# Patient Record
Sex: MALE | Race: WHITE | NOT HISPANIC OR LATINO | URBAN - METROPOLITAN AREA
[De-identification: names, ages, dates, MRNs, and addresses within clinical notes are randomized per-mention and may not be internally consistent; named-entity substitution may affect disease eponyms.]

---

## 2018-10-19 ENCOUNTER — EMERGENCY (EMERGENCY)
Facility: HOSPITAL | Age: 73
LOS: 0 days | Discharge: HOME | End: 2018-10-19
Attending: EMERGENCY MEDICINE | Admitting: EMERGENCY MEDICINE

## 2018-10-19 VITALS
SYSTOLIC BLOOD PRESSURE: 145 MMHG | RESPIRATION RATE: 18 BRPM | OXYGEN SATURATION: 97 % | HEART RATE: 74 BPM | DIASTOLIC BLOOD PRESSURE: 77 MMHG | TEMPERATURE: 97 F

## 2018-10-19 DIAGNOSIS — R31.9 HEMATURIA, UNSPECIFIED: ICD-10-CM

## 2018-10-19 DIAGNOSIS — E11.9 TYPE 2 DIABETES MELLITUS WITHOUT COMPLICATIONS: ICD-10-CM

## 2018-10-19 DIAGNOSIS — I10 ESSENTIAL (PRIMARY) HYPERTENSION: ICD-10-CM

## 2018-10-19 DIAGNOSIS — R33.9 RETENTION OF URINE, UNSPECIFIED: ICD-10-CM

## 2018-10-19 LAB
APPEARANCE UR: ABNORMAL
BACTERIA # UR AUTO: ABNORMAL /HPF
BILIRUB UR-MCNC: NEGATIVE — SIGNIFICANT CHANGE UP
COLOR SPEC: SIGNIFICANT CHANGE UP
DIFF PNL FLD: ABNORMAL
GLUCOSE UR QL: NEGATIVE MG/DL — SIGNIFICANT CHANGE UP
KETONES UR-MCNC: NEGATIVE — SIGNIFICANT CHANGE UP
LEUKOCYTE ESTERASE UR-ACNC: ABNORMAL
NITRITE UR-MCNC: NEGATIVE — SIGNIFICANT CHANGE UP
PH UR: 6 — SIGNIFICANT CHANGE UP (ref 5–8)
PROT UR-MCNC: 100 MG/DL
RBC CASTS # UR COMP ASSIST: >50 /HPF
SP GR SPEC: 1.02 — SIGNIFICANT CHANGE UP (ref 1.01–1.03)
UROBILINOGEN FLD QL: 0.2 MG/DL — SIGNIFICANT CHANGE UP (ref 0.2–0.2)
WBC UR QL: ABNORMAL /HPF

## 2018-10-19 RX ORDER — CEPHALEXIN 500 MG
500 CAPSULE ORAL ONCE
Qty: 0 | Refills: 0 | Status: DISCONTINUED | OUTPATIENT
Start: 2018-10-19 | End: 2018-10-19

## 2018-10-19 RX ORDER — CEPHALEXIN 500 MG
1 CAPSULE ORAL
Qty: 14 | Refills: 0
Start: 2018-10-19 | End: 2018-10-25

## 2018-10-19 RX ORDER — CEPHALEXIN 500 MG
1 CAPSULE ORAL
Qty: 14 | Refills: 0 | OUTPATIENT
Start: 2018-10-19 | End: 2018-10-25

## 2018-10-19 NOTE — ED PROVIDER NOTE - CARE PLAN
Principal Discharge DX:	Acute urinary retention  Assessment and plan of treatment:	Follow up with your PMD within 48-72 hours.  Take Keflex 1 tab by mouth every 12 hours. Worsening pain, new fever, chills, nausea, vomiting return to ER

## 2018-10-19 NOTE — ED ADULT NURSE NOTE - OBJECTIVE STATEMENT
pt presents with history of prostate CA with radiation, recent hematuria. pt states he has been unable to urinate since last night. denies fever.

## 2018-10-19 NOTE — ED PROVIDER NOTE - PLAN OF CARE
Follow up with your PMD within 48-72 hours.  Take Keflex 1 tab by mouth every 12 hours. Worsening pain, new fever, chills, nausea, vomiting return to ER

## 2018-10-19 NOTE — ED ADULT TRIAGE NOTE - CHIEF COMPLAINT QUOTE
"I have been having bloody urine for many days, then last night I couldn't pee at all." (+) suprapubic pain

## 2018-10-19 NOTE — ED PROVIDER NOTE - NSFOLLOWUPINSTRUCTIONS_ED_ALL_ED_FT
Follow up with your Urologist within 48-72 hours to make a sooner appointment.  Take Keflex 1 tab by mouth every 12 hours. Worsening pain, new fever, chills, nausea, vomiting return to ER

## 2018-10-19 NOTE — ED PROVIDER NOTE - OBJECTIVE STATEMENT
74yo M hx of prostate ca sp surgery remote, sp ?stricture dilation 1 year ago presenting with intermittent hematuria with small clots c/o inability to pass urine since last night. Pt states happened last week and he dislodged the clot with fishing line. No fevers or no chills. has baseline incontinence. No diarrhea or constipation. on asa, plavix. no a/c

## 2018-10-19 NOTE — ED PROVIDER NOTE - MEDICAL DECISION MAKING DETAILS
72yo M with prior hx of prostate ca and dilation of urethral stricture? presenting with retention. Voided twice in ED. afebrile. No pain. UA demonstrates blood. Post void residual 150cc on bedside ultrasound. Discussed discharge with abx for ? uti (symtoms) vs conservative management. Will give abx. Pt has uro follow up early in Nov. instructed to contact urologist and expedite appointment. return precautions given for inability to void, pain, fever or any other concern. Feels and appears well. No complaints at present. Eager to leave. Stable for discharge. Patient was given strict return and follow up precautions. The patient has been informed of all concerning signs and symptoms to return to Emergency Department, the necessity to follow up with PMD/Clinic/follow up provided within 2-3 days was explained, and the patient reports understanding of above with capacity and insight.

## 2018-10-19 NOTE — ED PROVIDER NOTE - PHYSICAL EXAMINATION
VITAL SIGNS: noted  CONSTITUTIONAL: Well-developed; well-nourished; in no acute distress  HEAD: Normocephalic; atraumatic  EYES: PERRL, EOM intact; conjunctiva and sclera clear  ENT: No nasal discharge; airway clear. MMM  NECK: Supple; non tender. No anterior cervical lymphadenopathy noted  CARD: S1, S2 normal; no murmurs, gallops, or rubs. Regular rate and rhythm  RESP: CTAB/L, no wheezes, rales or rhonchi  ABD: Normal bowel sounds; soft; non-distended; non-tender; no hepatosplenomegaly. No CVA tenderness  EXT: Normal ROM. No calf tenderness or edema. Distal pulses intact  NEURO: Alert, oriented. Grossly unremarkable. No focal deficits  SKIN: Skin exam is warm and dry, no acute rash  MS: No midline spinal tenderness

## 2018-11-06 PROBLEM — C61 MALIGNANT NEOPLASM OF PROSTATE: Chronic | Status: ACTIVE | Noted: 2018-10-19

## 2018-11-06 PROBLEM — I10 ESSENTIAL (PRIMARY) HYPERTENSION: Chronic | Status: ACTIVE | Noted: 2018-10-19

## 2018-11-06 PROBLEM — E11.9 TYPE 2 DIABETES MELLITUS WITHOUT COMPLICATIONS: Chronic | Status: ACTIVE | Noted: 2018-10-19

## 2018-11-08 ENCOUNTER — OUTPATIENT (OUTPATIENT)
Dept: OUTPATIENT SERVICES | Facility: HOSPITAL | Age: 73
LOS: 1 days | Discharge: HOME | End: 2018-11-08

## 2018-11-08 DIAGNOSIS — R07.9 CHEST PAIN, UNSPECIFIED: ICD-10-CM

## 2018-11-21 ENCOUNTER — EMERGENCY (EMERGENCY)
Facility: HOSPITAL | Age: 73
LOS: 0 days | Discharge: HOME | End: 2018-11-21
Attending: EMERGENCY MEDICINE | Admitting: EMERGENCY MEDICINE

## 2018-11-21 VITALS
OXYGEN SATURATION: 93 % | DIASTOLIC BLOOD PRESSURE: 67 MMHG | TEMPERATURE: 96 F | SYSTOLIC BLOOD PRESSURE: 147 MMHG | RESPIRATION RATE: 20 BRPM | HEART RATE: 92 BPM

## 2018-11-21 DIAGNOSIS — Z79.2 LONG TERM (CURRENT) USE OF ANTIBIOTICS: ICD-10-CM

## 2018-11-21 DIAGNOSIS — Z79.899 OTHER LONG TERM (CURRENT) DRUG THERAPY: ICD-10-CM

## 2018-11-21 DIAGNOSIS — I10 ESSENTIAL (PRIMARY) HYPERTENSION: ICD-10-CM

## 2018-11-21 DIAGNOSIS — E11.9 TYPE 2 DIABETES MELLITUS WITHOUT COMPLICATIONS: ICD-10-CM

## 2018-11-21 DIAGNOSIS — N40.0 BENIGN PROSTATIC HYPERPLASIA WITHOUT LOWER URINARY TRACT SYMPTOMS: ICD-10-CM

## 2018-11-21 DIAGNOSIS — R33.9 RETENTION OF URINE, UNSPECIFIED: ICD-10-CM

## 2018-11-21 DIAGNOSIS — Z79.02 LONG TERM (CURRENT) USE OF ANTITHROMBOTICS/ANTIPLATELETS: ICD-10-CM

## 2018-11-21 DIAGNOSIS — Z79.82 LONG TERM (CURRENT) USE OF ASPIRIN: ICD-10-CM

## 2018-11-21 DIAGNOSIS — R30.0 DYSURIA: ICD-10-CM

## 2018-11-21 LAB
ALBUMIN SERPL ELPH-MCNC: 4.7 G/DL — SIGNIFICANT CHANGE UP (ref 3.5–5.2)
ALP SERPL-CCNC: 66 U/L — SIGNIFICANT CHANGE UP (ref 30–115)
ALT FLD-CCNC: 16 U/L — SIGNIFICANT CHANGE UP (ref 0–41)
ANION GAP SERPL CALC-SCNC: 19 MMOL/L — HIGH (ref 7–14)
APPEARANCE UR: ABNORMAL
APTT BLD: 26.5 SEC — LOW (ref 27–39.2)
AST SERPL-CCNC: 20 U/L — SIGNIFICANT CHANGE UP (ref 0–41)
BACTERIA # UR AUTO: ABNORMAL /HPF
BASOPHILS # BLD AUTO: 0.04 K/UL — SIGNIFICANT CHANGE UP (ref 0–0.2)
BASOPHILS NFR BLD AUTO: 0.5 % — SIGNIFICANT CHANGE UP (ref 0–1)
BILIRUB SERPL-MCNC: 0.5 MG/DL — SIGNIFICANT CHANGE UP (ref 0.2–1.2)
BILIRUB UR-MCNC: NEGATIVE — SIGNIFICANT CHANGE UP
BLD GP AB SCN SERPL QL: SIGNIFICANT CHANGE UP
BUN SERPL-MCNC: 15 MG/DL — SIGNIFICANT CHANGE UP (ref 10–20)
CALCIUM SERPL-MCNC: 9.8 MG/DL — SIGNIFICANT CHANGE UP (ref 8.5–10.1)
CHLORIDE SERPL-SCNC: 96 MMOL/L — LOW (ref 98–110)
CO2 SERPL-SCNC: 20 MMOL/L — SIGNIFICANT CHANGE UP (ref 17–32)
COLOR SPEC: YELLOW — SIGNIFICANT CHANGE UP
CREAT SERPL-MCNC: 1.3 MG/DL — SIGNIFICANT CHANGE UP (ref 0.7–1.5)
DIFF PNL FLD: ABNORMAL
EOSINOPHIL # BLD AUTO: 0.09 K/UL — SIGNIFICANT CHANGE UP (ref 0–0.7)
EOSINOPHIL NFR BLD AUTO: 1.1 % — SIGNIFICANT CHANGE UP (ref 0–8)
GLUCOSE SERPL-MCNC: 190 MG/DL — HIGH (ref 70–99)
GLUCOSE UR QL: NEGATIVE MG/DL — SIGNIFICANT CHANGE UP
HCT VFR BLD CALC: 31.5 % — LOW (ref 42–52)
HGB BLD-MCNC: 10.7 G/DL — LOW (ref 14–18)
IMM GRANULOCYTES NFR BLD AUTO: 0.5 % — HIGH (ref 0.1–0.3)
INR BLD: 0.92 RATIO — SIGNIFICANT CHANGE UP (ref 0.65–1.3)
KETONES UR-MCNC: NEGATIVE — SIGNIFICANT CHANGE UP
LEUKOCYTE ESTERASE UR-ACNC: ABNORMAL
LYMPHOCYTES # BLD AUTO: 1.52 K/UL — SIGNIFICANT CHANGE UP (ref 1.2–3.4)
LYMPHOCYTES # BLD AUTO: 17.8 % — LOW (ref 20.5–51.1)
MCHC RBC-ENTMCNC: 32.1 PG — HIGH (ref 27–31)
MCHC RBC-ENTMCNC: 34 G/DL — SIGNIFICANT CHANGE UP (ref 32–37)
MCV RBC AUTO: 94.6 FL — HIGH (ref 80–94)
MONOCYTES # BLD AUTO: 0.65 K/UL — HIGH (ref 0.1–0.6)
MONOCYTES NFR BLD AUTO: 7.6 % — SIGNIFICANT CHANGE UP (ref 1.7–9.3)
NEUTROPHILS # BLD AUTO: 6.18 K/UL — SIGNIFICANT CHANGE UP (ref 1.4–6.5)
NEUTROPHILS NFR BLD AUTO: 72.5 % — SIGNIFICANT CHANGE UP (ref 42.2–75.2)
NITRITE UR-MCNC: NEGATIVE — SIGNIFICANT CHANGE UP
NRBC # BLD: 0 /100 WBCS — SIGNIFICANT CHANGE UP (ref 0–0)
PH UR: 6 — SIGNIFICANT CHANGE UP (ref 5–8)
PLATELET # BLD AUTO: 202 K/UL — SIGNIFICANT CHANGE UP (ref 130–400)
POTASSIUM SERPL-MCNC: 4.1 MMOL/L — SIGNIFICANT CHANGE UP (ref 3.5–5)
POTASSIUM SERPL-SCNC: 4.1 MMOL/L — SIGNIFICANT CHANGE UP (ref 3.5–5)
PROT SERPL-MCNC: 7.8 G/DL — SIGNIFICANT CHANGE UP (ref 6–8)
PROT UR-MCNC: ABNORMAL MG/DL
PROTHROM AB SERPL-ACNC: 10.6 SEC — SIGNIFICANT CHANGE UP (ref 9.95–12.87)
RBC # BLD: 3.33 M/UL — LOW (ref 4.7–6.1)
RBC # FLD: 13.9 % — SIGNIFICANT CHANGE UP (ref 11.5–14.5)
RBC CASTS # UR COMP ASSIST: >50 /HPF
SODIUM SERPL-SCNC: 135 MMOL/L — SIGNIFICANT CHANGE UP (ref 135–146)
SP GR SPEC: 1.01 — SIGNIFICANT CHANGE UP (ref 1.01–1.03)
TYPE + AB SCN PNL BLD: SIGNIFICANT CHANGE UP
UROBILINOGEN FLD QL: 0.2 MG/DL — SIGNIFICANT CHANGE UP (ref 0.2–0.2)
WBC # BLD: 8.52 K/UL — SIGNIFICANT CHANGE UP (ref 4.8–10.8)
WBC # FLD AUTO: 8.52 K/UL — SIGNIFICANT CHANGE UP (ref 4.8–10.8)
WBC UR QL: ABNORMAL /HPF

## 2018-11-21 NOTE — ED PROVIDER NOTE - PHYSICAL EXAMINATION
Afebrile, hemodynamically stable, saturating well  NAD, well appearing  Head NCAT  EOMI grossly, anicteric  MM dry  RRR, nml S1/S2, no m/r/g  Lungs CTAB, no w/r/r  Abd soft, suprapubic TTP  No CVAT  AAO, CN's 3-12 grossly intact  LOPEZ spontaneously, no leg cyanosis or edema  Skin warm, dry, no rashes or hives

## 2018-11-21 NOTE — ED PROVIDER NOTE - OBJECTIVE STATEMENT
Declines , uses himself and daughter. 73yoM with h/o BPH, prostate ca s/p radiation, DM, HTN, anemia, s/p TURP, s/p cystoscopy yesterday with Dr. Angel, presents with dysuria, a few blood clots, and suprapubic fullness. Sent in by PMD for urology to place Talamantes. Pt denies fever, vomiting, other abdominal pain, or any other symptoms.

## 2018-11-21 NOTE — CONSULT NOTE ADULT - ASSESSMENT
74 yo male presents with inability to void x 1 day, Talamantes placed 1000 cc pvr    no uti or laurence noted    plan  -d/c home with Talamantes  -continue alfuzosin  -take only ONE anticholinergic, either Toviaz or Oxybutynin  -f/u with Dr. Angel next week for Talamantes removal    case d/w Dr. Soto and Stanley

## 2018-11-21 NOTE — ED ADULT NURSE NOTE - NSIMPLEMENTINTERV_GEN_ALL_ED
Implemented All Universal Safety Interventions:  [place] to call system. Call bell, personal items and telephone within reach. Instruct patient to call for assistance. Room bathroom lighting operational. Non-slip footwear when patient is off stretcher. Physically safe environment: no spills, clutter or unnecessary equipment. Stretcher in lowest position, wheels locked, appropriate side rails in place.

## 2018-11-21 NOTE — ED ADULT TRIAGE NOTE - CHIEF COMPLAINT QUOTE
Pt sent in for urinary retention Pt sent in for urinary retention, pt needs urology for catheter placement due to multiple prostate issues as per pt urologist

## 2018-11-21 NOTE — ED ADULT NURSE NOTE - OBJECTIVE STATEMENT
Pt is with a history of prostate ca is complaining of pain/hematuria on urination. Pt states he feels full with increased pressure starting today

## 2018-11-21 NOTE — CONSULT NOTE ADULT - SUBJECTIVE AND OBJECTIVE BOX
74 yo male with hx of caP s/p XRT 3 years ago, pt presents to ED unable to void since this morning. Pt has urologist in [place] and also saw Dr. Angel yesterday, he underwent a cystoscopy, pt was noted to have post radiation changes. Pt is currently taking Toviaz and oxybutynin for bladder spasms. Pt's daughter at bedside, pt denies n/v/f/c    PAST MEDICAL & SURGICAL HISTORY:  Anemia  PVD (peripheral vascular disease)  Diabetes mellitus: type 2  Hypertension  Prostate CA: with radiation    Home Medications:  alfuzosin 10 mg oral tablet, extended release: 1 tab(s) orally once a day (2018 19:36)  amLODIPine 10 mg oral tablet:  (2018 19:36)  Aspirin Low Dose 81 mg oral delayed release tablet: 1 tab(s) orally once a day (2018 19:36)  atorvastatin 10 mg oral tablet: 1 tab(s) orally once a day (2018 19:36)  glipiZIDE 5 mg oral tablet: 1 tab(s) orally once a day (2018 19:36)  isosorbide mononitrate 30 mg oral tablet, extended release: 1 tab(s) orally once a day (in the morning) (2018 19:36)  Janumet 50 mg-500 mg oral tablet:  (2018 19:36)  lisinopril-hydroCHLOROthiazide 20 mg-25 mg oral tablet: 1 tab(s) orally once a day (2018 19:36)  Plavix 75 mg oral tablet: 1 tab(s) orally once a day (2018 19:36)  Ranexa 500 mg oral tablet, extended release: 1 tab(s) orally 2 times a day (2018 19:36)  rOPINIRole 0.25 mg oral tablet:  (2018 19:36)  Toviaz:  (2018 19:36)    Allergies    No Known Allergies    Intolerances    SHx - NC    FHx - NC    Vital Signs Last 24 Hrs  T(C): 35.8 (2018 18:33), Max: 35.8 (2018 18:33)  T(F): 96.5 (2018 18:33), Max: 96.5 (2018 18:33)  HR: 92 (2018 18:33) (92 - 92)  BP: 147/67 (2018 18:33) (147/67 - 147/67  RR: 20 (2018 18:33) (20 - 20)  SpO2: 93% (2018 18:33) (93% - 93%)    pt seen and examined at bedside  a+ox3, discomfort, non toxic  abd - soft, nd, nttp, +palpable bladder  gu- genitalia wnl, under sterile technique a 14 fr coude catheter was placed w/o issue, 1000cc yellow urine evacuated, pt noted relief of pain                          10.7   8.52  )-----------( 202      ( 2018 20:00 )             31.5   11-21    135  |  96<L>  |  15  ----------------------------<  190<H>  4.1   |  20  |  1.3    Ca    9.8      2018 20:00    TPro  7.8  /  Alb  4.7  /  TBili  0.5  /  DBili  x   /  AST  20  /  ALT  16  /  AlkPhos  66  11-21    Urinalysis Basic - ( 2018 20:40 )    Color: Yellow / Appearance: Cloudy / S.015 / pH: x  Gluc: x / Ketone: Negative  / Bili: Negative / Urobili: 0.2 mg/dL   Blood: x / Protein: Trace mg/dL / Nitrite: Negative   Leuk Esterase: Small / RBC: x / WBC x   Sq Epi: x / Non Sq Epi: x / Bacteria: x

## 2018-11-21 NOTE — ED ADULT NURSE NOTE - CHIEF COMPLAINT QUOTE
Pt sent in for urinary retention, pt needs urology for catheter placement due to multiple prostate issues as per pt urologist

## 2018-11-21 NOTE — ED PROVIDER NOTE - MEDICAL DECISION MAKING DETAILS
Acute urinary retention. No signs of cord compression, pt with strong prostate hx and cystoscopy yesterday. Urology placed Talamantes without difficulty with 1L residual, with no NIDHI or UTI shown, state OK with discharge and outpt f/u, also to stop one of the anticholinergics he is taking. Following placement, pt now well appearing, hemodynamically stable. Discharged with need for uro f/u and to stop one of the anticholinergics and return precautions.

## 2018-11-21 NOTE — ED ADULT NURSE NOTE - PMH
Anemia    Diabetes mellitus  type 2  Hypertension    Prostate CA  with radiation  PVD (peripheral vascular disease)

## 2018-11-22 LAB
CULTURE RESULTS: NO GROWTH — SIGNIFICANT CHANGE UP
SPECIMEN SOURCE: SIGNIFICANT CHANGE UP

## 2018-11-24 ENCOUNTER — EMERGENCY (EMERGENCY)
Facility: HOSPITAL | Age: 73
LOS: 0 days | Discharge: HOME | End: 2018-11-24
Attending: EMERGENCY MEDICINE | Admitting: EMERGENCY MEDICINE

## 2018-11-24 VITALS
DIASTOLIC BLOOD PRESSURE: 69 MMHG | TEMPERATURE: 98 F | SYSTOLIC BLOOD PRESSURE: 149 MMHG | HEIGHT: 70 IN | WEIGHT: 205.03 LBS | HEART RATE: 90 BPM | RESPIRATION RATE: 18 BRPM | OXYGEN SATURATION: 95 %

## 2018-11-24 VITALS
SYSTOLIC BLOOD PRESSURE: 134 MMHG | HEART RATE: 78 BPM | DIASTOLIC BLOOD PRESSURE: 67 MMHG | OXYGEN SATURATION: 96 % | TEMPERATURE: 98 F | RESPIRATION RATE: 18 BRPM

## 2018-11-24 DIAGNOSIS — I10 ESSENTIAL (PRIMARY) HYPERTENSION: ICD-10-CM

## 2018-11-24 DIAGNOSIS — N40.0 BENIGN PROSTATIC HYPERPLASIA WITHOUT LOWER URINARY TRACT SYMPTOMS: ICD-10-CM

## 2018-11-24 DIAGNOSIS — Z79.899 OTHER LONG TERM (CURRENT) DRUG THERAPY: ICD-10-CM
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
## 2018-11-24 DIAGNOSIS — T83.031A LEAKAGE OF INDWELLING URETHRAL CATHETER, INITIAL ENCOUNTER: ICD-10-CM
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
## 2018-11-24 DIAGNOSIS — Z79.2 LONG TERM (CURRENT) USE OF ANTIBIOTICS: ICD-10-CM
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
## 2018-11-24 DIAGNOSIS — Z79.82 LONG TERM (CURRENT) USE OF ASPIRIN: ICD-10-CM

## 2018-11-24 DIAGNOSIS — E11.9 TYPE 2 DIABETES MELLITUS WITHOUT COMPLICATIONS: ICD-10-CM

## 2018-11-24 DIAGNOSIS — Z79.02 LONG TERM (CURRENT) USE OF ANTITHROMBOTICS/ANTIPLATELETS: ICD-10-CM

## 2018-11-24 PROBLEM — D64.9 ANEMIA, UNSPECIFIED: Chronic | Status: ACTIVE | Noted: 2018-11-21

## 2018-11-24 PROBLEM — I73.9 PERIPHERAL VASCULAR DISEASE, UNSPECIFIED: Chronic | Status: ACTIVE | Noted: 2018-11-21

## 2018-11-24 LAB
APPEARANCE UR: CLEAR — SIGNIFICANT CHANGE UP
BACTERIA # UR AUTO: ABNORMAL /HPF
BILIRUB UR-MCNC: NEGATIVE — SIGNIFICANT CHANGE UP
COLOR SPEC: YELLOW — SIGNIFICANT CHANGE UP
DIFF PNL FLD: ABNORMAL
EPI CELLS # UR: ABNORMAL /HPF
GLUCOSE UR QL: NEGATIVE MG/DL — SIGNIFICANT CHANGE UP
KETONES UR-MCNC: NEGATIVE — SIGNIFICANT CHANGE UP
LEUKOCYTE ESTERASE UR-ACNC: ABNORMAL
NITRITE UR-MCNC: NEGATIVE — SIGNIFICANT CHANGE UP
PH UR: 6 — SIGNIFICANT CHANGE UP (ref 5–8)
PROT UR-MCNC: NEGATIVE MG/DL — SIGNIFICANT CHANGE UP
RBC CASTS # UR COMP ASSIST: ABNORMAL /HPF
SP GR SPEC: <=1.005 — SIGNIFICANT CHANGE UP (ref 1.01–1.03)
UROBILINOGEN FLD QL: 0.2 MG/DL — SIGNIFICANT CHANGE UP (ref 0.2–0.2)
WBC UR QL: ABNORMAL /HPF

## 2018-11-24 RX ORDER — CIPROFLOXACIN LACTATE 400MG/40ML
1 VIAL (ML) INTRAVENOUS
Qty: 5 | Refills: 0
Start: 2018-11-24 | End: 2018-11-28

## 2018-11-24 NOTE — ED ADULT NURSE NOTE - NSIMPLEMENTINTERV_GEN_ALL_ED
Implemented All Universal Safety Interventions:  [place] to call system. Call bell, personal items and telephone within reach. Instruct patient to call for assistance. Room bathroom lighting operational. Non-slip footwear when patient is off stretcher. Physically safe environment: no spills, clutter or unnecessary equipment. Stretcher in lowest position, wheels locked, appropriate side rails in place.

## 2018-11-24 NOTE — ED PROVIDER NOTE - MEDICAL DECISION MAKING DETAILS
pt feeling better, palomo working properly, aware of proper palomo care, strict return precautions given, will follow up.

## 2018-11-24 NOTE — ED PROVIDER NOTE - OBJECTIVE STATEMENT
73 y.o M w/ PMHx prostate CA s/p TURP, DM, HTN p/w urinating from around his palomo. His palomo bag has not collected urine since last night. No abd pain, no distention, no fever, no issues with bowel, no blood in urine. Pt's urologist is Dr. Te Angel.

## 2018-11-24 NOTE — ED ADULT NURSE REASSESSMENT NOTE - NS ED NURSE REASSESS COMMENT FT1
pt presents with palomo catheter complaints. Pt reports he does not want to speak to this RN as he was already seen by a doctor and his problem was fixed, pt states "save your time". Pt A&Ox3, no distress noted, resting comfortably.

## 2018-11-24 NOTE — ED PROVIDER NOTE - CARE PROVIDER_API CALL
[place]), Urology  [facility] [place] [address]  Phone: [phone]  Fax: [phone]

## 2018-11-24 NOTE — ED PROVIDER NOTE - NS ED ROS FT
Constitutional:  No fever, chills, lethargy, or abnormal weight loss  Eyes:  No eye pain or visual changes  ENMT: No nasal discharge, no toothache, no sore throat. No neck pain or stiffness  Cardiac:  No chest pain or palpitations  Respiratory:  No cough or respiratory distress.   GI:  No nausea, vomiting, diarrhea or abdominal pain.  :  No dysuria, frequency or burning. Palomo not draining urine. Urine coming out around the palomo.  MS:  No back or joint pain.  Neuro:  No headache. No numbness, weakness, or tingling.   Skin:  No skin rash  Except as documented in the HPI,  all other systems are negative

## 2018-11-24 NOTE — ED PROVIDER NOTE - PROGRESS NOTE DETAILS
Attending Note: I personally evaluated the patient. I reviewed the Resident’s note (as assigned above), and agree with the findings and plan except as documented in my note.   72 y/o M PMHx BPH, prostate CA s/p radiation, HTN, diabetes, anemia, s/p TURP, s/p cystoscopy on 11/20/18 with Dr. Angel presents with leakage around Talamantes that started today. Pt reports he was seen here on 11/21/18 for urinary retention with hematuria. At that time pt was seen by urology, had Talamantes placed, and continued his medications of Alfuzosin.   No fever, chills, n/v, CP,  pleuritic CP, SOB, palpitations, diaphoresis, cough, ha/lh/dizziness, numbness/tingling, neck pain/ stiffness, abdominal pain, diarrhea, constipation, melena/BRBPR, trauma, weakness, edema, calf pain/swelling/erythema, sick contacts, recent travel or rash.PE: Vital Signs: I have reviewed the initial vital signs. Constitutional: WDWN in NAD. Sitting on stretcher in NAD. Integumentary: No rash. ENT: MMM NECK: Supple, non-tender, no meningeal signs. Cardiovascular: RRR, radial pulses 2/4 b/l. No JVD. Respiratory: BS present b/l, ctabl, no wheezing or crackles, good resp effort and excursion, good air exchange,  no accessory muscle use, no stridor. Gastrointestinal: BS present throughout all 4 quadrants, soft, ND, NT, no rebound tenderness or guarding, no CVAT. : leakage around Talamantes, no blood.  Musculoskeletal: FROM, no edema, no calf pain/swelling/erythema. Neurologic: AAOx3, motor 5/5 and sensation intact throughout upper and lower ext, CN II-XII intact, No facial droop or slurring of speech. No focal deficits. Plan: Will flush Talamantes, UA, and reassess. Pt recently had blood work done on 11/21/18 with normal BUN and creatinine. On 11/21/18 pt had labs including CBC, coags, CMP, and UA. BUN and creatinine on 11/21/18 was 15/1.3, urine culture showed no growth. Talamantes was flushed, urine bag now filling with urine with no leakage around Talamantes. Bladder scan prior to this showed 475 cc of urine. Will reassess. Attending Note: I personally evaluated the patient. I reviewed the Resident’s note (as assigned above), and agree with the findings and plan except as documented in my note.   72 y/o M PMHx BPH, prostate CA s/p radiation, HTN, diabetes, anemia, s/p TURP, s/p cystoscopy on 11/20/18 with Dr. Angel presents with leakage around Talamantes that started today. Pt reports he was seen here on 11/21/18 for urinary retention with hematuria. At that time pt was seen by urology, had Talamantes placed, and continued his medications of Alfuzosin.   No fever, chills, n/v, CP,  pleuritic CP, SOB, palpitations, diaphoresis, cough, ha/lh/dizziness, numbness/tingling, neck pain/ stiffness,  diarrhea, constipation, melena/BRBPR, trauma, weakness, edema, calf pain/swelling/erythema, sick contacts, recent travel or rash.PE: Vital Signs: I have reviewed the initial vital signs. Constitutional: WDWN male sitting on stretcher in NAD. Integumentary: No rash. ENT: MMM NECK: Supple, non-tender, no meningeal signs. Cardiovascular: RRR, radial pulses 2/4 b/l. No JVD. Respiratory: BS present b/l, ctabl, no wheezing or crackles, good resp effort and excursion, good air exchange,  no accessory muscle use, no stridor. Gastrointestinal: BS present throughout all 4 quadrants, soft, ND, NT, no rebound tenderness or guarding, no CVAT. : leakage around Talamantes, no blood.  Musculoskeletal: FROM, no edema, no calf pain/swelling/erythema. Neurologic: AAOx3, motor 5/5 and sensation intact throughout upper and lower ext, CN II-XII intact, No facial droop or slurring of speech. No focal deficits. Plan: Will flush Talamantes, UA, and reassess. Pt recently had blood work done on 11/21/18 with normal BUN and creatinine. u.a similar to u.a. on oct 19 which was poistive for Enterococcus faecilis, sensitive to Levaquin , pt aware of abx sent to pharmacy, ambualtory in ed, palomo working properly, would like to go home, eager, reports no symptoms. has appt with urology, will follow up, aware of signs and symptoms to return for.

## 2018-11-24 NOTE — ED PROVIDER NOTE - CONDUCTED A DETAILED DISCUSSION WITH PATIENT AND/OR GUARDIAN REGARDING, MDM
need for outpatient follow-up/lab results/return to ED if symptoms worsen, persist or questions arise return to ED if symptoms worsen, persist or questions arise/need for outpatient follow-up/tPa discussion/lab results

## 2018-11-24 NOTE — ED PROVIDER NOTE - PHYSICAL EXAMINATION
CONSTITUTIONAL: well-appearing, in NAD  SKIN: Warm dry, normal skin turgor  HEAD: NCAT  EYES: EOMI, PERRLA, no scleral icterus  ENT: no sinus tenderness to percussion, normal dental exam, normal pharynx with no erythema or exudates  NECK: Supple; non tender. Full ROM.   CARD: RRR, no murmurs.  RESP: clear to ausculation b/l.  No rales, rhonchi, or wheezing.  ABD: soft, + BS, non-tender, non-distended, no rebound or guarding. No CVA tenderness, Empty palomo bag, Urine leaking from around palomo insertion. Chaperoned by student Daniel.  EXT: Full ROM, no bony tenderness, no pedal edema, no calf tenderness  NEURO: normal motor. normal sensory. Normal gait.  PSYCH: Cooperative, appropriate.

## 2018-11-25 LAB
CULTURE RESULTS: NO GROWTH — SIGNIFICANT CHANGE UP
SPECIMEN SOURCE: SIGNIFICANT CHANGE UP

## 2019-04-24 PROBLEM — Z00.00 ENCOUNTER FOR PREVENTIVE HEALTH EXAMINATION: Status: ACTIVE | Noted: 2019-04-24

## 2019-05-24 ENCOUNTER — EMERGENCY (EMERGENCY)
Facility: HOSPITAL | Age: 74
LOS: 0 days | Discharge: HOME | End: 2019-05-24
Attending: EMERGENCY MEDICINE | Admitting: EMERGENCY MEDICINE
Payer: MEDICARE

## 2019-05-24 VITALS
SYSTOLIC BLOOD PRESSURE: 128 MMHG | OXYGEN SATURATION: 98 % | TEMPERATURE: 98 F | RESPIRATION RATE: 18 BRPM | DIASTOLIC BLOOD PRESSURE: 65 MMHG | HEART RATE: 93 BPM

## 2019-05-24 VITALS — WEIGHT: 201.94 LBS

## 2019-05-24 DIAGNOSIS — Z79.899 OTHER LONG TERM (CURRENT) DRUG THERAPY: ICD-10-CM

## 2019-05-24 DIAGNOSIS — N39.0 URINARY TRACT INFECTION, SITE NOT SPECIFIED: ICD-10-CM

## 2019-05-24 DIAGNOSIS — I10 ESSENTIAL (PRIMARY) HYPERTENSION: ICD-10-CM

## 2019-05-24 DIAGNOSIS — R33.9 RETENTION OF URINE, UNSPECIFIED: ICD-10-CM

## 2019-05-24 DIAGNOSIS — E11.9 TYPE 2 DIABETES MELLITUS WITHOUT COMPLICATIONS: ICD-10-CM

## 2019-05-24 DIAGNOSIS — Z79.84 LONG TERM (CURRENT) USE OF ORAL HYPOGLYCEMIC DRUGS: ICD-10-CM

## 2019-05-24 LAB
ALBUMIN SERPL ELPH-MCNC: 4.2 G/DL — SIGNIFICANT CHANGE UP (ref 3.5–5.2)
ALP SERPL-CCNC: 85 U/L — SIGNIFICANT CHANGE UP (ref 30–115)
ALT FLD-CCNC: 11 U/L — SIGNIFICANT CHANGE UP (ref 0–41)
ANION GAP SERPL CALC-SCNC: 14 MMOL/L — SIGNIFICANT CHANGE UP (ref 7–14)
APPEARANCE UR: ABNORMAL
AST SERPL-CCNC: 12 U/L — SIGNIFICANT CHANGE UP (ref 0–41)
BACTERIA # UR AUTO: ABNORMAL /HPF
BASOPHILS # BLD AUTO: 0.05 K/UL — SIGNIFICANT CHANGE UP (ref 0–0.2)
BASOPHILS NFR BLD AUTO: 0.7 % — SIGNIFICANT CHANGE UP (ref 0–1)
BILIRUB SERPL-MCNC: 0.6 MG/DL — SIGNIFICANT CHANGE UP (ref 0.2–1.2)
BILIRUB UR-MCNC: NEGATIVE — SIGNIFICANT CHANGE UP
BUN SERPL-MCNC: 23 MG/DL — HIGH (ref 10–20)
CALCIUM SERPL-MCNC: 10.2 MG/DL — HIGH (ref 8.5–10.1)
CHLORIDE SERPL-SCNC: 106 MMOL/L — SIGNIFICANT CHANGE UP (ref 98–110)
CO2 SERPL-SCNC: 23 MMOL/L — SIGNIFICANT CHANGE UP (ref 17–32)
COLOR SPEC: ABNORMAL
CREAT SERPL-MCNC: 1.4 MG/DL — SIGNIFICANT CHANGE UP (ref 0.7–1.5)
DIFF PNL FLD: ABNORMAL
EOSINOPHIL # BLD AUTO: 0.2 K/UL — SIGNIFICANT CHANGE UP (ref 0–0.7)
EOSINOPHIL NFR BLD AUTO: 2.7 % — SIGNIFICANT CHANGE UP (ref 0–8)
EPI CELLS # UR: ABNORMAL /HPF
GLUCOSE SERPL-MCNC: 83 MG/DL — SIGNIFICANT CHANGE UP (ref 70–99)
GLUCOSE UR QL: NEGATIVE MG/DL — SIGNIFICANT CHANGE UP
HCT VFR BLD CALC: 35 % — LOW (ref 42–52)
HGB BLD-MCNC: 11.9 G/DL — LOW (ref 14–18)
IMM GRANULOCYTES NFR BLD AUTO: 0.4 % — HIGH (ref 0.1–0.3)
KETONES UR-MCNC: NEGATIVE — SIGNIFICANT CHANGE UP
LEUKOCYTE ESTERASE UR-ACNC: ABNORMAL
LYMPHOCYTES # BLD AUTO: 3 K/UL — SIGNIFICANT CHANGE UP (ref 1.2–3.4)
LYMPHOCYTES # BLD AUTO: 40.7 % — SIGNIFICANT CHANGE UP (ref 20.5–51.1)
MCHC RBC-ENTMCNC: 32.3 PG — HIGH (ref 27–31)
MCHC RBC-ENTMCNC: 34 G/DL — SIGNIFICANT CHANGE UP (ref 32–37)
MCV RBC AUTO: 95.1 FL — HIGH (ref 80–94)
MONOCYTES # BLD AUTO: 0.72 K/UL — HIGH (ref 0.1–0.6)
MONOCYTES NFR BLD AUTO: 9.8 % — HIGH (ref 1.7–9.3)
NEUTROPHILS # BLD AUTO: 3.38 K/UL — SIGNIFICANT CHANGE UP (ref 1.4–6.5)
NEUTROPHILS NFR BLD AUTO: 45.7 % — SIGNIFICANT CHANGE UP (ref 42.2–75.2)
NITRITE UR-MCNC: POSITIVE
NRBC # BLD: 0 /100 WBCS — SIGNIFICANT CHANGE UP (ref 0–0)
PH UR: 6.5 — SIGNIFICANT CHANGE UP (ref 5–8)
PLATELET # BLD AUTO: 201 K/UL — SIGNIFICANT CHANGE UP (ref 130–400)
POTASSIUM SERPL-MCNC: 4.4 MMOL/L — SIGNIFICANT CHANGE UP (ref 3.5–5)
POTASSIUM SERPL-SCNC: 4.4 MMOL/L — SIGNIFICANT CHANGE UP (ref 3.5–5)
PROT SERPL-MCNC: 7.4 G/DL — SIGNIFICANT CHANGE UP (ref 6–8)
PROT UR-MCNC: 100 MG/DL
RBC # BLD: 3.68 M/UL — LOW (ref 4.7–6.1)
RBC # FLD: 13.2 % — SIGNIFICANT CHANGE UP (ref 11.5–14.5)
RBC CASTS # UR COMP ASSIST: ABNORMAL /HPF
SODIUM SERPL-SCNC: 143 MMOL/L — SIGNIFICANT CHANGE UP (ref 135–146)
SP GR SPEC: 1.01 — SIGNIFICANT CHANGE UP (ref 1.01–1.03)
UROBILINOGEN FLD QL: 0.2 MG/DL — SIGNIFICANT CHANGE UP (ref 0.2–0.2)
WBC # BLD: 7.38 K/UL — SIGNIFICANT CHANGE UP (ref 4.8–10.8)
WBC # FLD AUTO: 7.38 K/UL — SIGNIFICANT CHANGE UP (ref 4.8–10.8)
WBC UR QL: ABNORMAL /HPF

## 2019-05-24 PROCEDURE — 99284 EMERGENCY DEPT VISIT MOD MDM: CPT

## 2019-05-24 RX ORDER — CEFDINIR 250 MG/5ML
1 POWDER, FOR SUSPENSION ORAL
Qty: 20 | Refills: 0
Start: 2019-05-24 | End: 2019-06-02

## 2019-05-24 NOTE — ED PROVIDER NOTE - CLINICAL SUMMARY MEDICAL DECISION MAKING FREE TEXT BOX
Patient presented with urinary retention, bladder found to be distended on exam, but patient otherwise afebrile, HD stable, no systemic symptoms. Placed palomo in ED with >1L urinary output with relief of symptoms. Checked labs which showed normal WBC count, normal creatinine. UA showed (+) UTI. Ucx sent. Will leave palomo in place and have patient follow up with urology. Given [facility] urology at patient request in case he cannot get appointment with his [place] urologist. Will prescribe abx in the mean time. Patient agrees with plan. HD stable, ambulatory, tolerates PO at time of discharge.

## 2019-05-24 NOTE — ED PROVIDER NOTE - PROGRESS NOTE DETAILS
Patient seen and evaluated by me. Labs, UA, ucx ordered. Talamantes to be inserted since patient with evidence of urinary retention in ED. Will re-eval pending work up. Patient feels better after palomo placed, urine output >1L. UA shows UTI. Ucx sent. Will discharge home with urology follow up. Patient agrees with plan.

## 2019-05-24 NOTE — ED ADULT NURSE NOTE - OBJECTIVE STATEMENT
Pt c/o difficulty urinating, pt endorses having urinary retention and urinary frequency worsening over the past four days. Pt has pmh of prostate CA now in remission. Denies fevers, chills, burning sensation while urinating, chest pain, shortness of breath, back/flank pain, abd pain, nausea, vomiting, diarrhea.

## 2019-05-24 NOTE — ED PROVIDER NOTE - NSFOLLOWUPCLINICS_GEN_ALL_ED_FT
[facility]  Urology  .  [place]   Phone: [phone]  Fax:   Follow Up Time:

## 2019-05-24 NOTE — ED PROVIDER NOTE - PHYSICAL EXAMINATION
Vital Signs: I have reviewed the initial vital signs.  Constitutional: NAD, well-nourished, appears stated age, no acute distress.  HEENT: Airway patent, moist MM, no erythema/swelling/deformity of oral structures. EOMI, PERRLA.  CV: regular rate, regular rhythm, well-perfused extremities, 2+ b/l DP and radial pulses equal.  Lungs: BCTA, no increased WOB.  ABD: (+) Suprapubic fullness with mild tenderness, but no tenderness elsewhere, no guarding or rebound, no pulsatile mass, no hernias.   MSK: Neck supple, nontender, nl ROM, no stepoff. Chest nontender. Back nontender in TLS spine or to b/l bony structures or flanks. Ext nontender, nl rom, no deformity.   INTEG: Skin warm, dry, no rash.  NEURO: A&Ox3, normal strength, nl sensation throughout, normal speech.   PSYCH: Calm, cooperative, normal affect and interaction. Vital Signs: I have reviewed the initial vital signs.  Constitutional: NAD, well-nourished, appears stated age, no acute distress.  HEENT: Airway patent, moist MM, no erythema/swelling/deformity of oral structures. EOMI, PERRLA.  CV: regular rate, regular rhythm, well-perfused extremities, 2+ b/l DP and radial pulses equal.  Lungs: BCTA, no increased WOB.  ABD: (+) Suprapubic fullness with mild tenderness, but no tenderness elsewhere, no guarding or rebound, no pulsatile mass, no hernias. No CVA tenderness  MSK: Neck supple, nontender, nl ROM, no stepoff. Chest nontender. Back nontender in TLS spine or to b/l bony structures or flanks. Ext nontender, nl rom, no deformity.   INTEG: Skin warm, dry, no rash.  NEURO: A&Ox3, normal strength, nl sensation throughout, normal speech.   PSYCH: Calm, cooperative, normal affect and interaction.

## 2019-05-24 NOTE — ED PROVIDER NOTE - NSFOLLOWUPINSTRUCTIONS_ED_ALL_ED_FT
Talamantes Catheter Placement and Care    WHAT YOU NEED TO KNOW:    What is a Talamantes catheter? A Talamantes catheter is a sterile tube that is inserted into your bladder to drain urine. It is also called an indwelling urinary catheter. The tip of the catheter has a small balloon filled with solution that holds the catheter in your bladder.               How is a Talamantes catheter placed?     Your healthcare provider will clean your genital area with a sterile solution. He or she will put lubricant jelly on the catheter to help it go in smoothly. The end of the catheter with the deflated balloon will be inserted into your urethra. The catheter will be moved slowly and gently into your bladder. You may be asked to take slow, deep breaths or to push as if you were trying to urinate as the catheter is inserted.      When your healthcare provider sees urine flowing from the catheter, he or she will fill the balloon at the end of the catheter. The balloon holds the catheter in place so it does not come out. Your healthcare provider will attach the open end of the catheter to a sterile drainage bag.    How do I care for my Talamantes catheter?     Clean your genital area 2 times every day. Clean your catheter and the area around where it was inserted. Use soap and water. Clean your anal opening and catheter area after every bowel movement.       Secure the catheter tube so you do not pull or move the catheter. This helps prevent pain and bladder spasms. Healthcare providers will show you how to use medical tape or a strap to secure the catheter tube to your body.       Keep a closed drainage system. Your Talamantes catheter should always be attached to the drainage bag to form a closed system. Do not disconnect any part of the closed system unless you need to change the bag.    How do I care for my drainage bag?     Ask if a leg bag is right for you. A leg bag can be worn under your clothes. Ask your healthcare provider for more information about a leg bag.       Keep the drainage bag below the level of your waist. This helps stop urine from moving back up the tubing and into your bladder. Do not loop or kink the tubing. This can cause urine to back up and collect in your bladder. Do not let the drainage bag touch or lie on the floor.      Empty the drainage bag when needed. The weight of a full drainage bag can be painful. Empty the drainage bag every 3 to 6 hours or when it is ? full.       Clean and change the drainage bag as directed. Ask your healthcare provider how often you should change the drainage bag and what cleaning solution to use. Wear disposable gloves when you change the bag. Do not allow the end of the catheter or tubing to touch anything. Clean the ends with an alcohol pad before you reconnect them.    What can I do if problems develop?    No urine is draining into the bag:   Check for kinks in the tubing and straighten them out.       Check the tape or strap used to secure the catheter tube to your skin. Make sure it is not blocking the tube.       Make sure you are not sitting or lying on the tubing.      Make sure the urine bag is hanging below the level of your waist.      Urine leaks from or around the catheter, tubing, or drainage bag: Check if the closed drainage system has accidently come open or apart. Clean the catheter and tubing ends with a new alcohol pad and reconnect them.     What are the risks of a Talamantes catheter? You may develop an infection caused by bacteria. This can happen when the drainage system is opened and bacteria get inside the tubing. You can also get an infection if the catheter equipment is not cleaned well or you do not wash your hands. The infection can spread to your bladder or other organs, and may become severe.    How can I help prevent an infection?     Wash your hands often. Wash before and after you touch your catheter, tubing, or drainage bag. Use soap and water. Wear clean disposable gloves when you care for your catheter or disconnect the drainage bag. Wash your hands before you prepare or eat food. Handwashing           Drink liquids as directed. Ask your healthcare provider how much liquid to drink each day and which liquids are best for you. Liquids will help flush your kidneys and bladder to help prevent infection.    When should I seek immediate care?     Your catheter comes out.       You suddenly have material that looks like sand in the tubing or drainage bag.      No urine is draining into the bag and you have checked the system.      You have pain in your hip, back, pelvis, or lower abdomen.      You are confused or cannot think clearly.    When should I contact my healthcare provider?     You have a fever.      You have bladder spasms for more than 1 day after the catheter is placed.      You see blood in the tubing or drainage bag.      You have a rash or itching where the catheter tube is secured to your skin.      Urine leaks from or around the catheter, tubing, or drainage bag.      The closed drainage system has accidently come open or apart.       You see a layer of crystals inside the tubing.      You have questions or concerns about your condition or care.    CARE AGREEMENT:    You have the right to help plan your care. Learn about your health condition and how it may be treated. Discuss treatment options with your healthcare providers to decide what care you want to receive. You always have the right to refuse treatment.        © Copyright [employer] 2019 All illustrations and images included in CareNotes are the copyrighted property of [employer] or [employer]

## 2019-05-24 NOTE — ED ADULT TRIAGE NOTE - CHIEF COMPLAINT QUOTE
urinary frequency states he goes to urinate every 10 minutes and has over active bladder, hx of prostate CA, sent by his doctor for further evaluation

## 2019-05-24 NOTE — ED PROVIDER NOTE - OBJECTIVE STATEMENT
74 year old male, pmhx HTN, DM, Prostate CA in remission with residual intermittent urinary retention, anemia, PVD, presenting with a 3-4 day history of worsening urinary frequency with retention. States he has been having the urge to urinate frequently but when he does, only a little bit comes out. States he has had this chronically since his prostate CA but states it is worse over the past few days. Sees a urologist in [place] for this but has not yet been able to follow up. States he saw his PMD today who sent him to the ED for evaluation. Denies other symptoms or complaints including fevers, headache, vision changes, weakness/numbness, confusion, URI symptoms, neck pain, chest pain, back pain, dyspnea, cough, palpitations, nausea, vomiting, abdominal pain, diarrhea, constipation, blood in stool/dark stools, penile discharge/testicular pain, leg swelling, rash, recent travel or sick contacts.

## 2019-05-29 ENCOUNTER — FORM ENCOUNTER (OUTPATIENT)
Age: 74
End: 2019-05-29

## 2019-05-30 ENCOUNTER — OUTPATIENT (OUTPATIENT)
Dept: OUTPATIENT SERVICES | Facility: HOSPITAL | Age: 74
LOS: 1 days | Discharge: HOME | End: 2019-05-30
Payer: MEDICARE

## 2019-05-30 ENCOUNTER — APPOINTMENT (OUTPATIENT)
Dept: UROLOGY | Facility: CLINIC | Age: 74
End: 2019-05-30
Payer: MEDICARE

## 2019-05-30 ENCOUNTER — OUTPATIENT (OUTPATIENT)
Dept: OUTPATIENT SERVICES | Facility: HOSPITAL | Age: 74
LOS: 1 days | Discharge: HOME | End: 2019-05-30

## 2019-05-30 VITALS — WEIGHT: 203 LBS | SYSTOLIC BLOOD PRESSURE: 146 MMHG | HEART RATE: 73 BPM | DIASTOLIC BLOOD PRESSURE: 80 MMHG

## 2019-05-30 DIAGNOSIS — R33.8 OTHER RETENTION OF URINE: ICD-10-CM

## 2019-05-30 DIAGNOSIS — Z85.46 PERSONAL HISTORY OF MALIGNANT NEOPLASM OF PROSTATE: ICD-10-CM

## 2019-05-30 DIAGNOSIS — Z78.9 OTHER SPECIFIED HEALTH STATUS: ICD-10-CM

## 2019-05-30 PROCEDURE — 76856 US EXAM PELVIC COMPLETE: CPT | Mod: 26

## 2019-05-30 PROCEDURE — 99205 OFFICE O/P NEW HI 60 MIN: CPT

## 2019-05-30 RX ORDER — LISINOPRIL 30 MG/1
TABLET ORAL
Refills: 0 | Status: ACTIVE | COMMUNITY

## 2019-05-30 RX ORDER — MIRABEGRON 50 MG/1
50 TABLET, FILM COATED, EXTENDED RELEASE ORAL
Refills: 0 | Status: ACTIVE | COMMUNITY

## 2019-05-30 RX ORDER — SITAGLIPTIN AND METFORMIN HYDROCHLORIDE 50; 1000 MG/1; MG/1
TABLET, FILM COATED ORAL
Refills: 0 | Status: ACTIVE | COMMUNITY

## 2019-05-30 RX ORDER — CILOSTAZOL 100 MG/1
TABLET ORAL
Refills: 0 | Status: ACTIVE | COMMUNITY

## 2019-05-30 RX ORDER — TAMSULOSIN HYDROCHLORIDE 0.4 MG/1
CAPSULE ORAL
Refills: 0 | Status: ACTIVE | COMMUNITY

## 2019-05-30 RX ORDER — FINASTERIDE 1 MG/1
TABLET ORAL
Refills: 0 | Status: ACTIVE | COMMUNITY

## 2019-05-30 RX ORDER — GLIPIZIDE 2.5 MG/1
TABLET ORAL
Refills: 0 | Status: ACTIVE | COMMUNITY

## 2019-05-30 NOTE — ASSESSMENT
[FreeTextEntry1] : This is a 74 year male who presents for evaluation.  Was in the ER and had palomo placed for urinary retention. Was placed last firday. was told that he had bacteria in the bladder -- was given cefdinir.  has been told that he has a large prostate one year ago -- urologist in [place] ([place]) treated with radiation.  Was was high PSA.\par Currently on both finasteride, flomax, myrbetriq and oxybutyinin was recently completed. \par had a catheter placed in the past a few months ago as well. 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

## 2019-05-30 NOTE — HISTORY OF PRESENT ILLNESS
[FreeTextEntry1] : This is a 74 year male who presents for evaluation.  Was in the ER and had palomo placed for urinary retention. Was placed last firday. was told that he had bacteria in the bladder -- was given cefdinir.  has been told that he has a large prostate one year ago -- urologist in [place] ([place]) treated with radiation.  Was was high PSA.\par Currently on both finasteride, flomax, myrbetriq and oxybutyinin was recently completed. \par had a catheter placed in the past a few months ago as well. was told that he has urethral stricture at the prostate due to radation -- needed to have incision of bladder neck in the past last year.\par

## 2019-05-30 NOTE — PHYSICAL EXAM
[General Appearance - Well Developed] : well developed [General Appearance - Well Nourished] : well nourished [Normal Appearance] : normal appearance [Well Groomed] : well groomed [General Appearance - In No Acute Distress] : no acute distress [Abdomen Soft] : soft [Abdomen Tenderness] : non-tender [Costovertebral Angle Tenderness] : no ~M costovertebral angle tenderness [Skin Color & Pigmentation] : normal skin color and pigmentation [Edema] : no peripheral edema [] : no respiratory distress [Respiration, Rhythm And Depth] : normal respiratory rhythm and effort [Exaggerated Use Of Accessory Muscles For Inspiration] : no accessory muscle use [Oriented To Time, Place, And Person] : oriented to person, place, and time [Affect] : the affect was normal [Mood] : the mood was normal [Not Anxious] : not anxious [Normal Station and Gait] : the gait and station were normal for the patient's age [No Focal Deficits] : no focal deficits [No Palpable Adenopathy] : no palpable adenopathy

## 2019-06-03 DIAGNOSIS — Z02.9 ENCOUNTER FOR ADMINISTRATIVE EXAMINATIONS, UNSPECIFIED: ICD-10-CM

## 2019-06-05 ENCOUNTER — APPOINTMENT (OUTPATIENT)
Dept: UROLOGY | Facility: CLINIC | Age: 74
End: 2019-06-05
Payer: MEDICARE

## 2019-06-05 VITALS — BODY MASS INDEX: 28.42 KG/M2 | HEIGHT: 71 IN | WEIGHT: 203 LBS

## 2019-06-05 DIAGNOSIS — Z87.448 PERSONAL HISTORY OF OTHER DISEASES OF URINARY SYSTEM: ICD-10-CM

## 2019-06-05 PROCEDURE — 99214 OFFICE O/P EST MOD 30 MIN: CPT

## 2019-06-10 ENCOUNTER — APPOINTMENT (OUTPATIENT)
Dept: CARDIOLOGY | Facility: CLINIC | Age: 74
End: 2019-06-10
Payer: MEDICARE

## 2019-06-10 PROCEDURE — 99214 OFFICE O/P EST MOD 30 MIN: CPT

## 2019-06-10 PROCEDURE — 93000 ELECTROCARDIOGRAM COMPLETE: CPT

## 2019-06-17 NOTE — HISTORY OF PRESENT ILLNESS
[FreeTextEntry1] : This is a 74 year male who presents for evaluation. Was in the ER and had palomo placed for urinary retention. was told that he had bacteria in the bladder -- was given cefdinir. has been told that he has a large prostate one year ago -- urologist in [place] ([place]) treated with radiation. \par Currently on both finasteride, flomax, myrbetriq and oxybutyinin was recently completed. \par had a catheter placed in the past a few months ago as well. 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 I asked him to also stop myrbetriq last vist\par  \par US Prostate, 2.8, x4, x4cm.  about 25g\par \par cysto today showed bladder stone\par patient needs to get prostate biopsy results from past urologist\par

## 2019-06-17 NOTE — PHYSICAL EXAM
[General Appearance - Well Developed] : well developed [General Appearance - Well Nourished] : well nourished [Normal Appearance] : normal appearance [Well Groomed] : well groomed [Abdomen Tenderness] : non-tender [Abdomen Soft] : soft [General Appearance - In No Acute Distress] : no acute distress [Skin Color & Pigmentation] : normal skin color and pigmentation [Costovertebral Angle Tenderness] : no ~M costovertebral angle tenderness [Edema] : no peripheral edema [Respiration, Rhythm And Depth] : normal respiratory rhythm and effort [] : no respiratory distress [Exaggerated Use Of Accessory Muscles For Inspiration] : no accessory muscle use [Mood] : the mood was normal [Affect] : the affect was normal [Oriented To Time, Place, And Person] : oriented to person, place, and time [Not Anxious] : not anxious [Normal Station and Gait] : the gait and station were normal for the patient's age [No Palpable Adenopathy] : no palpable adenopathy [No Focal Deficits] : no focal deficits

## 2019-06-18 ENCOUNTER — OUTPATIENT (OUTPATIENT)
Dept: OUTPATIENT SERVICES | Facility: HOSPITAL | Age: 74
LOS: 1 days | Discharge: HOME | End: 2019-06-18
Payer: MEDICARE

## 2019-06-18 ENCOUNTER — APPOINTMENT (OUTPATIENT)
Dept: UROLOGY | Facility: HOSPITAL | Age: 74
End: 2019-06-18
Payer: MEDICARE

## 2019-06-18 ENCOUNTER — RESULT REVIEW (OUTPATIENT)
Age: 74
End: 2019-06-18

## 2019-06-18 VITALS
TEMPERATURE: 96 F | RESPIRATION RATE: 16 BRPM | SYSTOLIC BLOOD PRESSURE: 163 MMHG | HEART RATE: 75 BPM | WEIGHT: 199.96 LBS | OXYGEN SATURATION: 94 % | HEIGHT: 70 IN | DIASTOLIC BLOOD PRESSURE: 77 MMHG

## 2019-06-18 VITALS — SYSTOLIC BLOOD PRESSURE: 179 MMHG | DIASTOLIC BLOOD PRESSURE: 79 MMHG

## 2019-06-18 DIAGNOSIS — Z98.890 OTHER SPECIFIED POSTPROCEDURAL STATES: Chronic | ICD-10-CM

## 2019-06-18 DIAGNOSIS — Z95.5 PRESENCE OF CORONARY ANGIOPLASTY IMPLANT AND GRAFT: Chronic | ICD-10-CM

## 2019-06-18 LAB — GLUCOSE BLDC GLUCOMTR-MCNC: 123 MG/DL — HIGH (ref 70–99)

## 2019-06-18 PROCEDURE — 88305 TISSUE EXAM BY PATHOLOGIST: CPT | Mod: 26

## 2019-06-18 PROCEDURE — 52318 REMOVE BLADDER STONE: CPT

## 2019-06-18 RX ORDER — DOCUSATE SODIUM 100 MG
1 CAPSULE ORAL
Qty: 90 | Refills: 0
Start: 2019-06-18 | End: 2019-07-17

## 2019-06-18 RX ORDER — AMLODIPINE BESYLATE 2.5 MG/1
0 TABLET ORAL
Qty: 0 | Refills: 0 | DISCHARGE

## 2019-06-18 RX ORDER — AZTREONAM 2 G
1 VIAL (EA) INJECTION
Qty: 10 | Refills: 0
Start: 2019-06-18 | End: 2019-06-22

## 2019-06-18 RX ORDER — HYDROMORPHONE HYDROCHLORIDE 2 MG/ML
0.5 INJECTION INTRAMUSCULAR; INTRAVENOUS; SUBCUTANEOUS
Refills: 0 | Status: DISCONTINUED | OUTPATIENT
Start: 2019-06-18 | End: 2019-06-18

## 2019-06-18 RX ORDER — FESOTERODINE FUMARATE 8 MG/1
0 TABLET, FILM COATED, EXTENDED RELEASE ORAL
Qty: 0 | Refills: 0 | DISCHARGE

## 2019-06-18 RX ORDER — OXYCODONE AND ACETAMINOPHEN 5; 325 MG/1; MG/1
1 TABLET ORAL ONCE
Refills: 0 | Status: DISCONTINUED | OUTPATIENT
Start: 2019-06-18 | End: 2019-06-18

## 2019-06-18 RX ORDER — ROPINIROLE 8 MG/1
0 TABLET, FILM COATED, EXTENDED RELEASE ORAL
Qty: 0 | Refills: 0 | DISCHARGE

## 2019-06-18 RX ORDER — MORPHINE SULFATE 50 MG/1
2 CAPSULE, EXTENDED RELEASE ORAL
Refills: 0 | Status: DISCONTINUED | OUTPATIENT
Start: 2019-06-18 | End: 2019-06-18

## 2019-06-18 RX ORDER — ASPIRIN/CALCIUM CARB/MAGNESIUM 324 MG
1 TABLET ORAL
Qty: 0 | Refills: 0 | DISCHARGE

## 2019-06-18 RX ORDER — ONDANSETRON 8 MG/1
4 TABLET, FILM COATED ORAL ONCE
Refills: 0 | Status: DISCONTINUED | OUTPATIENT
Start: 2019-06-18 | End: 2019-07-03

## 2019-06-18 RX ORDER — PHENAZOPYRIDINE HCL 100 MG
1 TABLET ORAL
Qty: 4 | Refills: 0
Start: 2019-06-18 | End: 2019-06-19

## 2019-06-18 RX ORDER — ISOSORBIDE MONONITRATE 60 MG/1
1 TABLET, EXTENDED RELEASE ORAL
Qty: 0 | Refills: 0 | DISCHARGE

## 2019-06-18 RX ORDER — ALFUZOSIN HYDROCHLORIDE 10 MG/1
1 TABLET, EXTENDED RELEASE ORAL
Qty: 0 | Refills: 0 | DISCHARGE

## 2019-06-18 RX ORDER — SODIUM CHLORIDE 9 MG/ML
1000 INJECTION, SOLUTION INTRAVENOUS
Refills: 0 | Status: DISCONTINUED | OUTPATIENT
Start: 2019-06-18 | End: 2019-07-03

## 2019-06-18 RX ADMIN — SODIUM CHLORIDE 100 MILLILITER(S): 9 INJECTION, SOLUTION INTRAVENOUS at 10:40

## 2019-06-18 NOTE — PRE-ANESTHESIA EVALUATION ADULT - NSANTHOSAYNRD_GEN_A_CORE
No. GALINA screening performed.  STOP BANG Legend: 0-2 = LOW Risk; 3-4 = INTERMEDIATE Risk; 5-8 = HIGH Risk

## 2019-06-18 NOTE — BRIEF OPERATIVE NOTE - NSICDXBRIEFPREOP_GEN_ALL_CORE_FT
PRE-OP DIAGNOSIS:  Stone, bladder 18-Jun-2019 10:59:10  Scar Butterfield A
PRE-OP DIAGNOSIS:  Bladder stone 18-Jun-2019 11:06:03  Scar Butterfield  Stone, bladder 18-Jun-2019 10:59:10  Scar Butterfield

## 2019-06-18 NOTE — BRIEF OPERATIVE NOTE - NSICDXBRIEFPROCEDURE_GEN_ALL_CORE_FT
PROCEDURES:  Cystoscopic removal of bladder stone 18-Jun-2019 10:59:01  Scar Butterfield
PROCEDURES:  Cystoscopic removal of bladder stone 18-Jun-2019 11:05:51  Scar Butterfield  Cystoscopic removal of bladder stone 18-Jun-2019 10:59:01  Scar Butterfield

## 2019-06-18 NOTE — H&P PST ADULT - HISTORY OF PRESENT ILLNESS
HPI: pt is a 73yo M w/ PMH DM, HTN, CAD being admitted for cystolithopaxy for tx of bladder stones. Pt reports urinary catheter x1wk, with concentrated urine in bag. Pt denies abdominal pain, pelvic pain, difficulty urinating, fever, chills, pain w/ urination, SOB, or chest pain.

## 2019-06-18 NOTE — H&P PST ADULT - NSICDXPASTMEDICALHX_GEN_ALL_CORE_FT
PAST MEDICAL HISTORY:  Anemia     CAD, multiple vessel     Diabetes mellitus type 2    Hypertension     Prostate CA with radiation    PVD (peripheral vascular disease)

## 2019-06-18 NOTE — ASU DISCHARGE PLAN (ADULT/PEDIATRIC) - CARE PROVIDER_API CALL
Scar Butterfield)  [facility]  [address] [place] [address], [address]  [place] [address]  Phone: [phone]  Fax: [phone]  Follow Up Time:

## 2019-06-18 NOTE — BRIEF OPERATIVE NOTE - OPERATION/FINDINGS
bladder stone in bladder with some epithelialization --- radiation cystitis through out bladder and radiation changes to prostate

## 2019-06-21 ENCOUNTER — APPOINTMENT (OUTPATIENT)
Dept: UROLOGY | Facility: CLINIC | Age: 74
End: 2019-06-21
Payer: MEDICARE

## 2019-06-21 VITALS
HEIGHT: 71 IN | BODY MASS INDEX: 28.42 KG/M2 | HEART RATE: 82 BPM | DIASTOLIC BLOOD PRESSURE: 89 MMHG | WEIGHT: 203 LBS | SYSTOLIC BLOOD PRESSURE: 152 MMHG

## 2019-06-21 PROBLEM — I25.10 ATHEROSCLEROTIC HEART DISEASE OF NATIVE CORONARY ARTERY WITHOUT ANGINA PECTORIS: Chronic | Status: ACTIVE | Noted: 2019-06-18

## 2019-06-21 LAB — SURGICAL PATHOLOGY STUDY: SIGNIFICANT CHANGE UP

## 2019-06-21 PROCEDURE — 99213 OFFICE O/P EST LOW 20 MIN: CPT | Mod: 24

## 2019-06-22 DIAGNOSIS — C67.5 MALIGNANT NEOPLASM OF BLADDER NECK: ICD-10-CM

## 2019-06-22 DIAGNOSIS — N21.0 CALCULUS IN BLADDER: ICD-10-CM

## 2019-06-22 LAB — COMPN STONE: SIGNIFICANT CHANGE UP

## 2019-06-23 NOTE — ASSESSMENT
[FreeTextEntry1] : This is a 74 year male who presents for evaluation. Was in the ER and had palomo placed for urinary retention. was told that he had bacteria in the bladder -- was given cefdinir. has been told that he has a large prostate one year ago -- urologist in [place] ([place]) treated with radiation. \par Currently on both finasteride, flomax, myrbetriq and oxybutyinin was recently completed. \par had a catheter placed in the past a few months ago as well. 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 I asked him to also stop myrbetriq last vist\par  \par US Prostate, 2.8, x4, x4cm.  about 25g\par \par cysto showed bladder stone and he is s/p Cystolitholapaxy on 6/18/19. He presents today for Palomo catheter removal.

## 2019-06-23 NOTE — HISTORY OF PRESENT ILLNESS
[FreeTextEntry1] : This is a 74 year male who presents for evaluation. Was in the ER and had palomo placed for urinary retention. was told that he had bacteria in the bladder -- was given cefdinir. has been told that he has a large prostate one year ago -- urologist in [place] ([place]) treated with radiation. \par Currently on both finasteride, flomax, myrbetriq and oxybutyinin was recently completed. \par had a catheter placed in the past a few months ago as well. 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 I asked him to also stop myrbetriq last vist\par  \par US Prostate, 2.8, x4, x4cm.  about 25g\par \par cysto showed bladder stone and he is s/p Cystolitholapaxy on 6/18/19. He presents today for Palomo catheter removal.\par

## 2019-06-23 NOTE — PHYSICAL EXAM
[General Appearance - Well Developed] : well developed [General Appearance - Well Nourished] : well nourished [Normal Appearance] : normal appearance [General Appearance - In No Acute Distress] : no acute distress [Well Groomed] : well groomed [Abdomen Soft] : soft [Abdomen Tenderness] : non-tender [Costovertebral Angle Tenderness] : no ~M costovertebral angle tenderness [Urethral Meatus] : meatus normal [Urinary Bladder Findings] : the bladder was normal on palpation [Scrotum] : the scrotum was normal [Testes Mass (___cm)] : there were no testicular masses [Edema] : no peripheral edema [] : no respiratory distress [Respiration, Rhythm And Depth] : normal respiratory rhythm and effort [Exaggerated Use Of Accessory Muscles For Inspiration] : no accessory muscle use [Oriented To Time, Place, And Person] : oriented to person, place, and time [Affect] : the affect was normal [Not Anxious] : not anxious [Mood] : the mood was normal [Normal Station and Gait] : the gait and station were normal for the patient's age [No Focal Deficits] : no focal deficits [Skin Color & Pigmentation] : normal skin color and pigmentation [FreeTextEntry1] : Talamantes catheter in place and draining clear urine.

## 2019-06-28 ENCOUNTER — APPOINTMENT (OUTPATIENT)
Dept: UROLOGY | Facility: CLINIC | Age: 74
End: 2019-06-28
Payer: MEDICARE

## 2019-06-28 PROCEDURE — 99213 OFFICE O/P EST LOW 20 MIN: CPT

## 2019-06-28 NOTE — ASSESSMENT
[FreeTextEntry1] : This is a 74 year male who presents for evaluation. Was in the ER and had palomo placed for urinary retention. was told that he had bacteria in the bladder -- was given cefdinir. has been told that he has a large prostate one year ago -- urologist in [place] ([place]) treated with radiation (2 years ago). \par Currently on both finasteride, flomax, myrbetriq and oxybutyinin was recently completed. \par had a catheter placed in the past a few months ago as well. 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 I asked him to also stop myrbetriq last vist\par  \par US Prostate, 2.8, x4, x4cm. about 25g\par \par cysto showed bladder stone and he is s/p Cystolitholapaxy on 6/18/19.  has been voiding much better since the procedure\par but has had bleeding for the last year and wishes for us to resolve this as well\par \par Patient still to bring me notes from [place] urologist detailing status of prostate cancer\par at the next visit

## 2019-06-28 NOTE — PHYSICAL EXAM
[General Appearance - Well Developed] : well developed [General Appearance - Well Nourished] : well nourished [Normal Appearance] : normal appearance [Well Groomed] : well groomed [General Appearance - In No Acute Distress] : no acute distress [Abdomen Soft] : soft [Abdomen Tenderness] : non-tender [Costovertebral Angle Tenderness] : no ~M costovertebral angle tenderness [Skin Color & Pigmentation] : normal skin color and pigmentation [] : no respiratory distress [Edema] : no peripheral edema [Exaggerated Use Of Accessory Muscles For Inspiration] : no accessory muscle use [Respiration, Rhythm And Depth] : normal respiratory rhythm and effort [Oriented To Time, Place, And Person] : oriented to person, place, and time [Affect] : the affect was normal [Mood] : the mood was normal [Not Anxious] : not anxious [Normal Station and Gait] : the gait and station were normal for the patient's age [No Focal Deficits] : no focal deficits

## 2019-06-28 NOTE — HISTORY OF PRESENT ILLNESS
[FreeTextEntry1] : This is a 74 year male who presents for evaluation. Was in the ER and had palomo placed for urinary retention. was told that he had bacteria in the bladder -- was given cefdinir. has been told that he has a large prostate one year ago -- urologist in [place] ([place]) treated with radiation (2 years ago). \par Currently on both finasteride, flomax, myrbetriq and oxybutyinin was recently completed. \par had a catheter placed in the past a few months ago as well. 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 I asked him to also stop myrbetriq last vist\par  \par US Prostate, 2.8, x4, x4cm. about 25g\par \par cysto showed bladder stone and he is s/p Cystolitholapaxy on 6/18/19.  has been voiding much better since the procedure\par but has had bleeding for the last year and wishes for us to resolve this as well\par \par Patient still to bring me notes from [place] urologist detailing status of prostate cancer\par at the next visit

## 2019-07-15 ENCOUNTER — APPOINTMENT (OUTPATIENT)
Dept: UROLOGY | Facility: CLINIC | Age: 74
End: 2019-07-15
Payer: MEDICARE

## 2019-07-15 PROCEDURE — 52000 CYSTOURETHROSCOPY: CPT

## 2019-07-15 PROCEDURE — 99213 OFFICE O/P EST LOW 20 MIN: CPT | Mod: 25

## 2019-07-25 ENCOUNTER — APPOINTMENT (OUTPATIENT)
Dept: CARDIOLOGY | Facility: CLINIC | Age: 74
End: 2019-07-25
Payer: MEDICARE

## 2019-07-25 PROCEDURE — 99213 OFFICE O/P EST LOW 20 MIN: CPT

## 2019-07-25 PROCEDURE — 93000 ELECTROCARDIOGRAM COMPLETE: CPT

## 2019-07-30 ENCOUNTER — EMERGENCY (EMERGENCY)
Facility: HOSPITAL | Age: 74
LOS: 0 days | Discharge: HOME | End: 2019-07-30
Attending: EMERGENCY MEDICINE | Admitting: EMERGENCY MEDICINE
Payer: MEDICARE

## 2019-07-30 ENCOUNTER — APPOINTMENT (OUTPATIENT)
Dept: UROLOGY | Facility: HOSPITAL | Age: 74
End: 2019-07-30
Payer: MEDICARE

## 2019-07-30 ENCOUNTER — RESULT REVIEW (OUTPATIENT)
Age: 74
End: 2019-07-30

## 2019-07-30 ENCOUNTER — OUTPATIENT (OUTPATIENT)
Dept: OUTPATIENT SERVICES | Facility: HOSPITAL | Age: 74
LOS: 1 days | Discharge: HOME | End: 2019-07-30
Payer: MEDICARE

## 2019-07-30 VITALS
RESPIRATION RATE: 17 BRPM | SYSTOLIC BLOOD PRESSURE: 154 MMHG | OXYGEN SATURATION: 97 % | DIASTOLIC BLOOD PRESSURE: 75 MMHG | HEART RATE: 73 BPM | TEMPERATURE: 97 F | WEIGHT: 201.94 LBS

## 2019-07-30 VITALS — DIASTOLIC BLOOD PRESSURE: 69 MMHG | HEART RATE: 65 BPM | RESPIRATION RATE: 17 BRPM | SYSTOLIC BLOOD PRESSURE: 147 MMHG

## 2019-07-30 VITALS
HEART RATE: 101 BPM | SYSTOLIC BLOOD PRESSURE: 162 MMHG | RESPIRATION RATE: 20 BRPM | DIASTOLIC BLOOD PRESSURE: 88 MMHG | OXYGEN SATURATION: 96 % | TEMPERATURE: 96 F

## 2019-07-30 VITALS
TEMPERATURE: 97 F | SYSTOLIC BLOOD PRESSURE: 161 MMHG | DIASTOLIC BLOOD PRESSURE: 80 MMHG | RESPIRATION RATE: 18 BRPM | HEART RATE: 98 BPM | OXYGEN SATURATION: 96 %

## 2019-07-30 DIAGNOSIS — Z95.5 PRESENCE OF CORONARY ANGIOPLASTY IMPLANT AND GRAFT: Chronic | ICD-10-CM

## 2019-07-30 DIAGNOSIS — Z98.890 OTHER SPECIFIED POSTPROCEDURAL STATES: Chronic | ICD-10-CM

## 2019-07-30 DIAGNOSIS — T83.098A OTHER MECHANICAL COMPLICATION OF OTHER URINARY CATHETER, INITIAL ENCOUNTER: ICD-10-CM
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
## 2019-07-30 LAB — GLUCOSE BLDC GLUCOMTR-MCNC: 151 MG/DL — HIGH (ref 70–99)

## 2019-07-30 PROCEDURE — 88342 IMHCHEM/IMCYTCHM 1ST ANTB: CPT | Mod: 26,59

## 2019-07-30 PROCEDURE — 99284 EMERGENCY DEPT VISIT MOD MDM: CPT

## 2019-07-30 PROCEDURE — 52234 CYSTOSCOPY AND TREATMENT: CPT | Mod: 59

## 2019-07-30 PROCEDURE — 88341 IMHCHEM/IMCYTCHM EA ADD ANTB: CPT | Mod: 26

## 2019-07-30 PROCEDURE — 88307 TISSUE EXAM BY PATHOLOGIST: CPT | Mod: 26

## 2019-07-30 PROCEDURE — 88344 IMHCHEM/IMCYTCHM EA MLT ANTB: CPT | Mod: 26

## 2019-07-30 RX ORDER — SODIUM CHLORIDE 9 MG/ML
1000 INJECTION, SOLUTION INTRAVENOUS
Refills: 0 | Status: DISCONTINUED | OUTPATIENT
Start: 2019-07-30 | End: 2019-08-15

## 2019-07-30 RX ORDER — PHENAZOPYRIDINE HCL 100 MG
200 TABLET ORAL ONCE
Refills: 0 | Status: COMPLETED | OUTPATIENT
Start: 2019-07-30 | End: 2019-07-30

## 2019-07-30 RX ORDER — ONDANSETRON 8 MG/1
4 TABLET, FILM COATED ORAL ONCE
Refills: 0 | Status: DISCONTINUED | OUTPATIENT
Start: 2019-07-30 | End: 2019-08-15

## 2019-07-30 RX ORDER — FINASTERIDE 5 MG/1
1 TABLET, FILM COATED ORAL
Qty: 0 | Refills: 0 | DISCHARGE

## 2019-07-30 RX ORDER — CLOPIDOGREL BISULFATE 75 MG/1
1 TABLET, FILM COATED ORAL
Qty: 0 | Refills: 0 | DISCHARGE

## 2019-07-30 RX ORDER — ACETAMINOPHEN 500 MG
2 TABLET ORAL
Qty: 0 | Refills: 0 | DISCHARGE
Start: 2019-07-30

## 2019-07-30 RX ORDER — OXYCODONE AND ACETAMINOPHEN 5; 325 MG/1; MG/1
1 TABLET ORAL EVERY 4 HOURS
Refills: 0 | Status: DISCONTINUED | OUTPATIENT
Start: 2019-07-30 | End: 2019-07-30

## 2019-07-30 RX ORDER — ACETAMINOPHEN 500 MG
650 TABLET ORAL ONCE
Refills: 0 | Status: DISCONTINUED | OUTPATIENT
Start: 2019-07-30 | End: 2019-08-15

## 2019-07-30 RX ORDER — MORPHINE SULFATE 50 MG/1
2 CAPSULE, EXTENDED RELEASE ORAL
Refills: 0 | Status: DISCONTINUED | OUTPATIENT
Start: 2019-07-30 | End: 2019-07-30

## 2019-07-30 RX ORDER — OXYBUTYNIN CHLORIDE 5 MG
1 TABLET ORAL
Qty: 6 | Refills: 0
Start: 2019-07-30 | End: 2019-08-01

## 2019-07-30 RX ORDER — ACETAMINOPHEN 500 MG
650 TABLET ORAL ONCE
Refills: 0 | Status: COMPLETED | OUTPATIENT
Start: 2019-07-30 | End: 2019-07-30

## 2019-07-30 RX ORDER — RANOLAZINE 500 MG/1
1 TABLET, FILM COATED, EXTENDED RELEASE ORAL
Qty: 0 | Refills: 0 | DISCHARGE

## 2019-07-30 RX ORDER — AZTREONAM 2 G
1 VIAL (EA) INJECTION
Qty: 10 | Refills: 0
Start: 2019-07-30 | End: 2019-08-03

## 2019-07-30 RX ORDER — PHENAZOPYRIDINE HCL 100 MG
1 TABLET ORAL
Qty: 4 | Refills: 0
Start: 2019-07-30 | End: 2019-07-31

## 2019-07-30 RX ADMIN — Medication 200 MILLIGRAM(S): at 10:51

## 2019-07-30 RX ADMIN — SODIUM CHLORIDE 100 MILLILITER(S): 9 INJECTION, SOLUTION INTRAVENOUS at 10:21

## 2019-07-30 RX ADMIN — Medication 650 MILLIGRAM(S): at 22:50

## 2019-07-30 NOTE — ED PROVIDER NOTE - PHYSICAL EXAMINATION
VITAL SIGNS: I have reviewed nursing notes and confirm.  CONSTITUTIONAL: Well-developed; well-nourished; in no acute distress.  SKIN: Skin exam is warm and dry, no acute rash.  HEAD: Normocephalic; atraumatic.  NECK: Supple; non tender.  CARD: RRR, no murmur  RESP: No wheezes, rales or rhonchi.  ABD: Normal bowel sounds; soft; non-distended, no palpable bladder fullness, tenderness  EXT: Normal ROM. No clubbing, cyanosis or edema.  NEURO: Alert, oriented. Grossly unremarkable. No focal deficits.  PSYCH: Cooperative, appropriate.

## 2019-07-30 NOTE — ED PROVIDER NOTE - NSFOLLOWUPINSTRUCTIONS_ED_ALL_ED_FT
Talamantes Catheter Placement and Care    WHAT YOU NEED TO KNOW:    A Talamantes catheter is a sterile tube that is inserted into your bladder to drain urine. It is also called an indwelling urinary catheter. The tip of the catheter has a small balloon filled with solution that holds the catheter inside your bladder.              DISCHARGE INSTRUCTIONS:    Seek care immediately if:     Your catheter comes out.       You suddenly have material that looks like sand in the tubing or drainage bag.      No urine is draining into the bag and you have checked the system.      You have pain in your hip, back, pelvis, or lower abdomen.      You are confused or cannot think clearly.    Call your doctor or urologist if:     You have a fever.      You have bladder spasms for more than 1 day after the catheter is placed.      You see blood in the tubing or drainage bag.      You have a rash or itching where the catheter tube is secured to your skin.      Urine leaks from or around the catheter, tubing, or drainage bag.      The closed drainage system has accidently come open or apart.       You see a layer of crystals inside the tubing.      You have questions or concerns about your condition or care.    Care for your Talamantes catheter:     Clean your genital area 2 times every day. Clean your catheter and the area around where it was inserted. Use soap and water. Clean your anal opening and catheter area after every bowel movement.       Secure the catheter tube so you do not pull or move the catheter. This helps prevent pain and bladder spasms. Healthcare providers will show you how to use medical tape or a strap to secure the catheter tube to your body.       Keep a closed drainage system. Your Talamantes catheter should always be attached to the drainage bag to form a closed system. Do not disconnect any part of the closed system unless you need to change the bag.    Care for your drainage bag:     Ask if a leg bag is right for you. A leg bag can be worn under your clothes. Ask your healthcare provider for more information about a leg bag.       Keep the drainage bag below the level of your waist. This helps stop urine from moving back up the tubing and into your bladder. Do not loop or kink the tubing. This can cause urine to back up and collect in your bladder. Do not let the drainage bag touch or lie on the floor.      Empty the drainage bag when needed. The weight of a full drainage bag can be painful. Empty the drainage bag every 3 to 6 hours or when it is ? full.       Clean and change the drainage bag as directed. Ask your healthcare provider how often you should change the drainage bag and what cleaning solution to use. Wear disposable gloves when you change the bag. Do not allow the end of the catheter or tubing to touch anything. Clean the ends with an alcohol pad before you reconnect them.    What to do if problems develop:     No urine is draining into the bag:   Check for kinks in the tubing and straighten them out.       Check the tape or strap used to secure the catheter tube to your skin. Make sure it is not blocking the tube.       Make sure you are not sitting or lying on the tubing.      Make sure the urine bag is hanging below the level of your waist.      Urine leaks from or around the catheter, tubing, or drainage bag: Check if the closed drainage system has accidently come open or apart. Clean the catheter and tubing ends with a new alcohol pad and reconnect them.     Prevent an infection:     Wash your hands often. Wash before and after you touch your catheter, tubing, or drainage bag. Use soap and water. Wear clean disposable gloves when you care for your catheter or disconnect the drainage bag. Wash your hands before you prepare or eat food. Handwashing      Drink liquids as directed. Ask your healthcare provider how much liquid to drink each day and which liquids are best for you. Liquids will help flush your kidneys and bladder to help prevent infection.    Follow up with your doctor or urologist as directed: Write down your questions so you remember to ask them during your visits.

## 2019-07-30 NOTE — ED ADULT NURSE NOTE - ED STAT RN HANDOFF DETAILS
Patient is discharged with leg bag in place, ambulatory. Patient states that he is awaiting for daughter to .

## 2019-07-30 NOTE — ED PROVIDER NOTE - CARE PROVIDER_API CALL
Scar Butterfield)  [facility]  [address] [place] [address], [address]  [place] [address]  Phone: [phone]  Fax: [phone]  Follow Up Time: 4-6 Days

## 2019-07-30 NOTE — ASU DISCHARGE PLAN (ADULT/PEDIATRIC) - CARE PROVIDER_API CALL
Scar Butterfield)  [facility]  [address] [place] [address], [address]  [place] [address]  Phone: [phone]  Fax: [phone]  Follow Up Time:

## 2019-07-30 NOTE — ED PROVIDER NOTE - OBJECTIVE STATEMENT
75 yo M, history of HTN, HLD, DM II, previous prostate ca sp radiation, now with recurrent hematuria, found to have bladder mass which was resected via TURBT yesterday.     Patient presents today with blood in palomo bag and leaking around palomo at meatus -- spoke with Dr. Butterfield who told him to come to the ED for assessment of possible clotted palomo.     The palomo is draining normally into leg bag, no large clots.     He has no nausea, vomiting, fever, chills.     Is currently on bactrim, flomax and pyridium.

## 2019-07-30 NOTE — ASU PATIENT PROFILE, ADULT - PMH
Anemia    CAD, multiple vessel    Diabetes mellitus  type 2  Hypertension    Prostate CA  with radiation  PVD (peripheral vascular disease)

## 2019-07-30 NOTE — ASU PREOP CHECKLIST - PATIENT'S PERSONAL PROPERTY GIVEN TO
Patient states his symptoms are the same as when he first saw DR Felipe. He is having stomach pains, nausea, diarrhea, sweats and chills.      LOCKER # 4

## 2019-07-30 NOTE — ED PROVIDER NOTE - NS ED ROS FT
Constitutional: no fever, chills, no recent weight loss  Cardiac: No chest pain, SOB or edema.  Respiratory: No cough or respiratory distress  GI: see HPI  : see HPI  MS: no pain to back or extremities, no loss of ROM, no weakness  Neuro: No headache or weakness. No LOC.  Skin: No skin rash.  Except as documented in the HPI, all other systems are negative.

## 2019-07-30 NOTE — ED ADULT NURSE NOTE - OBJECTIVE STATEMENT
Patient presents with complaints of urinary leakage from the palomo catheter 10FR, discomfort around genitalia,  and clots noticed in palomo. Patient is alert and oriented x 4, respirations are even and unlabored, S1, S2 regular , abdomen is soft and nontender, moderate amount of urine with no clots noted, clear red, palomo irrigated per MD at bedside, draining well, leg bag changed in ED, ultrasound performed by attending MD at bedside. Patient is in stable condition.

## 2019-07-30 NOTE — ASU PATIENT PROFILE, ADULT - AS SC BRADEN MOISTURE
Musculoskeletal Pain    Musculoskeletal pain is muscle and bone aches and pains. This pain can occur in any part of the body.    Follow these instructions at home:  Only take medicines for pain, discomfort, or fever as told by your health care provider.  You may continue all activities unless the activities cause more pain. When the pain lessens, slowly resume normal activities. Gradually increase the intensity and duration of the activities or exercise.  During periods of severe pain, bed rest may be helpful. Lie or sit in any position that is comfortable, but get out of bed and walk around at least every several hours.  ImageIf directed, put ice on the injured area.    Put ice in a plastic bag.  Place a towel between your skin and the bag.  Leave the ice on for 20 minutes, 2–3 times a day.    Contact a health care provider if:  Your pain is getting worse.  Your pain is not relieved with medicines.  You lose function in the area of the pain if the pain is in your arms, legs, or neck.  This information is not intended to replace advice given to you by your health care provider. Make sure you discuss any questions you have with your health care provider.
(4) rarely moist

## 2019-07-30 NOTE — ED ADULT TRIAGE NOTE - CHIEF COMPLAINT QUOTE
Patient states he has a palomo catheter and has a blood clot in the tube. Patient states the urine is coming outside the tube.

## 2019-07-30 NOTE — ED PROVIDER NOTE - CLINICAL SUMMARY MEDICAL DECISION MAKING FREE TEXT BOX
Palomo catheter flushed, bloody urine now riley colored. US done after flushing and the bladder is completely collapsed around palomo balloon.       Patient observed, no further leakage, good drainage into leg bag.

## 2019-07-31 ENCOUNTER — APPOINTMENT (OUTPATIENT)
Dept: UROLOGY | Facility: CLINIC | Age: 74
End: 2019-07-31
Payer: MEDICARE

## 2019-07-31 PROCEDURE — 99213 OFFICE O/P EST LOW 20 MIN: CPT

## 2019-07-31 NOTE — ASSESSMENT
[FreeTextEntry1] : This is a 74 year male who presents for evaluation. Was in the ER and had palomo placed for urinary retention. has been told that he has a large prostate one year ago -- urologist in [place] (Johnie) treated with radiation (2 years ago)-- has not brought in medication records\par . \par Currently on both finasteride, flomax, myrbetriq and oxybutyinin was recently completed. \par \par I took him for cystolitholopaxy a few weeks ago\par specimen showed high grade urothelial cancer\par \par US Prostate, 2.8, x4, x4cm. about 25g\par \par  has been voiding much better since the procedure\par but has had bleeding for the last year and wishes for us to resolve this as well\par \par Patient still to bring me notes from [place] urologist detailing status of prostate cancer\par at the next visit. \par \par PSA in the future when healed \par cysto today could not be performed due to hematuria and floating debris i the bladder not allowing visualization

## 2019-07-31 NOTE — HISTORY OF PRESENT ILLNESS
[FreeTextEntry1] : This is a 74 year male who had turbt for small bladder base tumor. went to er over the night for bladder pain, possible clotting of palomo\par was found to be draining well in the ER after flushing\par currently feeling better\par no fevers or chills\par urine is now draining clear

## 2019-07-31 NOTE — PHYSICAL EXAM
[General Appearance - Well Nourished] : well nourished [General Appearance - Well Developed] : well developed [Well Groomed] : well groomed [Normal Appearance] : normal appearance [General Appearance - In No Acute Distress] : no acute distress [Abdomen Soft] : soft [Costovertebral Angle Tenderness] : no ~M costovertebral angle tenderness [Abdomen Tenderness] : non-tender [Skin Color & Pigmentation] : normal skin color and pigmentation [FreeTextEntry1] : paloom in place, clear yellow urine in bag [Edema] : no peripheral edema [] : no respiratory distress [Respiration, Rhythm And Depth] : normal respiratory rhythm and effort [Oriented To Time, Place, And Person] : oriented to person, place, and time [Exaggerated Use Of Accessory Muscles For Inspiration] : no accessory muscle use [Mood] : the mood was normal [Affect] : the affect was normal [Not Anxious] : not anxious [Normal Station and Gait] : the gait and station were normal for the patient's age [No Focal Deficits] : no focal deficits

## 2019-07-31 NOTE — HISTORY OF PRESENT ILLNESS
[FreeTextEntry1] : This is a 74 year male who presents for evaluation. Was in the ER and had palomo placed for urinary retention. has been told that he has a large prostate one year ago -- urologist in [place] (Johnie) treated with radiation (2 years ago)-- has not brought in medication records\par . \par Currently on both finasteride, flomax, myrbetriq and oxybutyinin was recently completed. \par \par I took him for cystolitholopaxy a few weeks ago\par specimen showed high grade urothelial cancer\par \par US Prostate, 2.8, x4, x4cm. about 25g\par \par  has been voiding much better since the procedure\par but has had bleeding for the last year and wishes for us to resolve this as well\par \par Patient still to bring me notes from [place] urologist detailing status of prostate cancer\par at the next visit. \par \par PSA in the future when healed \par cysto today could not be performed due to hematuria and floating debris i the bladder not allowing visualization\par

## 2019-08-02 ENCOUNTER — APPOINTMENT (OUTPATIENT)
Dept: UROLOGY | Facility: CLINIC | Age: 74
End: 2019-08-02
Payer: MEDICARE

## 2019-08-02 VITALS
DIASTOLIC BLOOD PRESSURE: 72 MMHG | SYSTOLIC BLOOD PRESSURE: 132 MMHG | HEART RATE: 83 BPM | BODY MASS INDEX: 28.42 KG/M2 | WEIGHT: 203 LBS | HEIGHT: 71 IN

## 2019-08-02 PROCEDURE — 99214 OFFICE O/P EST MOD 30 MIN: CPT

## 2019-08-02 NOTE — PHYSICAL EXAM
[General Appearance - Well Nourished] : well nourished [General Appearance - Well Developed] : well developed [Normal Appearance] : normal appearance [Well Groomed] : well groomed [General Appearance - In No Acute Distress] : no acute distress [Abdomen Tenderness] : non-tender [Costovertebral Angle Tenderness] : no ~M costovertebral angle tenderness [Abdomen Soft] : soft [Skin Color & Pigmentation] : normal skin color and pigmentation [Edema] : no peripheral edema [] : no respiratory distress [Respiration, Rhythm And Depth] : normal respiratory rhythm and effort [Exaggerated Use Of Accessory Muscles For Inspiration] : no accessory muscle use [Oriented To Time, Place, And Person] : oriented to person, place, and time [Affect] : the affect was normal [Mood] : the mood was normal [Not Anxious] : not anxious [Normal Station and Gait] : the gait and station were normal for the patient's age [No Focal Deficits] : no focal deficits [FreeTextEntry1] : palomo in place, clear yellow urine in bag

## 2019-08-02 NOTE — ASSESSMENT
[FreeTextEntry1] : This is a 74 year male who had turbt for small bladder base tumor.\par currently feeling better\par no fevers or chills\par urine is now draining clear. \par \par outside notes from Dr garcia show:\par Nov 2013-- prostate biopsy -- adenocarcinoma in multiple cores. -- up to 4 + 4 = 8  on the lateral mid and lateral base of the right\par Had negative Fish in Nov 2018, as well as negative cytology\par  \par will bring CT scan that was done by outside md for me to review

## 2019-08-07 LAB — SURGICAL PATHOLOGY STUDY: SIGNIFICANT CHANGE UP

## 2019-08-09 ENCOUNTER — APPOINTMENT (OUTPATIENT)
Dept: UROLOGY | Facility: CLINIC | Age: 74
End: 2019-08-09
Payer: MEDICARE

## 2019-08-09 VITALS
HEART RATE: 73 BPM | WEIGHT: 203 LBS | DIASTOLIC BLOOD PRESSURE: 70 MMHG | HEIGHT: 71 IN | SYSTOLIC BLOOD PRESSURE: 127 MMHG | BODY MASS INDEX: 28.42 KG/M2

## 2019-08-09 DIAGNOSIS — N21.0 CALCULUS IN BLADDER: ICD-10-CM

## 2019-08-09 DIAGNOSIS — R31.0 GROSS HEMATURIA: ICD-10-CM

## 2019-08-09 PROCEDURE — 99214 OFFICE O/P EST MOD 30 MIN: CPT

## 2019-08-09 NOTE — ASSESSMENT
[FreeTextEntry1] : This is a 74 year male who had turbt for small bladder base tumor. pathology shows high grade cancer with extensive invasion into the deep smooth muscle/muscularis propia. focal squamous metaplasia, glandular differentiation\par currently feeling better\par no fevers or chills\par urine is now draining clear. \par \par outside notes from Dr garcia show:\par Nov 2013-- prostate biopsy -- adenocarcinoma in multiple cores. -- up to 4 + 4 = 8 on the lateral mid and lateral base of the right\par Had negative Fish in Nov 2018, as well as negative cytology\par

## 2019-08-09 NOTE — PHYSICAL EXAM
[General Appearance - Well Developed] : well developed [General Appearance - Well Nourished] : well nourished [Normal Appearance] : normal appearance [Well Groomed] : well groomed [General Appearance - In No Acute Distress] : no acute distress [Abdomen Soft] : soft [Abdomen Tenderness] : non-tender [Costovertebral Angle Tenderness] : no ~M costovertebral angle tenderness [FreeTextEntry1] : palomo in place, clear yellow urine in bag [Skin Color & Pigmentation] : normal skin color and pigmentation [Edema] : no peripheral edema [] : no respiratory distress [Respiration, Rhythm And Depth] : normal respiratory rhythm and effort [Exaggerated Use Of Accessory Muscles For Inspiration] : no accessory muscle use [Oriented To Time, Place, And Person] : oriented to person, place, and time [Affect] : the affect was normal [Mood] : the mood was normal [Normal Station and Gait] : the gait and station were normal for the patient's age [Not Anxious] : not anxious [No Focal Deficits] : no focal deficits

## 2019-08-09 NOTE — HISTORY OF PRESENT ILLNESS
[FreeTextEntry1] : This is a 74 year male who had turbt for small bladder base tumor. pathology shows high grade cancer with extensive invasion into the deep smooth muscle/muscularis propia. focal squamous metaplasia, glandular differentiation\par currently feeling better\par no fevers or chills\par urine is now draining clear. \par \par outside notes from Dr garcia show:\par Nov 2013-- prostate biopsy -- adenocarcinoma in multiple cores. -- up to 4 + 4 = 8 on the lateral mid and lateral base of the right\par Had negative Fish in Nov 2018, as well as negative cytology\par  \par will bring CT scan that was done by outside md for me to review.

## 2019-08-12 DIAGNOSIS — I10 ESSENTIAL (PRIMARY) HYPERTENSION: ICD-10-CM

## 2019-08-12 DIAGNOSIS — I25.10 ATHEROSCLEROTIC HEART DISEASE OF NATIVE CORONARY ARTERY WITHOUT ANGINA PECTORIS: ICD-10-CM

## 2019-08-12 DIAGNOSIS — Z79.02 LONG TERM (CURRENT) USE OF ANTITHROMBOTICS/ANTIPLATELETS: ICD-10-CM

## 2019-08-12 DIAGNOSIS — C67.9 MALIGNANT NEOPLASM OF BLADDER, UNSPECIFIED: ICD-10-CM

## 2019-08-12 DIAGNOSIS — E11.9 TYPE 2 DIABETES MELLITUS WITHOUT COMPLICATIONS: ICD-10-CM

## 2019-08-12 DIAGNOSIS — D49.4 NEOPLASM OF UNSPECIFIED BEHAVIOR OF BLADDER: ICD-10-CM

## 2019-08-12 DIAGNOSIS — Z79.84 LONG TERM (CURRENT) USE OF ORAL HYPOGLYCEMIC DRUGS: ICD-10-CM

## 2019-08-27 ENCOUNTER — OUTPATIENT (OUTPATIENT)
Dept: OUTPATIENT SERVICES | Facility: HOSPITAL | Age: 74
LOS: 1 days | Discharge: HOME | End: 2019-08-27

## 2019-08-27 DIAGNOSIS — R59.0 LOCALIZED ENLARGED LYMPH NODES: ICD-10-CM

## 2019-08-27 DIAGNOSIS — Z95.5 PRESENCE OF CORONARY ANGIOPLASTY IMPLANT AND GRAFT: Chronic | ICD-10-CM

## 2019-08-27 DIAGNOSIS — Z98.890 OTHER SPECIFIED POSTPROCEDURAL STATES: Chronic | ICD-10-CM

## 2019-08-27 DIAGNOSIS — E11.9 TYPE 2 DIABETES MELLITUS WITHOUT COMPLICATIONS: ICD-10-CM

## 2019-09-18 ENCOUNTER — APPOINTMENT (OUTPATIENT)
Dept: UROLOGY | Facility: CLINIC | Age: 74
End: 2019-09-18

## 2019-10-09 ENCOUNTER — APPOINTMENT (OUTPATIENT)
Dept: UROLOGY | Facility: CLINIC | Age: 74
End: 2019-10-09
Payer: MEDICARE

## 2019-10-09 PROCEDURE — 99214 OFFICE O/P EST MOD 30 MIN: CPT

## 2019-10-09 RX ORDER — CIPROFLOXACIN HYDROCHLORIDE 500 MG/1
500 TABLET, FILM COATED ORAL
Qty: 2 | Refills: 0 | Status: DISCONTINUED | COMMUNITY
Start: 2019-08-09 | End: 2019-10-09

## 2019-10-09 RX ORDER — NITROFURANTOIN (MONOHYDRATE/MACROCRYSTALS) 25; 75 MG/1; MG/1
100 CAPSULE ORAL TWICE DAILY
Qty: 14 | Refills: 0 | Status: DISCONTINUED | COMMUNITY
Start: 2019-08-14 | End: 2019-10-09

## 2019-10-09 NOTE — PHYSICAL EXAM
[General Appearance - Well Developed] : well developed [General Appearance - Well Nourished] : well nourished [Normal Appearance] : normal appearance [Well Groomed] : well groomed [General Appearance - In No Acute Distress] : no acute distress [Abdomen Soft] : soft [Abdomen Tenderness] : non-tender [Costovertebral Angle Tenderness] : no ~M costovertebral angle tenderness [FreeTextEntry1] : palomo in place, clear yellow urine in bag [Skin Color & Pigmentation] : normal skin color and pigmentation [Edema] : no peripheral edema [] : no respiratory distress [Respiration, Rhythm And Depth] : normal respiratory rhythm and effort [Exaggerated Use Of Accessory Muscles For Inspiration] : no accessory muscle use [Oriented To Time, Place, And Person] : oriented to person, place, and time [Affect] : the affect was normal [Mood] : the mood was normal [Not Anxious] : not anxious [Normal Station and Gait] : the gait and station were normal for the patient's age [No Focal Deficits] : no focal deficits

## 2019-10-09 NOTE — ASSESSMENT
[FreeTextEntry1] : This is a 74 year male who had turbt for small bladder base tumor. pathology shows high grade cancer with extensive invasion into the deep smooth muscle/muscularis propia. focal squamous metaplasia, glandular differentiation\par currently feeling better\par has been following with oncology at [facility]\par no fevers or chills\par urine is now back to be bloody-- not in retention\par was told no radiation, or chemo\par is getting hormonal treatment\par \par outside notes from Dr garcia show:\par Nov 2013-- prostate biopsy -- adenocarcinoma in multiple cores. -- up to 4 + 4 = 8 on the lateral mid and lateral base of the right\par Had negative Fish in Nov 2018, as well as negative cytology\par \par Aug 2019\par PSA Profile - Total = <0.1\par Urinalysis w/Reflex; Status:Active; Requested for:[id number]; \par \par plan for prostate biopsy on August 19th -- then will refer to DR Wade\par aware of risk of bleeding and sepsis

## 2019-10-09 NOTE — HISTORY OF PRESENT ILLNESS
[FreeTextEntry1] : This is a 74 year male who had turbt for small bladder base tumor. pathology shows high grade cancer with extensive invasion into the deep smooth muscle/muscularis propia. focal squamous metaplasia, glandular differentiation\par currently feeling better\par [place] [facility] [place] no fevers or chills\par urine is now back to be bloody-- not in retention\par was told no radiation, or chemo\par is getting hormonal treatment\par \par outside notes from Dr garcia show:\par Nov 2013-- prostate biopsy -- adenocarcinoma in multiple cores. -- up to 4 + 4 = 8 on the lateral mid and lateral base of the right\par Had negative Fish in Nov 2018, as well as negative cytology\par \par Aug 2019\par PSA Profile - Total = <0.1\par Urinalysis w/Reflex; Status:Active; Requested for:[id number]; \par \par plan for prostate biopsy on August 19th -- then will refer to DR Wade\par aware of risk of bleeding and sepsis

## 2019-10-14 ENCOUNTER — EMERGENCY (EMERGENCY)
Facility: HOSPITAL | Age: 74
LOS: 0 days | Discharge: HOME | End: 2019-10-14
Attending: EMERGENCY MEDICINE | Admitting: INTERNAL MEDICINE
Payer: MEDICARE

## 2019-10-14 VITALS
SYSTOLIC BLOOD PRESSURE: 147 MMHG | TEMPERATURE: 96 F | OXYGEN SATURATION: 98 % | DIASTOLIC BLOOD PRESSURE: 85 MMHG | RESPIRATION RATE: 18 BRPM | HEART RATE: 113 BPM

## 2019-10-14 VITALS
HEART RATE: 98 BPM | TEMPERATURE: 97 F | DIASTOLIC BLOOD PRESSURE: 61 MMHG | RESPIRATION RATE: 14 BRPM | OXYGEN SATURATION: 96 % | SYSTOLIC BLOOD PRESSURE: 115 MMHG

## 2019-10-14 DIAGNOSIS — Z98.890 OTHER SPECIFIED POSTPROCEDURAL STATES: Chronic | ICD-10-CM

## 2019-10-14 DIAGNOSIS — Z95.5 PRESENCE OF CORONARY ANGIOPLASTY IMPLANT AND GRAFT: Chronic | ICD-10-CM

## 2019-10-14 PROCEDURE — 99283 EMERGENCY DEPT VISIT LOW MDM: CPT | Mod: GC

## 2019-10-14 NOTE — ED PROVIDER NOTE - OBJECTIVE STATEMENT
73 yo M with hx of HTN, HLD, DM II, previous prostate CA s/p radiation, with bladder mass followed with Dr. Butterfield, presents for urinary rentention, onset 12 hours ago, with no associated symptoms. NO fever, no chills, no headache, no nausea, no vomiting, no chest pain, no back pain, no abdominal pain. Pt states that he would like a palomo to be placed in.

## 2019-10-14 NOTE — ED PROVIDER NOTE - PMH
Anemia    Bladder cancer    CAD, multiple vessel    Diabetes mellitus  type 2  Hypertension    Prostate CA  with radiation  PVD (peripheral vascular disease)

## 2019-10-14 NOTE — ED ADULT NURSE NOTE - OBJECTIVE STATEMENT
Pt reports urinary retention since today. Talamantes catheter place and pt reports relief of lower abd pain. Denies chest pain, dizziness, nausea, vomiting, and fever. Bloody urine noted.

## 2019-10-14 NOTE — ED PROVIDER NOTE - PROGRESS NOTE DETAILS
Talamantes placed, flushed, clearing. HR 98. Reassessed pt, dark red urine, discussed urology consult, pt  declines states he would like to go home and follow up outpatient Discussed care of stacia. Discussed need to follow up with Dr. Butterfield. Discussed signs and symptoms to return to the ED for.

## 2019-10-14 NOTE — ED ADULT TRIAGE NOTE - NS ED NOTE AC HIGH RISK COUNTRIES
[place] eMERGENCY dEPARTMENT encounter:    [facility] EM[facility]  [address]  [place] [address]  [phone]    CHIEF COMPLAINT:    Chief Complaint   Patient presents with   • Toe Pain       HPI:    This is a 2 month old male who presented to the ED with the history of possible hair tourniquet on left 3rd toe.  Mom noticed a red 3rd toe at 18:30 tonight.  She tried to scratch at the toe and remove a possible hair tourniquet.  It is now more red and the crease is still there.  She did not see a definite hair.  No known injury except possible inadvertent injury from Mom trying to remove it.     ALLERGIES:    ALLERGIES:  No Known Allergies    CURRENT MEDICATIONS:    No current facility-administered medications for this encounter.      Current Outpatient Prescriptions   Medication Sig Dispense Refill   • pediatric multivitamin with iron (POLY-VI-SOL WITH IRON) 10 MG/ML Solution Take 0.5 mLs by mouth every 24 hours. 50 mL 3       PAST MEDICAL HISTORY:    History reviewed. No pertinent past medical history.    SURGICAL HISTORY:    History reviewed. No pertinent surgical history.    SOCIAL HISTORY:    Social History     Social History   • Marital status: Single     Spouse name: N/A   • Number of children: N/A   • Years of education: N/A     Social History Main Topics   • Smoking status: Never Smoker   • Smokeless tobacco: Never Used   • Alcohol use None   • Drug use: Unknown   • Sexual activity: Not Asked     Other Topics Concern   • None     Social History Narrative   • None       FAMILY HISTORY:    History reviewed. No pertinent family history.    REVIEW OF SYSTEMS:        PHYSICAL EXAM:   ED Triage Vitals   BP    Pulse    Resp    Temp    SpO2        Constitutional: Well-developed and well-nourished.   HENT:   Head: Normocephalic and atraumatic.   Right Ear: External ear normal.   Left Ear: External ear normal.   Eyes: Conjunctivae normal and EOM are normal. Pupils are equal,  round, and reactive to light. Right eye exhibits no discharge. Left eye exhibits no discharge.   Neck: Normal range of motion. Neck supple.   Pulmonary/Chest: Effort normal. No respiratory distress.   Abdominal: Non-distended.   Musculoskeletal: No edema and no tenderness.   Neurological:  No cranial nerve deficit.   Skin: Skin is warm and dry. No rash noted.         Psychiatric: Normal mood and affect. Behavior is normal.      RADIOLOGY AND LAB RESULTS:  No results found for this visit on 09/21/18.  No orders to display       ED COURSE & MEDICAL DECISION MAKING:   No hair tourniquet seen, I used magnifying lenses from the ENT cart.  I do see a small avulsion of skin on the lateral 3rd toe, as above.  Antibiotic ointment is recommended.     The toe still has a crease that is circumferential, unlike his other toes.  I feel he likely had a hair tourniquet, which was removed or fell off.  No sign of infection at this time.       DIAGNOSIS:  1. Hair tourniquet of toe of left foot, initial encounter        PLAN:  Prescriptions:  New Prescriptions    No medications on file   .               Jay Britt MD  09/21/18 [id number]       Jay Britt MD  09/21/18 [id number]     No

## 2019-10-14 NOTE — ED PROVIDER NOTE - PATIENT PORTAL LINK FT
You can access the FollowMyHealth Patient Portal offered by [facility] by registering at the following website: [email or web address]. By joining [employer]’s FollowMyHealth portal, you will also be able to view your health information using other applications (apps) compatible with our system.

## 2019-10-14 NOTE — ED PROVIDER NOTE - ATTENDING CONTRIBUTION TO CARE
74 y M PMH HTN, HLD, DM, CAD s/p stenting, previous prostate CA s/p radiation, with persistent bladder mass followed with Dr. Butterfield and pending surgical management, he states "waiting for insurance," presents for urinary rentention, last urinated 12 hours PTA. States he always urinates blood at the beginning of the stream but it clears by the end, today was the same on the last urination this morning. Now passing clots, no urine. No fever, chills, nausea, vomiting, flank pain, cough, SOB, dizziness, lightheadedness, syncope, or other concern.   Exam: NAD, NCAT, HEENT: mmm, EOMI, PERRLA, Neck: supple, nontender, nl ROM, Heart: tachycardic RR, no murmur, Lungs: BCTA, no signs of increased WOB, Abd: NTND, no guarding or rebound, no hernia palpated, no CVAT. : Normal uncircumcised external genitalia, urethra with clots at meatus. normal exam otherwise. MSK: chest, back, and ext nontender, nl rom, no deformity. Neuro: A&Ox3, normal strength, nl sensation throughout, normal speech and gait.  A/P: Palomo Catheter placed with passage of few small clots, 200cc of bloody urine, Flushed with 500cc NS with clearing of urine and no further clot passage. Tachycardia resolved once palomo placed. Observe for recurrence and further flushing.

## 2019-10-14 NOTE — ED PROVIDER NOTE - NSFOLLOWUPINSTRUCTIONS_ED_ALL_ED_FT
Acute Urinary Retention, Male  Image   Acute urinary retention means that you cannot pee (urinate) at all, or that you pee too little and your bladder is not emptied completely. If it is not treated, it can lead to kidney damage or other serious problems.  Follow these instructions at home:  Take over-the-counter and prescription medicines only as told by your doctor. Ask your doctor what medicines you should stay away from. Do not take any medicine unless your doctor says it is okay to do so.If you were sent home with a tube that drains the bladder (catheter), take care of it as told by your doctor.Drink enough fluid to keep your pee clear or pale yellow.If you were given an antibiotic, take it as told by your doctor. Do not stop taking the antibiotic even if you start to feel better.Do not use any products that contain nicotine or tobacco, such as cigarettes and e-cigarettes. If you need help quitting, ask your doctor.Watch for changes in your symptoms. Tell your doctor about them.If told, track changes in your blood pressure at home. Tell your doctor about them.Keep all follow-up visits as told by your doctor. This is important.Contact a doctor if:  You have spasms or you leak pee when you have spasms.Get help right away if:  You have chills or a fever.You have a tube that drains the bladder and:  The tube stops draining pee.The tube falls out.You have blood in your pee.Summary  Acute urinary retention means that you cannot pee at all, or that you pee too little and your bladder is not emptied completely. If it is not treated, it can result in kidney damage or other serious problems.If you were sent home with a tube that drains the bladder, take care of it as told by your doctor.Monitor any changes in your symptoms. Tell your doctor about any changes.This information is not intended to replace advice given to you by your health care provider. Make sure you discuss any questions you have with your health care provider.    Indwelling Urinary Catheter Care, Adult  An indwelling urinary catheter is a thin tube that is put into your bladder. The tube helps to drain pee (urine) out of your body. The tube goes in through your urethra. Your urethra is where pee comes out of your body. Your pee will come out through the catheter, then it will go into a bag (drainage bag).  Take good care of your catheter so it will work well.  How to wear your catheter and bag  Supplies needed     Sticky tape (adhesive tape) or a leg strap.Alcohol wipe or soap and water (if you use tape).A clean towel (if you use tape).Large overnight bag.Smaller bag (leg bag).Wearing your catheter     Attach your catheter to your leg with tape or a leg strap.  Make sure the catheter is not pulled tight.If a leg strap gets wet, take it off and put on a dry strap.If you use tape to hold the bag on your leg:  Use an alcohol wipe or soap and water to wash your skin where the tape made it sticky before.Use a clean towel to pat-dry that skin.Use new tape to make the bag stay on your leg.Wearing your bags    You should have been given a large overnight bag.  You may wear the overnight bag in the day or night.Always have the overnight bag lower than your bladder. Do not let the bag touch the floor.Before you go to sleep, put a clean plastic bag in a wastebasket. Then hang the overnight bag inside the wastebasket.You should also have a smaller leg bag that fits under your clothes.  Always wear the leg bag below your knee.Do not wear your leg bag at night.How to care for your skin and catheter  Supplies needed     A clean washcloth.Water and mild soap.A clean towel.Caring for your skin and catheter     Image       Image   Clean the skin around your catheter every day:  Wash your hands with soap and water.Wet a clean washcloth in warm water and mild soap.Clean the skin around your urethra.  If you are female:  Gently spread the folds of skin around your vagina (labia).With the washcloth in your other hand, wipe the inner side of your labia on each side. Wipe from front to back.If you are male:  Pull back any skin that covers the end of your penis (foreskin).With the washcloth in your other hand, wipe your penis in small circles. Start wiping at the tip of your penis, then move away from the catheter.With your free hand, hold the catheter close to where it goes into your body.  Keep holding the catheter during cleaning so it does not get pulled out.With the washcloth in your other hand, clean the catheter.  Only wipe downward on the catheter.Do not wipe upward toward your body. Doing this may push germs into your urethra and cause infection.Use a clean towel to pat-dry the catheter and the skin around it. Make sure to wipe off all soap.Wash your hands with soap and water.Shower every day. Do not take baths.Do not use cream, ointment, or lotion on the area where the catheter goes into your body, unless your doctor tells you to.Do not use powders, sprays, or lotions on your genital area.Check your skin around the catheter every day for signs of infection. Check for:  Redness, swelling, or pain.Fluid or blood.Warmth.Pus or a bad smell.How to empty the bag  Supplies needed     Rubbing alcohol.Gauze pad or cotton ball.Tape or a leg strap.Emptying the bag     Pour the pee out of your bag when it is ?–½ full, or at least 2–3 times a day. Do this for your overnight bag and your leg bag.  Wash your hands with soap and water.  Separate (detach) the bag from your leg.  Hold the bag over the toilet or a clean pail. Keep the bag lower than your hips and bladder. This is so the pee (urine) does not go back into the tube.  Open the pour spout. It is at the bottom of the bag.  Empty the pee into the toilet or pail. Do not let the pour spout touch any surface.  Put rubbing alcohol on a gauze pad or cotton ball.  Use the gauze pad or cotton ball to clean the pour spout.  Close the pour spout.  Attach the bag to your leg with tape or a leg strap.  Wash your hands with soap and water.  Follow instructions for cleaning the drainage bag:  From the product maker.As told by your doctor.How to change the bag  Supplies needed     Alcohol wipes.A clean bag.Tape or a leg strap.Changing the bag     Replace your bag with a clean bag once a month. If it starts to leak, smell bad, or look dirty, change it sooner.  Wash your hands with soap and water.  Separate the dirty bag from your leg.  Pinch the catheter with your fingers so that pee does not spill out.  Separate the catheter tube from the bag tube where these tubes connect (at the connection valve). Do not let the tubes touch any surface.  Clean the end of the catheter tube with an alcohol wipe. Use a different alcohol wipe to clean the end of the bag tube.  Connect the catheter tube to the tube of the clean bag.  Attach the clean bag to your leg with tape or a leg strap. Do not make the bag tight on your leg.  Wash your hands with soap and water.  General rules  Image   Never pull on your catheter. Never try to take it out. Doing that can hurt you.Always wash your hands before and after you touch your catheter or bag. Use a mild, fragrance-free soap. If you do not have soap and water, use hand .Always make sure there are no twists or bends (kinks) in the catheter tube.Always make sure there are no leaks in the catheter or bag.Drink enough fluid to keep your pee pale yellow.Do not take baths, swim, or use a hot tub.If you are female, wipe from front to back after you poop (have a bowel movement).Contact a doctor if:  Your pee is cloudy.Your pee smells worse than usual.Your catheter gets clogged.Your catheter leaks.Your bladder feels full.Get help right away if:  You have redness, swelling, or pain where the catheter goes into your body.You have fluid, blood, pus, or a bad smell coming from the area where the catheter goes into your body.Your skin feels warm where the catheter goes into your body.You have a fever.You have pain in your:  Belly (abdomen).Legs.Lower back.Bladder.You see blood in the catheter.Your pee is pink or red.You feel sick to your stomach (nauseous).You throw up (vomit).You have chills.Your pee is not draining into the bag.Your catheter gets pulled out.Summary  An indwelling urinary catheter is a thin tube that is placed into the bladder to help drain pee (urine) out of the body. The catheter is placed into the part of the body that drains pee from the bladder (urethra).Taking good care of your catheter will keep it working properly and help prevent problems.Always wash your hands before and after touching your catheter or bag.Never pull on your catheter or try to take it out.This information is not intended to replace advice given to you by your health care provider. Make sure you discuss any questions you have with your health care provider.

## 2019-10-14 NOTE — ED PROVIDER NOTE - CARE PROVIDER_API CALL
Scar Butterfield)  [facility]  [address] [place] [address], [address]  [place] [address]  Phone: [phone]  Fax: [phone]  Follow Up Time: 4-6 Days

## 2019-10-14 NOTE — ED PROVIDER NOTE - CLINICAL SUMMARY MEDICAL DECISION MAKING FREE TEXT BOX
pw urinary retention 2/2 clots from ongoing bleeding, likely from bladder mass. Talamantes placed, urine cleared with flushing, Refused urology consultation. Wants to go home and follow up. Patient to be discharged from ED. Any available test results were discussed with patient. Verbal instructions given, including instructions to return to ED immediately for any new, worsening, or concerning symptoms. Patient endorsed understanding. Written discharge instructions additionally given, including follow-up plan with Urology.

## 2019-10-15 ENCOUNTER — INPATIENT (INPATIENT)
Facility: HOSPITAL | Age: 74
LOS: 2 days | Discharge: HOME | End: 2019-10-18
Attending: HOSPITALIST | Admitting: HOSPITALIST
Payer: MEDICARE

## 2019-10-15 VITALS
OXYGEN SATURATION: 99 % | DIASTOLIC BLOOD PRESSURE: 72 MMHG | TEMPERATURE: 96 F | HEART RATE: 85 BPM | RESPIRATION RATE: 18 BRPM | SYSTOLIC BLOOD PRESSURE: 137 MMHG

## 2019-10-15 DIAGNOSIS — Z98.890 OTHER SPECIFIED POSTPROCEDURAL STATES: Chronic | ICD-10-CM

## 2019-10-15 DIAGNOSIS — Z95.5 PRESENCE OF CORONARY ANGIOPLASTY IMPLANT AND GRAFT: Chronic | ICD-10-CM

## 2019-10-15 LAB
ANION GAP SERPL CALC-SCNC: 14 MMOL/L — SIGNIFICANT CHANGE UP (ref 7–14)
APPEARANCE UR: ABNORMAL
BACTERIA # UR AUTO: NEGATIVE — SIGNIFICANT CHANGE UP
BASOPHILS # BLD AUTO: 0.04 K/UL — SIGNIFICANT CHANGE UP (ref 0–0.2)
BASOPHILS # BLD AUTO: 0.04 K/UL — SIGNIFICANT CHANGE UP (ref 0–0.2)
BASOPHILS # BLD AUTO: 0.05 K/UL — SIGNIFICANT CHANGE UP (ref 0–0.2)
BASOPHILS NFR BLD AUTO: 0.5 % — SIGNIFICANT CHANGE UP (ref 0–1)
BASOPHILS NFR BLD AUTO: 0.6 % — SIGNIFICANT CHANGE UP (ref 0–1)
BASOPHILS NFR BLD AUTO: 0.7 % — SIGNIFICANT CHANGE UP (ref 0–1)
BILIRUB UR-MCNC: NEGATIVE — SIGNIFICANT CHANGE UP
BLD GP AB SCN SERPL QL: SIGNIFICANT CHANGE UP
BUN SERPL-MCNC: 21 MG/DL — HIGH (ref 10–20)
CALCIUM SERPL-MCNC: 9.6 MG/DL — SIGNIFICANT CHANGE UP (ref 8.5–10.1)
CHLORIDE SERPL-SCNC: 106 MMOL/L — SIGNIFICANT CHANGE UP (ref 98–110)
CO2 SERPL-SCNC: 22 MMOL/L — SIGNIFICANT CHANGE UP (ref 17–32)
COLOR SPEC: ABNORMAL
CREAT SERPL-MCNC: 1.2 MG/DL — SIGNIFICANT CHANGE UP (ref 0.7–1.5)
DIFF PNL FLD: ABNORMAL
EOSINOPHIL # BLD AUTO: 0.2 K/UL — SIGNIFICANT CHANGE UP (ref 0–0.7)
EOSINOPHIL # BLD AUTO: 0.21 K/UL — SIGNIFICANT CHANGE UP (ref 0–0.7)
EOSINOPHIL # BLD AUTO: 0.25 K/UL — SIGNIFICANT CHANGE UP (ref 0–0.7)
EOSINOPHIL NFR BLD AUTO: 2.4 % — SIGNIFICANT CHANGE UP (ref 0–8)
EOSINOPHIL NFR BLD AUTO: 2.9 % — SIGNIFICANT CHANGE UP (ref 0–8)
EOSINOPHIL NFR BLD AUTO: 3.6 % — SIGNIFICANT CHANGE UP (ref 0–8)
EPI CELLS # UR: 8 /HPF — HIGH (ref 0–5)
GLUCOSE BLDC GLUCOMTR-MCNC: 126 MG/DL — HIGH (ref 70–99)
GLUCOSE SERPL-MCNC: 148 MG/DL — HIGH (ref 70–99)
GLUCOSE UR QL: NEGATIVE — SIGNIFICANT CHANGE UP
HCT VFR BLD CALC: 26 % — LOW (ref 42–52)
HCT VFR BLD CALC: 28.7 % — LOW (ref 42–52)
HCT VFR BLD CALC: 30.5 % — LOW (ref 42–52)
HGB BLD-MCNC: 10.1 G/DL — LOW (ref 14–18)
HGB BLD-MCNC: 8.4 G/DL — LOW (ref 14–18)
HGB BLD-MCNC: 9.2 G/DL — LOW (ref 14–18)
HYALINE CASTS # UR AUTO: >145 /LPF — HIGH (ref 0–7)
IMM GRANULOCYTES NFR BLD AUTO: 0.5 % — HIGH (ref 0.1–0.3)
IMM GRANULOCYTES NFR BLD AUTO: 0.6 % — HIGH (ref 0.1–0.3)
IMM GRANULOCYTES NFR BLD AUTO: 0.7 % — HIGH (ref 0.1–0.3)
KETONES UR-MCNC: NEGATIVE — SIGNIFICANT CHANGE UP
LEUKOCYTE ESTERASE UR-ACNC: ABNORMAL
LYMPHOCYTES # BLD AUTO: 2.88 K/UL — SIGNIFICANT CHANGE UP (ref 1.2–3.4)
LYMPHOCYTES # BLD AUTO: 3.08 K/UL — SIGNIFICANT CHANGE UP (ref 1.2–3.4)
LYMPHOCYTES # BLD AUTO: 3.54 K/UL — HIGH (ref 1.2–3.4)
LYMPHOCYTES # BLD AUTO: 41.1 % — SIGNIFICANT CHANGE UP (ref 20.5–51.1)
LYMPHOCYTES # BLD AUTO: 43 % — SIGNIFICANT CHANGE UP (ref 20.5–51.1)
LYMPHOCYTES # BLD AUTO: 43.3 % — SIGNIFICANT CHANGE UP (ref 20.5–51.1)
MCHC RBC-ENTMCNC: 31 PG — SIGNIFICANT CHANGE UP (ref 27–31)
MCHC RBC-ENTMCNC: 31.4 PG — HIGH (ref 27–31)
MCHC RBC-ENTMCNC: 31.8 PG — HIGH (ref 27–31)
MCHC RBC-ENTMCNC: 32.1 G/DL — SIGNIFICANT CHANGE UP (ref 32–37)
MCHC RBC-ENTMCNC: 32.3 G/DL — SIGNIFICANT CHANGE UP (ref 32–37)
MCHC RBC-ENTMCNC: 33.1 G/DL — SIGNIFICANT CHANGE UP (ref 32–37)
MCV RBC AUTO: 93.6 FL — SIGNIFICANT CHANGE UP (ref 80–94)
MCV RBC AUTO: 98 FL — HIGH (ref 80–94)
MCV RBC AUTO: 98.5 FL — HIGH (ref 80–94)
MONOCYTES # BLD AUTO: 0.6 K/UL — SIGNIFICANT CHANGE UP (ref 0.1–0.6)
MONOCYTES # BLD AUTO: 0.71 K/UL — HIGH (ref 0.1–0.6)
MONOCYTES # BLD AUTO: 0.75 K/UL — HIGH (ref 0.1–0.6)
MONOCYTES NFR BLD AUTO: 10.5 % — HIGH (ref 1.7–9.3)
MONOCYTES NFR BLD AUTO: 8.6 % — SIGNIFICANT CHANGE UP (ref 1.7–9.3)
MONOCYTES NFR BLD AUTO: 8.7 % — SIGNIFICANT CHANGE UP (ref 1.7–9.3)
NEUTROPHILS # BLD AUTO: 3.03 K/UL — SIGNIFICANT CHANGE UP (ref 1.4–6.5)
NEUTROPHILS # BLD AUTO: 3.19 K/UL — SIGNIFICANT CHANGE UP (ref 1.4–6.5)
NEUTROPHILS # BLD AUTO: 3.64 K/UL — SIGNIFICANT CHANGE UP (ref 1.4–6.5)
NEUTROPHILS NFR BLD AUTO: 42.3 % — SIGNIFICANT CHANGE UP (ref 42.2–75.2)
NEUTROPHILS NFR BLD AUTO: 44.6 % — SIGNIFICANT CHANGE UP (ref 42.2–75.2)
NEUTROPHILS NFR BLD AUTO: 45.4 % — SIGNIFICANT CHANGE UP (ref 42.2–75.2)
NITRITE UR-MCNC: POSITIVE
NRBC # BLD: 0 /100 WBCS — SIGNIFICANT CHANGE UP (ref 0–0)
PH UR: 6.5 — SIGNIFICANT CHANGE UP (ref 5–8)
PLATELET # BLD AUTO: 195 K/UL — SIGNIFICANT CHANGE UP (ref 130–400)
PLATELET # BLD AUTO: 205 K/UL — SIGNIFICANT CHANGE UP (ref 130–400)
PLATELET # BLD AUTO: 233 K/UL — SIGNIFICANT CHANGE UP (ref 130–400)
POTASSIUM SERPL-MCNC: 4.4 MMOL/L — SIGNIFICANT CHANGE UP (ref 3.5–5)
POTASSIUM SERPL-SCNC: 4.4 MMOL/L — SIGNIFICANT CHANGE UP (ref 3.5–5)
PROT UR-MCNC: ABNORMAL
RBC # BLD: 2.64 M/UL — LOW (ref 4.7–6.1)
RBC # BLD: 2.93 M/UL — LOW (ref 4.7–6.1)
RBC # BLD: 3.26 M/UL — LOW (ref 4.7–6.1)
RBC # FLD: 14.3 % — SIGNIFICANT CHANGE UP (ref 11.5–14.5)
RBC # FLD: 14.4 % — SIGNIFICANT CHANGE UP (ref 11.5–14.5)
RBC # FLD: 15.4 % — HIGH (ref 11.5–14.5)
RBC CASTS # UR COMP ASSIST: >720 /HPF — HIGH (ref 0–4)
SODIUM SERPL-SCNC: 142 MMOL/L — SIGNIFICANT CHANGE UP (ref 135–146)
SP GR SPEC: 1.03 — HIGH (ref 1.01–1.02)
TROPONIN T SERPL-MCNC: <0.01 NG/ML — SIGNIFICANT CHANGE UP
UROBILINOGEN FLD QL: SIGNIFICANT CHANGE UP
WBC # BLD: 7.01 K/UL — SIGNIFICANT CHANGE UP (ref 4.8–10.8)
WBC # BLD: 7.16 K/UL — SIGNIFICANT CHANGE UP (ref 4.8–10.8)
WBC # BLD: 8.17 K/UL — SIGNIFICANT CHANGE UP (ref 4.8–10.8)
WBC # FLD AUTO: 7.01 K/UL — SIGNIFICANT CHANGE UP (ref 4.8–10.8)
WBC # FLD AUTO: 7.16 K/UL — SIGNIFICANT CHANGE UP (ref 4.8–10.8)
WBC # FLD AUTO: 8.17 K/UL — SIGNIFICANT CHANGE UP (ref 4.8–10.8)
WBC UR QL: 29 /HPF — HIGH (ref 0–5)

## 2019-10-15 PROCEDURE — 99285 EMERGENCY DEPT VISIT HI MDM: CPT | Mod: 25,GC

## 2019-10-15 PROCEDURE — 99223 1ST HOSP IP/OBS HIGH 75: CPT

## 2019-10-15 PROCEDURE — 93010 ELECTROCARDIOGRAM REPORT: CPT

## 2019-10-15 PROCEDURE — 51702 INSERT TEMP BLADDER CATH: CPT | Mod: GC

## 2019-10-15 RX ORDER — CHLORHEXIDINE GLUCONATE 213 G/1000ML
1 SOLUTION TOPICAL
Refills: 0 | Status: DISCONTINUED | OUTPATIENT
Start: 2019-10-15 | End: 2019-10-17

## 2019-10-15 RX ORDER — PANTOPRAZOLE SODIUM 20 MG/1
40 TABLET, DELAYED RELEASE ORAL
Refills: 0 | Status: DISCONTINUED | OUTPATIENT
Start: 2019-10-15 | End: 2019-10-17

## 2019-10-15 RX ORDER — ATORVASTATIN CALCIUM 80 MG/1
10 TABLET, FILM COATED ORAL DAILY
Refills: 0 | Status: DISCONTINUED | OUTPATIENT
Start: 2019-10-15 | End: 2019-10-17

## 2019-10-15 RX ORDER — TAMSULOSIN HYDROCHLORIDE 0.4 MG/1
0.4 CAPSULE ORAL AT BEDTIME
Refills: 0 | Status: DISCONTINUED | OUTPATIENT
Start: 2019-10-15 | End: 2019-10-17

## 2019-10-15 RX ORDER — FINASTERIDE 5 MG/1
5 TABLET, FILM COATED ORAL DAILY
Refills: 0 | Status: DISCONTINUED | OUTPATIENT
Start: 2019-10-15 | End: 2019-10-17

## 2019-10-15 RX ORDER — SODIUM CHLORIDE 9 MG/ML
1000 INJECTION INTRAMUSCULAR; INTRAVENOUS; SUBCUTANEOUS ONCE
Refills: 0 | Status: COMPLETED | OUTPATIENT
Start: 2019-10-15 | End: 2019-10-15

## 2019-10-15 RX ORDER — RANOLAZINE 500 MG/1
500 TABLET, FILM COATED, EXTENDED RELEASE ORAL
Refills: 0 | Status: DISCONTINUED | OUTPATIENT
Start: 2019-10-15 | End: 2019-10-17

## 2019-10-15 RX ORDER — LISINOPRIL 2.5 MG/1
10 TABLET ORAL DAILY
Refills: 0 | Status: DISCONTINUED | OUTPATIENT
Start: 2019-10-15 | End: 2019-10-17

## 2019-10-15 RX ORDER — HYDROCHLOROTHIAZIDE 25 MG
25 TABLET ORAL DAILY
Refills: 0 | Status: DISCONTINUED | OUTPATIENT
Start: 2019-10-15 | End: 2019-10-17

## 2019-10-15 RX ORDER — CEFTRIAXONE 500 MG/1
1000 INJECTION, POWDER, FOR SOLUTION INTRAMUSCULAR; INTRAVENOUS ONCE
Refills: 0 | Status: COMPLETED | OUTPATIENT
Start: 2019-10-15 | End: 2019-10-15

## 2019-10-15 RX ORDER — MEROPENEM 1 G/30ML
1000 INJECTION INTRAVENOUS EVERY 8 HOURS
Refills: 0 | Status: DISCONTINUED | OUTPATIENT
Start: 2019-10-15 | End: 2019-10-17

## 2019-10-15 RX ORDER — SODIUM CHLORIDE 9 MG/ML
3 INJECTION INTRAMUSCULAR; INTRAVENOUS; SUBCUTANEOUS EVERY 8 HOURS
Refills: 0 | Status: DISCONTINUED | OUTPATIENT
Start: 2019-10-15 | End: 2019-10-17

## 2019-10-15 RX ADMIN — SODIUM CHLORIDE 1000 MILLILITER(S): 9 INJECTION INTRAMUSCULAR; INTRAVENOUS; SUBCUTANEOUS at 04:21

## 2019-10-15 RX ADMIN — ATORVASTATIN CALCIUM 10 MILLIGRAM(S): 80 TABLET, FILM COATED ORAL at 14:32

## 2019-10-15 RX ADMIN — RANOLAZINE 500 MILLIGRAM(S): 500 TABLET, FILM COATED, EXTENDED RELEASE ORAL at 17:52

## 2019-10-15 RX ADMIN — MEROPENEM 100 MILLIGRAM(S): 1 INJECTION INTRAVENOUS at 14:33

## 2019-10-15 RX ADMIN — SODIUM CHLORIDE 3 MILLILITER(S): 9 INJECTION INTRAMUSCULAR; INTRAVENOUS; SUBCUTANEOUS at 21:23

## 2019-10-15 RX ADMIN — FINASTERIDE 5 MILLIGRAM(S): 5 TABLET, FILM COATED ORAL at 14:32

## 2019-10-15 RX ADMIN — TAMSULOSIN HYDROCHLORIDE 0.4 MILLIGRAM(S): 0.4 CAPSULE ORAL at 21:32

## 2019-10-15 RX ADMIN — LISINOPRIL 10 MILLIGRAM(S): 2.5 TABLET ORAL at 14:33

## 2019-10-15 RX ADMIN — MEROPENEM 100 MILLIGRAM(S): 1 INJECTION INTRAVENOUS at 21:31

## 2019-10-15 RX ADMIN — Medication 25 MILLIGRAM(S): at 14:32

## 2019-10-15 RX ADMIN — PANTOPRAZOLE SODIUM 40 MILLIGRAM(S): 20 TABLET, DELAYED RELEASE ORAL at 14:33

## 2019-10-15 RX ADMIN — CEFTRIAXONE 100 MILLIGRAM(S): 500 INJECTION, POWDER, FOR SOLUTION INTRAMUSCULAR; INTRAVENOUS at 06:54

## 2019-10-15 RX ADMIN — SODIUM CHLORIDE 3 MILLILITER(S): 9 INJECTION INTRAMUSCULAR; INTRAVENOUS; SUBCUTANEOUS at 06:43

## 2019-10-15 RX ADMIN — SODIUM CHLORIDE 3 MILLILITER(S): 9 INJECTION INTRAMUSCULAR; INTRAVENOUS; SUBCUTANEOUS at 14:42

## 2019-10-15 NOTE — PROGRESS NOTE ADULT - SUBJECTIVE AND OBJECTIVE BOX
75 yo male with muscle invasive bladder cancer, on Plavix, presented to ED with clot retention. Pt was sent home after catheter placement, but came back shortly after a few hours due to no UOP in leg bag. Pt also keeps going into clot retention. Hg dropped from baseline of 11 to 9.2->8.4, pt currently getting transfused. Last time pt took Plavix was yesterday. Pt denies n/v/f/c    Vital Signs Last 24 Hrs  T(C): 36.7 (15 Oct 2019 08:00), Max: 36.7 (15 Oct 2019 08:00)  T(F): 98 (15 Oct 2019 08:00), Max: 98 (15 Oct 2019 08:00)  HR: 70 (15 Oct 2019 08:00) (70 - 113)  BP: 155/72 (15 Oct 2019 08:00) (115/61 - 169/73)  RR: 18 (15 Oct 2019 08:00) (14 - 18)  SpO2: 97% (15 Oct 2019 08:00) (96% - 99%)    pt seen and examined at bedside  a+ox3, nad non toxic  abd - soft, nd, nttp, no spt  gu - genitalia wnl, + 16 fr Talamantes, urine bright red, + small clots, catheter irrigated with 150 cc sterile water                          8.4    7.01  )-----------( 205      ( 15 Oct 2019 06:36 )             26.0   10-15    142  |  106  |  21<H>  ----------------------------<  148<H>  4.4   |  22  |  1.2    Ca    9.6      15 Oct 2019 04:00    Urinalysis Basic - ( 15 Oct 2019 04:00 )    Color: Dark Red / Appearance: Turbid / S.031 / pH: x  Gluc: x / Ketone: Negative  / Bili: Negative / Urobili: <2 mg/dL   Blood: x / Protein: 100 mg/dL / Nitrite: Positive   Leuk Esterase: Small / RBC: >720 /HPF / WBC 29 /HPF   Sq Epi: x / Non Sq Epi: 8 /HPF / Bacteria: Negative

## 2019-10-15 NOTE — PATIENT PROFILE ADULT - DO YOU FEEL UNSAFE AT HOME, WORK, OR SCHOOL?
Patient with drug tox, possible early rhabdomyolysis.  VSS.  Low suspicion for internal traumatic injury.  CIWA score 0.  Family at bedside.  Patient is to be admitted to the hospital and the case was discussed with the admitting physician.  Any changes in plan, additional imaging/labs, and further work up will be at the discretion of the admitting physician.  Patient remained stable in my care.
no

## 2019-10-15 NOTE — H&P ADULT - ASSESSMENT
Pt is a 73y/o M with PMHx of CAD s/p stents several years ago, HTN, HLD, DMII, Prostate cancer ( 2013) in remission,  TURBT for small bladder base tumor (July 2019), path showing high grade cancer with extensive invasion into deep smooth muscle/muscularis propria, focal squamous metaplasia, glandular differentiation, seen by Dr. Butterfield 10/9/19, was planning for a cystoscopy + fulguration admitted for urinary retention due to blood clots in the bladder    # Urinary retention due to blood clots in the bladder:  - S/p irrigation of the palomo with 1 L SW-> blood tinged urine  - S/p 2 PRBC for anemia (Hb drop from 12 to 8), keep active T&S  - CBC q12h , 2x 18G IV  - f/up urology recs. patient might need cystoscopy with Dr. Butterfield    # Positive UA:  - Hx of Enterococcus avium multisensitive in the urine (05/19)  - Start on Meropenem 1g q8h IV for now, will wait for cultures to adjust dosing    # CAD s/p stents:  - Patient states that he's on plavix and had stents placed several years ago, not on aspirin though.  - Hold Plavix for now, pending urology recs. Patient's cardiologist is not at [facility].  - Patient on atorvastatin 10 mg ( not high potency statin)  - Not on BB    # CKD III - stable:  - Creatinine at baseline ( around 1.3)  - c/w lisinopril and HCTZ  - Daily BMP    # HLD: c/w atorvastatin 10 mg PO qd    # DMII:  - Hold oral antidiabetics  - Monitor FS qac/hs and start insulin as needed    # Unstable angina:  - Patient on nitroglycerin sublingual and ranexa.  - Need to have a stress test outpatient    # Diet: Carb consistent DASH  # GI ppx: Protonix 40 mg PO qd  # DVT ppx: SCD  # Activity: ambulate as tolerated  # Dispo: From home  # Code status: FULL Pt is a 75y/o M with PMHx of CAD s/p stents several years ago, HTN, HLD, DMII, Prostate cancer ( 2013) in remission,  TURBT for small bladder base tumor (July 2019), path showing high grade cancer with extensive invasion into deep smooth muscle/muscularis propria, focal squamous metaplasia, glandular differentiation, seen by Dr. Butterfield 10/9/19, was planning for a cystoscopy + fulguration admitted for urinary retention due to blood clots in the bladder    # Urinary retention due to blood clots in the bladder:  - S/p irrigation of the palomo with 1 L SW-> blood tinged urine  - S/p 2 PRBC for anemia (Hb drop from 12 to 8), keep active T&S  - CBC q12h , 2x 18G IV  - f/up urology recs. patient might need cystoscopy with Dr. Butterfield    # Positive UA, needs to be treated as "cystitis-equivalent" in the setting of planned urological instrumentation  - Hx of Enterococcus avium multisensitive in the urine (05/19)  - Start on Meropenem 1g q8h IV for now, will wait for cultures to adjust dosing    # CAD s/p stents/ Recent Nuclear Stress testing (2018) was negative for ischemia  - Patient states that he's on plavix and had stents placed several years ago, not on aspirin though.  - Hold Plavix for now, pending urology recs. Patient's cardiologist is not at [facility].  - Patient on atorvastatin 10 mg ( not high potency statin)  - Not on BB    # CKD III - stable:  - Creatinine at baseline ( around 1.3)  - c/w lisinopril and HCTZ  - Daily BMP    # HLD: c/w atorvastatin 10 mg PO qd    # DMII:  - Hold oral antidiabetics  - Monitor FS qac/hs and start insulin as needed    # Unstable angina:  - Patient on nitroglycerin sublingual and ranexa.  - Need to have a stress test outpatient    # Diet: Carb consistent DASH  # GI ppx: Protonix 40 mg PO qd  # DVT ppx: SCD  # Activity: ambulate as tolerated  # Dispo: From home  # Code status: FULL Pt is a 73y/o M with PMHx of CAD s/p stents several years ago, HTN, HLD, DMII, Prostate cancer ( 2013) in remission,  TURBT for small bladder base tumor (July 2019), path showing high grade cancer with extensive invasion into deep smooth muscle/muscularis propria, focal squamous metaplasia, glandular differentiation, seen by Dr. Butterfield 10/9/19, was planning for a cystoscopy + fulguration admitted for urinary retention due to blood clots in the bladder    # Urinary retention due to blood clots in the bladder:  - S/p irrigation of the palomo with 1 L SW-> blood tinged urine  - S/p 2 PRBC for anemia (Hb drop from 12 to 8), keep active T&S  - CBC q12h , 2x 18G IV  - f/up urology recs. patient might need cystoscopy with Dr. Butterfield    # Positive UA, needs to be treated as "cystitis-equivalent" in the setting of planned urological instrumentation  - Hx of Enterococcus avium multisensitive in the urine (05/19)  - Start on Meropenem 1g q8h IV for now, will wait for cultures to adjust dosing    # CAD s/p stents/ Recent Nuclear Stress testing (2018) was negative for ischemia  - Patient states that he's on plavix and had stents placed several years ago, not on aspirin though.  - Hold Plavix for now, pending urology recs. Patient's cardiologist is not at [facility].  - Patient on atorvastatin 10 mg ( not high potency statin)  - Not on BB    # CKD III - stable:  - Creatinine at baseline ( around 1.3)  - c/w lisinopril and HCTZ  - Daily BMP    # HLD: c/w atorvastatin 10 mg PO qd    # DMII:  - Hold oral antidiabetics  - Monitor FS qac/hs and start insulin as needed    # CAD  - Patient on nitroglycerin sublingual and ranexa.  - Need to have cardiology Follow up as outpatient     # Diet: Carb consistent DASH  # GI ppx: Protonix 40 mg PO qd  # DVT ppx: SCD  # Activity: ambulate as tolerated  # Dispo: From home  # Code status: FULL

## 2019-10-15 NOTE — ED ADULT NURSE REASSESSMENT NOTE - NS ED NURSE REASSESS COMMENT FT1
Patient updated on plan of care, understanding was verbalized. Patient denies any complaints at this time. Will continue to monitor.

## 2019-10-15 NOTE — ED PROVIDER NOTE - OBJECTIVE STATEMENT
73 yo M with hx of HTN, HLD, DM II, previous prostate CA s/p radiation, with bladder mass followed with Dr. Butterfield, seen in the ED earlier today and palomo was placed with good flow, presents for urinary rentention, onset 3 hours ago, with no associated symptoms. NO fever, no chills, no headache, no nausea, no vomiting, no chest pain, no back pain, no abdominal pain. Pt states that he empty the bag, went to sleep and then woke up and there was no urine in the bag. He states he was afraid he would retained again. Pt denies any other symptoms.

## 2019-10-15 NOTE — PROGRESS NOTE ADULT - ASSESSMENT
73 yo male with muscle invasive bladder cancer, on Plavix with gross hematuria    acute blood loss anemia, pt currently getting transfused    plan  - admit to medicine  -please start working on medical clearance for OR, cystoscopy and fulguration  - trend h/h  -  renal/bladder sonogram  - irrigate catheter q shift and prn     will d/w Dr. Butterfield 73 yo male with muscle invasive bladder cancer, on Plavix with gross hematuria    acute blood loss anemia, pt currently getting transfused    plan  - admit to medicine  -please start working on medical clearance for OR, cystoscopy and fulguration  - trend h/h  -  renal/bladder sonogram  - irrigate catheter q shift and prn   -abx  -f/u ucx    will d/w Dr. Butterfield 75 yo male with muscle invasive bladder cancer, on Plavix with gross hematuria    acute blood loss anemia, pt currently getting transfused    plan  - admit to medicine  -please start working on medical clearance for OR, cystoscopy and fulguration  - trend h/h  -  renal/bladder sonogram  - irrigate catheter q shift and prn   -abx  -f/u ucx    will d/w Dr. Butterfield    addendum  pt booked for OR 10/17 for cystoscopy and fulguration of bleeding pending medical/cardiac clearance    under sterile technique a 20 fr 3-way catheter was placed w/o issue, bladder irrigated with 500 cc sterile water, scant amount of clots evacuated, urine bright red

## 2019-10-15 NOTE — CONSULT NOTE ADULT - SUBJECTIVE AND OBJECTIVE BOX
Patient is a 74y old  Male who presents with a chief complaint of     HPI: Pt is a 73y/o M with pmh CaP, s/p TURBT for small bladder base tumor, path showing high grade cancer with extensive invasion into deep smooth muscle/muscularis propria, focal squamous metaplasia, glandular differentiation, seen by Dr. Butterfield 10/9/19, was planning for a cystoscopy+fulguration. Presented to the ED initially in urinary retention, had palomo placed by ED, and wished to go home so was discharged. Pt went to sleep, and noticed the Palomo bag was not draining, so he came back to ED. Urology consulted for hematuria and possible clot retention. Upon examination of Palomo bag, there was +hematuria with no clots, pt states that he did not realize there was urine in the bag. Denies nausea, vomiting, fever, chills, abd pain, flank pain at this time.     PAST MEDICAL & SURGICAL HISTORY:  CAD, multiple vessel  Anemia  PVD (peripheral vascular disease)  Diabetes mellitus: type 2  Hypertension  Prostate CA: with radiation  S/P coronary artery stent placement  H/O hernia repair      REVIEW OF SYSTEMS:    CONSTITUTIONAL:  no fevers or chills  HEENT: No visual changes  ENDO: No sweating  NECK: No pain or stiffness  MUSCULOSKELETAL: No back pain, no joint pain  RESPIRATORY: No shortness of breath  CARDIOVASCULAR: No chest pain  GASTROINTESTINAL: No abdominal or epigastric pain. No nausea, vomiting,  No diarrhea or constipation.   NEUROLOGICAL: No mental status changes  PSYCH: No depression, no mood changes  SKIN: No itching      MEDICATIONS  (STANDING):  sodium chloride 0.9% lock flush 3 milliLiter(s) IV Push every 8 hours    Allergies  No Known Allergies    SOCIAL HISTORY: No illicit drug use    Vital Signs Last 24 Hrs  T(C): 35.7 (15 Oct 2019 02:48), Max: 36.1 (14 Oct 2019 21:44)  T(F): 96.3 (15 Oct 2019 02:48), Max: 97 (14 Oct 2019 21:44)  HR: 85 (15 Oct 2019 02:48) (85 - 113)  BP: 137/72 (15 Oct 2019 02:48) (115/61 - 147/85)  RR: 18 (15 Oct 2019 02:48) (14 - 18)  SpO2: 99% (15 Oct 2019 02:48) (96% - 99%)    PHYSICAL EXAM:    Constitutional: awake, alert, NAD  HEENT: EOMI  Neck: no pain  Back: No CVA tenderness  Respiratory: No accessory respiratory muscle use  Abd: soft, nontender, nondistended, no suprapubic tenderness  : +Palomo catheter in place draining blood tinged urine with no clots, uncircumcised, testicles b/l nontender  Extremities: no edema  Neurological: A/O x 3  Psychiatric: Normal mood, normal affect    I&O's Summary      LABS:                        9.2    8.17  )-----------( 233      ( 15 Oct 2019 04:00 )             28.7     10-15    142  |  106  |  21<H>  ----------------------------<  148<H>  4.4   |  22  |  1.2    Ca    9.6      15 Oct 2019 04:00

## 2019-10-15 NOTE — H&P ADULT - HISTORY OF PRESENT ILLNESS
Pt is a 73y/o M with pmh CaP, s/p TURBT for small bladder base tumor (July 2019), path showing high grade cancer with extensive invasion into deep smooth muscle/muscularis propria, focal squamous metaplasia, glandular differentiation, seen by Dr. Butterfield 10/9/19, was planning for a cystoscopy + fulguration. Patient presented to the ED initially on 10/14, in urinary retention, had a palomo placed by ED, and wished to go home so was discharged. Pt went to sleep, and noticed the Palomo bag was not draining, so he came back to ED. He was seen by urology who irrigated the catheter and did not retrieve clots, urine is still blood tinged. Denies nausea, vomiting, fever, chills, abd pain, flank pain at this time. Pt is a 75y/o M with PMHx of CAD s/p stents several years ago, HTN, HLD, DMII, Prostate cancer ( 2013) in remission,  TURBT for small bladder base tumor (July 2019), path showing high grade cancer with extensive invasion into deep smooth muscle/muscularis propria, focal squamous metaplasia, glandular differentiation, seen by Dr. Butterfield 10/9/19, was planning for a cystoscopy + fulguration presented for urinary retention. Patient presented to the ED initially on 10/14, in urinary retention and thought he might have clots in the bladder, had a palomo placed by ED, it drained clots initially and then clear urine, and wished to go home so he was discharged. He went to sleep, and noticed the Palomo bag was not draining, so he came back to ED. He was seen by urology who irrigated the catheter and did not retrieve clots, urine is still blood tinged. He mentions that he has been having intermittent hematuria for the past month or so after he had the TURBT. He also complains of chest pain that occurs at rest as well as shortness of breath on exertion. He also endorses intermittent headache which is not severe in intensity. Denies nausea, vomiting, fever, chills, abd pain, flank pain or LE edema.  In ED, VS wnl other than mild high blood pressure ( 154/72). Hb 8.4 from 12. 2 PRBC are being administered. UA was positive for blood, nitrite and WBC

## 2019-10-15 NOTE — ED PROVIDER NOTE - CLINICAL SUMMARY MEDICAL DECISION MAKING FREE TEXT BOX
Patient was signed out to me (Dr. Hernandez) by Dr. Simon. 75yo M with PMHx prostate CA, bladder CA, following with Dr. Butterfield, seen in ED yesterday for urinary retention, palomo placed with initial clots which cleared, good flow, discharged. Presents again today with retention. Palomo flushed with output of gross blood that is not clearing with flushing. 2U PRBC given for active bleeding and drop in Hb in ED. +ve UA which was treated with ABx. Vitals stable. D/w urology. Planning for cystoscopy/fulgaration. Will admit

## 2019-10-15 NOTE — ED ADULT NURSE REASSESSMENT NOTE - NS ED NURSE REASSESS COMMENT FT1
Patient c/o sharp pain to pelvis rated 10/10. Talamantes catheter irrigated with NS 60cc x 2 multiple clots expressed patient stated total pain relief after irrigation will continue to monitor. ED MD notified

## 2019-10-15 NOTE — H&P ADULT - NSHPSOCIALHISTORY_GEN_ALL_CORE
- Former smoker, quit 20 years ago  - occasional alcohol use  - No illicit drug use  - Lives alone at home, gets help from daughter  - independent on ADL, ambulates with no assistance

## 2019-10-15 NOTE — H&P ADULT - ATTENDING COMMENTS
Patient is seen and examined independently, I agree with resident note above and plan of care -edited and corrected where applicable- with addition:    PAST MEDICAL & SURGICAL HISTORY:  Bladder cancer  CAD, multiple vessel ( nuclear stress test: negative for ischemia)  Anemia  PVD (peripheral vascular disease)  Diabetes mellitus: type 2  Hypertension  Prostate CA: with radiation      TESTS & MEASUREMENTS:                        8.4    7.01  )-----------( 205      ( 15 Oct 2019 06:36 )             26.0     Hemoglobin Trend:  Hemoglobin: 8.4 g/dL (10-15 @ 06:36)  Hemoglobin: 9.2 g/dL (10-15 @ 04:00)    10-15    142  |  106  |  21<H>  ----------------------------<  148<H>  4.4   |  22  |  1.2    Ca    9.6      15 Oct 2019 04:00      Urinalysis Basic - ( 15 Oct 2019 04:00 )    Color: Dark Red / Appearance: Turbid / S.031 / pH: x  Gluc: x / Ketone: Negative  / Bili: Negative / Urobili: <2 mg/dL   Blood: x / Protein: 100 mg/dL / Nitrite: Positive   Leuk Esterase: Small / RBC: >720 /HPF / WBC 29 /HPF   Sq Epi: x / Non Sq Epi: 8 /HPF / Bacteria: Negative      In summary:  HEALTH ISSUES - PROBLEM Dx:  1. Anemia of acute blood loss, being monitored; recommend Hb at least at 7-8 in perioperative period.   2. Bladder cancer; complicated by hematuria and clot formation and obstructive uropathy  3. CAD s/p PCI and stenting in the past. 2018 nuclear stress testing is negative for ischemia.     Preoperative Eval:  patient is at least at moderate risk for intermediate risk procedure. He does tolerate METS~4 with little or no anginal symptoms. At this time (active bleeding Patient is seen and examined independently, I agree with resident note above and plan of care -edited and corrected where applicable- with addition:    PAST MEDICAL & SURGICAL HISTORY:  Bladder cancer  CAD, multiple vessel (2018 nuclear stress test: negative for ischemia)  Anemia  PVD (peripheral vascular disease)  Diabetes mellitus: type 2  Hypertension  Prostate CA, s/p radiation      TESTS & MEASUREMENTS:                        8.4    7.01  )-----------( 205      ( 15 Oct 2019 06:36 )             26.0     Hemoglobin Trend:  Hemoglobin: 8.4 g/dL (10-15 @ 06:36)  Hemoglobin: 9.2 g/dL (10-15 @ 04:00)    10-15    142  |  106  |  21<H>  ----------------------------<  148<H>  4.4   |  22  |  1.2    Ca    9.6      15 Oct 2019 04:00      Urinalysis Basic - ( 15 Oct 2019 04:00 )    Color: Dark Red / Appearance: Turbid / S.031 / pH: x  Gluc: x / Ketone: Negative  / Bili: Negative / Urobili: <2 mg/dL   Blood: x / Protein: 100 mg/dL / Nitrite: Positive   Leuk Esterase: Small / RBC: >720 /HPF / WBC 29 /HPF   Sq Epi: x / Non Sq Epi: 8 /HPF / Bacteria: Negative      In summary:  HEALTH ISSUES - PROBLEM Dx:  1. Anemia of acute blood loss, being monitored; recommend Hb at least at 7-8 in perioperative period.   2. Bladder cancer; complicated by hematuria and clot formation and obstructive uropathy  3. CAD s/p PCI and stenting in the past. 2018 nuclear stress testing is negative for ischemia.     Preoperative Eval:  patient is at least at moderate risk for intermediate risk procedure. He does tolerate METS~4 with little or no anginal symptoms. At this time (significant active bleeding), patient is not candidate for invasive cardiac testing or revascularization -exposure to antiplatelets/ anticoagulants. He has a previous nuclear stress testing less than one year ago that showed no evidence of ischemia. Patient denies significant worsening of cardio-resp symptoms (cp or sob) since past testing. patient understands all above and is agreeing to proceed with necessary surgical intervention under current risk.    .. F/U Hb/Ht and transfuse PRN/ Talamantes irrigation if needed as per  input/ broad-spectrum intravenous antibiotics until UCx available.  .. Case discussed with resident assigned.

## 2019-10-15 NOTE — ED PROVIDER NOTE - ATTENDING CONTRIBUTION TO CARE
73 yo M with hx of HTN, HLD, DM II, previous prostate CA s/p radiation, with bladder mass followed with Dr. Butterfield, seen in the ED earlier today for urinary retention x 12 hours, and palomo was placed with good flow. Now presents for Palomo malfunction, onset 3 hours ago, with grossly bloody urine that is now not flowing thru the catheter. NO fever, no chills, no headache, no nausea, no vomiting, no chest pain, no back pain, no abdominal pain. Pt states that he empty the bag, went to sleep and then woke up and there was no urine in the bag. He states he was afraid he would retained again. Pt denies any other symptoms. In ER, had palomo catheter flushed with return of flow. Exam as per resident note. Pt did not have any labs earlier but now that he is passing gross blood thru catheter with no clearing of the bloody urine will check cbc, bmp (for renal function), UA/UCX. Will reassess.

## 2019-10-15 NOTE — H&P ADULT - NSICDXPASTMEDICALHX_GEN_ALL_CORE_FT
PAST MEDICAL HISTORY:  Anemia     Bladder cancer     CAD, multiple vessel     Diabetes mellitus type 2    Hypertension     Prostate CA with radiation    PVD (peripheral vascular disease)

## 2019-10-15 NOTE — ED PROVIDER NOTE - CARE PLAN
Principal Discharge DX:	Hematuria  Secondary Diagnosis:	Anemia  Secondary Diagnosis:	Prostate CA  Secondary Diagnosis:	Urinary obstruction

## 2019-10-15 NOTE — H&P ADULT - NSHPLABSRESULTS_GEN_ALL_CORE
8.4    7.01  )-----------( 205      ( 15 Oct 2019 06:36 )             26.0       10-15    142  |  106  |  21<H>  ----------------------------<  148<H>  4.4   |  22  |  1.2    Ca    9.6      15 Oct 2019 04:00                Urinalysis Basic - ( 15 Oct 2019 04:00 )    Color: Dark Red / Appearance: Turbid / S.031 / pH: x  Gluc: x / Ketone: Negative  / Bili: Negative / Urobili: <2 mg/dL   Blood: x / Protein: 100 mg/dL / Nitrite: Positive   Leuk Esterase: Small / RBC: >720 /HPF / WBC 29 /HPF   Sq Epi: x / Non Sq Epi: 8 /HPF / Bacteria: Negative            Lactate Trend            CAPILLARY BLOOD GLUCOSE 8.4    7.01  )-----------( 205      ( 15 Oct 2019 06:36 )             26.0       10-15    142  |  106  |  21<H>  ----------------------------<  148<H>  4.4   |  22  |  1.2    Ca    9.6      15 Oct 2019 04:00                Urinalysis Basic - ( 15 Oct 2019 04:00 )    Color: Dark Red / Appearance: Turbid / S.031 / pH: x  Gluc: x / Ketone: Negative  / Bili: Negative / Urobili: <2 mg/dL   Blood: x / Protein: 100 mg/dL / Nitrite: Positive   Leuk Esterase: Small / RBC: >720 /HPF / WBC 29 /HPF   Sq Epi: x / Non Sq Epi: 8 /HPF / Bacteria: Negative    < from: 12 Lead ECG (10.15.19 @ 11:05) >     Sinus rhythm with 1st degree A-V block  Otherwise normal ECG    < from: NM Nuclear Stress Pharmacologic Multiple (18 @ 11:01) >    1. IV Adenosine Dual Isotope Study which was negative with respect to   symptoms and EKG changes.  2. Myocardial perfusion imaging reveals no fixed no reperfusion defects  3. Gated imaging reveals normal wall motion thickening and ejection   fraction

## 2019-10-15 NOTE — ED PROVIDER NOTE - PROGRESS NOTE DETAILS
Urology PA at bedside Urology PA flushed the palomo, good flow good return with pinkish no blood. Ok to discharge and follow up with urology Dr. Butterfield spoke to Urology PA on call. Informed of the drop in the HGB. Based on the lab values in [facility] this is bw a 2-3 point drop in HGB. Pt states he had bloodwork done last week that was sent to [facility]. but unsure of any of those results. Spoke to Urology to discuss with [facility] the blood work findings today in ER, and if this is an new/acute drop, or if labs from last week similar, and if he wants patient admitted for bladder irrigation vs cystoscopy at [facility]? Awaiting call for plan from Urology Attending. s/o to GABRIELA Lincoln for continued care pt already consented for transfusion. Endorsed to Dr Hernandez, pending urology consult and dispo gilbert - s/o to me by Dr. Simon. AYAN Bishop flushed palomo again, still outputting gross blood. 2U PRBCs ordered. Pending urology eval.

## 2019-10-15 NOTE — H&P ADULT - NSHPPHYSICALEXAM_GEN_ALL_CORE
GEN: No acute distress  LUNGS: Clear to auscultation bilaterally   HEART: S1/S2 present. RRR.   ABD: Soft, non-tender, non-distended. Bowel sounds present  EXT: NC/NC/NE/2+PP/LOPEZ/Skin Intact.   NEURO: AAOX3    Intravenous access: Peripheral access  NG tube: None  Talamantes Catheter: Yes GEN: No acute distress, NC/AT, anicteric  LUNGS: Clear to auscultation bilaterally, no wheezes  HEART: S1/S2 present. RRR. No murmurs or rubs appreciated  ABD: Soft, non-tender, non-distended. Bowel sounds present  EXT: No LE edema. Right buttock pressure ulcer with surrounding erythema, non purulent  NEURO: AAOX3, moves all extremities, no focal deficits    Intravenous access: Peripheral access  NG tube: None  Talamantes Catheter: Yes

## 2019-10-15 NOTE — H&P ADULT - NSHPREVIEWOFSYSTEMS_GEN_ALL_CORE
CONSTITUTIONAL: No weakness, fevers or chills  EYES/ENT: No visual changes;  No vertigo or throat pain   RESPIRATORY: No cough, wheezing, hemoptysis; No shortness of breath  CARDIOVASCULAR: No chest pain or palpitations  GASTROINTESTINAL: No abdominal or epigastric pain. No nausea, vomiting, or hematemesis; No diarrhea or constipation. No melena or hematochezia.  GENITOURINARY: Urinary retention  NEUROLOGICAL: No numbness or weakness  SKIN: No itching, rashes CONSTITUTIONAL: Intermittent HA  EYES/ENT: No visual changes;  No vertigo or throat pain   RESPIRATORY: Shortness of breath on exertion  CARDIOVASCULAR: Chest pain at rest  GASTROINTESTINAL: No abdominal or epigastric pain. No nausea, vomiting, or hematemesis; No diarrhea or constipation. No melena or hematochezia.  GENITOURINARY: Urinary retention  NEUROLOGICAL: No numbness or weakness  SKIN: right buttock ulcer

## 2019-10-16 DIAGNOSIS — Z02.9 ENCOUNTER FOR ADMINISTRATIVE EXAMINATIONS, UNSPECIFIED: ICD-10-CM

## 2019-10-16 LAB
ANION GAP SERPL CALC-SCNC: 12 MMOL/L — SIGNIFICANT CHANGE UP (ref 7–14)
APTT BLD: 29 SEC — SIGNIFICANT CHANGE UP (ref 27–39.2)
BASOPHILS # BLD AUTO: 0.05 K/UL — SIGNIFICANT CHANGE UP (ref 0–0.2)
BASOPHILS NFR BLD AUTO: 0.8 % — SIGNIFICANT CHANGE UP (ref 0–1)
BUN SERPL-MCNC: 12 MG/DL — SIGNIFICANT CHANGE UP (ref 10–20)
CALCIUM SERPL-MCNC: 9.3 MG/DL — SIGNIFICANT CHANGE UP (ref 8.5–10.1)
CHLORIDE SERPL-SCNC: 106 MMOL/L — SIGNIFICANT CHANGE UP (ref 98–110)
CO2 SERPL-SCNC: 23 MMOL/L — SIGNIFICANT CHANGE UP (ref 17–32)
CREAT SERPL-MCNC: 1.1 MG/DL — SIGNIFICANT CHANGE UP (ref 0.7–1.5)
CULTURE RESULTS: SIGNIFICANT CHANGE UP
EOSINOPHIL # BLD AUTO: 0.17 K/UL — SIGNIFICANT CHANGE UP (ref 0–0.7)
EOSINOPHIL NFR BLD AUTO: 2.7 % — SIGNIFICANT CHANGE UP (ref 0–8)
GLUCOSE BLDC GLUCOMTR-MCNC: 150 MG/DL — HIGH (ref 70–99)
GLUCOSE BLDC GLUCOMTR-MCNC: 153 MG/DL — HIGH (ref 70–99)
GLUCOSE BLDC GLUCOMTR-MCNC: 155 MG/DL — HIGH (ref 70–99)
GLUCOSE BLDC GLUCOMTR-MCNC: 159 MG/DL — HIGH (ref 70–99)
GLUCOSE SERPL-MCNC: 162 MG/DL — HIGH (ref 70–99)
HCT VFR BLD CALC: 31.8 % — LOW (ref 42–52)
HCT VFR BLD CALC: 32.5 % — LOW (ref 42–52)
HCT VFR BLD CALC: 32.8 % — LOW (ref 42–52)
HGB BLD-MCNC: 10.6 G/DL — LOW (ref 14–18)
HGB BLD-MCNC: 10.7 G/DL — LOW (ref 14–18)
HGB BLD-MCNC: 10.8 G/DL — LOW (ref 14–18)
IMM GRANULOCYTES NFR BLD AUTO: 0.6 % — HIGH (ref 0.1–0.3)
INR BLD: 1.05 RATIO — SIGNIFICANT CHANGE UP (ref 0.65–1.3)
LYMPHOCYTES # BLD AUTO: 2.44 K/UL — SIGNIFICANT CHANGE UP (ref 1.2–3.4)
LYMPHOCYTES # BLD AUTO: 38.4 % — SIGNIFICANT CHANGE UP (ref 20.5–51.1)
MAGNESIUM SERPL-MCNC: 2 MG/DL — SIGNIFICANT CHANGE UP (ref 1.8–2.4)
MCHC RBC-ENTMCNC: 30.5 PG — SIGNIFICANT CHANGE UP (ref 27–31)
MCHC RBC-ENTMCNC: 30.9 PG — SIGNIFICANT CHANGE UP (ref 27–31)
MCHC RBC-ENTMCNC: 31.4 PG — HIGH (ref 27–31)
MCHC RBC-ENTMCNC: 32.6 G/DL — SIGNIFICANT CHANGE UP (ref 32–37)
MCHC RBC-ENTMCNC: 32.9 G/DL — SIGNIFICANT CHANGE UP (ref 32–37)
MCHC RBC-ENTMCNC: 33.6 G/DL — SIGNIFICANT CHANGE UP (ref 32–37)
MCV RBC AUTO: 93.3 FL — SIGNIFICANT CHANGE UP (ref 80–94)
MCV RBC AUTO: 93.7 FL — SIGNIFICANT CHANGE UP (ref 80–94)
MCV RBC AUTO: 93.7 FL — SIGNIFICANT CHANGE UP (ref 80–94)
MONOCYTES # BLD AUTO: 0.65 K/UL — HIGH (ref 0.1–0.6)
MONOCYTES NFR BLD AUTO: 10.2 % — HIGH (ref 1.7–9.3)
NEUTROPHILS # BLD AUTO: 3 K/UL — SIGNIFICANT CHANGE UP (ref 1.4–6.5)
NEUTROPHILS NFR BLD AUTO: 47.3 % — SIGNIFICANT CHANGE UP (ref 42.2–75.2)
NRBC # BLD: 0 /100 WBCS — SIGNIFICANT CHANGE UP (ref 0–0)
PLATELET # BLD AUTO: 209 K/UL — SIGNIFICANT CHANGE UP (ref 130–400)
PLATELET # BLD AUTO: 221 K/UL — SIGNIFICANT CHANGE UP (ref 130–400)
PLATELET # BLD AUTO: 228 K/UL — SIGNIFICANT CHANGE UP (ref 130–400)
POTASSIUM SERPL-MCNC: 4.3 MMOL/L — SIGNIFICANT CHANGE UP (ref 3.5–5)
POTASSIUM SERPL-SCNC: 4.3 MMOL/L — SIGNIFICANT CHANGE UP (ref 3.5–5)
PROTHROM AB SERPL-ACNC: 12.1 SEC — SIGNIFICANT CHANGE UP (ref 9.95–12.87)
RBC # BLD: 3.41 M/UL — LOW (ref 4.7–6.1)
RBC # BLD: 3.47 M/UL — LOW (ref 4.7–6.1)
RBC # BLD: 3.5 M/UL — LOW (ref 4.7–6.1)
RBC # FLD: 15.2 % — HIGH (ref 11.5–14.5)
RBC # FLD: 15.2 % — HIGH (ref 11.5–14.5)
RBC # FLD: 15.4 % — HIGH (ref 11.5–14.5)
SODIUM SERPL-SCNC: 141 MMOL/L — SIGNIFICANT CHANGE UP (ref 135–146)
SPECIMEN SOURCE: SIGNIFICANT CHANGE UP
WBC # BLD: 6.35 K/UL — SIGNIFICANT CHANGE UP (ref 4.8–10.8)
WBC # BLD: 7.48 K/UL — SIGNIFICANT CHANGE UP (ref 4.8–10.8)
WBC # BLD: 8.56 K/UL — SIGNIFICANT CHANGE UP (ref 4.8–10.8)
WBC # FLD AUTO: 6.35 K/UL — SIGNIFICANT CHANGE UP (ref 4.8–10.8)
WBC # FLD AUTO: 7.48 K/UL — SIGNIFICANT CHANGE UP (ref 4.8–10.8)
WBC # FLD AUTO: 8.56 K/UL — SIGNIFICANT CHANGE UP (ref 4.8–10.8)

## 2019-10-16 PROCEDURE — 71045 X-RAY EXAM CHEST 1 VIEW: CPT | Mod: 26

## 2019-10-16 PROCEDURE — 99232 SBSQ HOSP IP/OBS MODERATE 35: CPT

## 2019-10-16 PROCEDURE — 76770 US EXAM ABDO BACK WALL COMP: CPT | Mod: 26

## 2019-10-16 PROCEDURE — 99233 SBSQ HOSP IP/OBS HIGH 50: CPT

## 2019-10-16 RX ORDER — DIBUCAINE 1 %
1 OINTMENT (GRAM) RECTAL
Refills: 0 | Status: DISCONTINUED | OUTPATIENT
Start: 2019-10-16 | End: 2019-10-17

## 2019-10-16 RX ORDER — INFLUENZA VIRUS VACCINE 15; 15; 15; 15 UG/.5ML; UG/.5ML; UG/.5ML; UG/.5ML
0.5 SUSPENSION INTRAMUSCULAR ONCE
Refills: 0 | Status: DISCONTINUED | OUTPATIENT
Start: 2019-10-16 | End: 2019-10-18

## 2019-10-16 RX ORDER — SODIUM CHLORIDE 9 MG/ML
1000 INJECTION, SOLUTION INTRAVENOUS
Refills: 0 | Status: DISCONTINUED | OUTPATIENT
Start: 2019-10-17 | End: 2019-10-17

## 2019-10-16 RX ADMIN — Medication 1 APPLICATION(S): at 18:51

## 2019-10-16 RX ADMIN — PANTOPRAZOLE SODIUM 40 MILLIGRAM(S): 20 TABLET, DELAYED RELEASE ORAL at 05:25

## 2019-10-16 RX ADMIN — RANOLAZINE 500 MILLIGRAM(S): 500 TABLET, FILM COATED, EXTENDED RELEASE ORAL at 18:52

## 2019-10-16 RX ADMIN — ATORVASTATIN CALCIUM 10 MILLIGRAM(S): 80 TABLET, FILM COATED ORAL at 12:50

## 2019-10-16 RX ADMIN — MEROPENEM 100 MILLIGRAM(S): 1 INJECTION INTRAVENOUS at 12:50

## 2019-10-16 RX ADMIN — TAMSULOSIN HYDROCHLORIDE 0.4 MILLIGRAM(S): 0.4 CAPSULE ORAL at 21:46

## 2019-10-16 RX ADMIN — Medication 25 MILLIGRAM(S): at 05:24

## 2019-10-16 RX ADMIN — LISINOPRIL 10 MILLIGRAM(S): 2.5 TABLET ORAL at 05:24

## 2019-10-16 RX ADMIN — RANOLAZINE 500 MILLIGRAM(S): 500 TABLET, FILM COATED, EXTENDED RELEASE ORAL at 05:25

## 2019-10-16 RX ADMIN — SODIUM CHLORIDE 3 MILLILITER(S): 9 INJECTION INTRAMUSCULAR; INTRAVENOUS; SUBCUTANEOUS at 02:52

## 2019-10-16 RX ADMIN — MEROPENEM 100 MILLIGRAM(S): 1 INJECTION INTRAVENOUS at 05:25

## 2019-10-16 RX ADMIN — SODIUM CHLORIDE 3 MILLILITER(S): 9 INJECTION INTRAMUSCULAR; INTRAVENOUS; SUBCUTANEOUS at 21:42

## 2019-10-16 RX ADMIN — MEROPENEM 100 MILLIGRAM(S): 1 INJECTION INTRAVENOUS at 21:46

## 2019-10-16 RX ADMIN — FINASTERIDE 5 MILLIGRAM(S): 5 TABLET, FILM COATED ORAL at 12:50

## 2019-10-16 RX ADMIN — SODIUM CHLORIDE 3 MILLILITER(S): 9 INJECTION INTRAMUSCULAR; INTRAVENOUS; SUBCUTANEOUS at 12:42

## 2019-10-16 NOTE — CONSULT NOTE ADULT - ASSESSMENT
75y/o M with bladder Ca and hematuria     - I irrigated the pt's catheter with 1L SW, no clots retrieved, blood still bloody tinged.  - Dr. Butterfield planning on cystoscopy, fulguration of bleeding vessel at some point, will discuss possible pre-op management at this time.
Patient is a 74y old  Male who presents with a chief complaint of Urinary Retention (16 Oct 2019 16:28)      Bladder cancer  Prostate cancer in remission  Stable CAD s/p PCI >1 year ago  Anemia  Hematuria  PVD (peripheral vascular disease) s/p peripheral stenting >1 year ago  Diabetes mellitus: type 2  Hypertension    Patient reports ability to exercise on a stationary bicycle and treadmill on low intensity, however, he is also limited by dyspnea when walking fast for 1 block.   Denies dyspnea or chest pain at rest, LE edema, PND or orthopnea  RCRI class III risk: 10.1% 30-day risk of death, MI or cardiac arrest  Obtain CXR prior to surgery.  He is considered at intermediate risk for a low risk procedure.  Resume ASA or plavix post-procedure

## 2019-10-16 NOTE — PROGRESS NOTE ADULT - ATTENDING COMMENTS
I saw , examined , evaluated patient ,and agree with PA findings /  plan
agree with above
Pt was seen and examined at bedside independently, pt is c/o blood in the urine. He has a h/o prostate and bladder CA, he was consulted by , cystoscopy was planned to tomorrow.   I agree with residents findings, assessment and plan above with few corrections in my note.   Pt is actively bleeding, will check CBC Q 12 hours, keep Hb above 7.5, NPO after midnight for cystoscopy tomorrow, monitor urine output closely and flush Talamantes if output is low ( pt might have blood clots in the bladder and tubing).     #Progress Note Handoff  Pending (specify):  CBC Q 12h, NPO after midnight for cystoscopy tomorrow   Family discussion: n/a, I spoke with pt, he agreed with a plan   Disposition: Home___/SNF___/Other________/Unknown at this time____x____

## 2019-10-16 NOTE — PROGRESS NOTE ADULT - SUBJECTIVE AND OBJECTIVE BOX
SUBJECTIVE:    Patient is a 74y old Male who presents with a chief complaint of Urinary Retention (16 Oct 2019 16:28)    Overnight Events:  Patient was seen at bed side this morning . patient endorsed some pain around catheter site. patient denied chest pain , sob, n/v, abdominal pain. diarrhea , constipation.     PAST MEDICAL & SURGICAL HISTORY  Bladder cancer  CAD, multiple vessel  Anemia  PVD (peripheral vascular disease)  Diabetes mellitus: type 2  Hypertension  Prostate CA: with radiation  S/P coronary artery stent placement  H/O hernia repair    SOCIAL HISTORY:  Negative for smoking/alcohol/drug use.     ALLERGIES:  No Known Allergies    MEDICATIONS:  STANDING MEDICATIONS  atorvastatin Oral Tab/Cap - Peds 10 milliGRAM(s) Oral daily  chlorhexidine 4% Liquid 1 Application(s) Topical <User Schedule>  finasteride 5 milliGRAM(s) Oral daily  hydrochlorothiazide 25 milliGRAM(s) Oral daily  influenza   Vaccine 0.5 milliLiter(s) IntraMuscular once  lisinopril 10 milliGRAM(s) Oral daily  meropenem  IVPB 1000 milliGRAM(s) IV Intermittent every 8 hours  pantoprazole    Tablet 40 milliGRAM(s) Oral before breakfast  ranolazine 500 milliGRAM(s) Oral two times a day  sodium chloride 0.9% lock flush 3 milliLiter(s) IV Push every 8 hours  tamsulosin 0.4 milliGRAM(s) Oral at bedtime    PRN MEDICATIONS    VITALS:   T(F): 96.7  HR: 74  BP: 134/69  RR: 20  SpO2: 96%    10-15-19 @ 07:01  -  10-16-19 @ 07:00  --------------------------------------------------------  IN: 0 mL / OUT: 1600 mL / NET: -1600 mL    10-16-19 @ 07:01  -  10-16-19 @ 17:48  --------------------------------------------------------  IN: 0 mL / OUT: 1250 mL / NET: -1250 mL      PHYSICAL EXAM:  GEN: No acute distress, NC/AT  LUNGS: Ctab, no wheezes  HEART: S1/S2 present. RRR. No murmurs  ABD: Soft, non-tender, non-distended. Bowel+  EXT: No LE edema. Right buttock pressure ulcer   NEURO: AAOX3, moves all extremities, no focal deficits  LABS:                        10.6   7.48  )-----------( 221      ( 16 Oct 2019 11:53 )             32.5     10-16    141  |  106  |  12  ----------------------------<  162<H>  4.3   |  23  |  1.1    Ca    9.3      16 Oct 2019 07:34  Mg     2.0     10-16      PT/INR - ( 16 Oct 2019 07:34 )   PT: 12.10 sec;   INR: 1.05 ratio         PTT - ( 16 Oct 2019 07:34 )  PTT:29.0 sec  Urinalysis Basic - ( 15 Oct 2019 04:00 )    Color: Dark Red / Appearance: Turbid / S.031 / pH: x  Gluc: x / Ketone: Negative  / Bili: Negative / Urobili: <2 mg/dL   Blood: x / Protein: 100 mg/dL / Nitrite: Positive   Leuk Esterase: Small / RBC: >720 /HPF / WBC 29 /HPF   Sq Epi: x / Non Sq Epi: 8 /HPF / Bacteria: Negative            Culture - Urine (collected 15 Oct 2019 04:00)  Source: .Urine Catheterized  Final Report (16 Oct 2019 10:36):    >=3 organisms. Probable collection contamination.      CARDIAC MARKERS ( 15 Oct 2019 17:34 )  x     / <0.01 ng/mL / x     / x     / x              10-15-19 @ 07:01  -  10-16-19 @ 07:00  --------------------------------------------------------  IN: 0 mL / OUT: 1600 mL / NET: -1600 mL    10-16-19 @ 07:01  -  10-16-19 @ 17:48  --------------------------------------------------------  IN: 0 mL / OUT: 1250 mL / NET: -1250 mL          Radiology:

## 2019-10-16 NOTE — CONSULT NOTE ADULT - ATTENDING COMMENTS
pt seen and examined at bedside  agree with above  given persistent hematuria and drop in hemoglobin requiring transfusion (anemia secondary to blood loss), will proceed with plan for cysto and fulguration of bleeding in operating room  awaiting final clearance and help from cardiology determining if safe to keep off plavix
Patient seen, case d/w cardiology fellow and medical resident.  Agree with above assessment.  F/u with Dr. Genao as outpatient (will need to resume antiplatelet therapy at some point).

## 2019-10-16 NOTE — PROGRESS NOTE ADULT - ASSESSMENT
74 y.o M with PMH of muscle invasive bladder Ca on Plavix  with gross hematuria, acute blood loss anemia s/p blood transfusion       Plan:   Medical clearance appreciated moderate risk for intermediate risk procedure.  Please Obtain Cardiology clearance, as well regarding Plavix administration, currently on hold.    OR booked x tomorrow x cystoscopy and fulguration of bladder.   Monitor H/H   Keep NPO after midnight   Irrigate PRN, or Q4-6H    attending will F/U 74 y.o M with PMH of muscle invasive bladder Ca on Plavix    1. gross  hematuria,  2.  acute  blood loss anemia s/p blood transfusion       Plan:   Medical clearance appreciated  ***  moderate risk for intermediate risk procedure.  Please Obtain Cardiology clearance, as well regarding Plavix administration, currently on hold.    OR booked x tomorrow x cystoscopy and fulguration of bladder.   Monitor H/H   Keep NPO after midnight   Irrigate PRN, or Q4-6H    attending will F/U

## 2019-10-16 NOTE — PROGRESS NOTE ADULT - ASSESSMENT
Pt is a 73y/o M with PMHx of CAD s/p stents several years ago, HTN, HLD, DMII, Prostate cancer ( 2013) in remission,  TURBT for small bladder base tumor (July 2019), path showing high grade cancer with extensive invasion into deep smooth muscle/muscularis propria, focal squamous metaplasia, glandular differentiation, seen by Dr. Butterfield 10/9/19, was planning for a cystoscopy + fulguration admitted for urinary retention due to blood clots in the bladder    # Urinary retention due to blood clots in the bladder:  - S/p irrigation of the palomo with 1 L SW-> blood tinged urine  - S/p 2 PRBC for anemia (Hb drop from 12 to 8), keep active T&S  -OR booked x tomorrow x cystoscopy and fulguration of bladder.   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  - Hold Plavix for now, pending urology recs. Patient's cardiologist is not at [facility].  - Patient on atorvastatin 10 mg ( not high potency statin)  - Not on BB    # CKD III - stable:  - Creatinine at baseline ( around 1.3)  - c/w lisinopril and HCTZ  - Daily BMP    # HLD: c/w atorvastatin 10 mg PO qd    # DMII:  - Hold oral antidiabetics  - Monitor FS qac/hs and start insulin as needed    # CAD  - Patient on nitroglycerin sublingual and ranexa.  - Need to have cardiology Follow up as outpatient     # Diet: Carb consistent DASH  # GI ppx: Protonix 40 mg PO qd  # DVT ppx: SCD  # Activity: ambulate as tolerated  # Dispo: From home  # Code status: FULL Pt is a 75y/o M with PMHx of CAD s/p stents several years ago, HTN, HLD, DMII, Prostate cancer ( 2013) in remission,  TURBT for small bladder base tumor (July 2019), path showing high grade cancer with extensive invasion into deep smooth muscle/muscularis propria, focal squamous metaplasia, glandular differentiation, seen by Dr. Butterfield 10/9/19, was planning for a cystoscopy + fulguration admitted for urinary retention due to blood clots in the bladder    # Urinary retention due to blood clots in the bladder:  - S/p irrigation of the palomo with 1 L SW-> blood tinged urine  - S/p 2 PRBC for anemia (Hb drop from 12 to 8), keep active T&S  -OR booked x tomorrow x cystoscopy and fulguration of bladder.   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  - Hold Plavix for now, pending urology recs. Patient's cardiologist is not at [facility].  - Patient on atorvastatin 10 mg ( not high potency statin)  - Not on BB    # CKD III - stable:  - Creatinine at baseline ( around 1.3)  - c/w lisinopril and HCTZ  - Daily BMP    # HLD: c/w atorvastatin 10 mg PO qd    # DMII:  - Hold oral antidiabetics  - Monitor FS qac/hs and start insulin as needed    # CAD  - Patient on nitroglycerin sublingual and ranexa.  - Need to have cardiology Follow up as outpatient     # Diet: Carb consistent DASH  # GI ppx: Protonix 40 mg PO qd  # DVT ppx: SCD  # Activity: ambulate as tolerated  # Dispo: From home  # Code status: FULL

## 2019-10-16 NOTE — PROGRESS NOTE ADULT - SUBJECTIVE AND OBJECTIVE BOX
Pt. seen and examined, Talamantes catheter irrigated with 500 cc of sterile water , no clots. Pt. re-evaluated with Dr. Toledo with clearing up in Talamantes catheter tube. Afebrile     Vital Signs Last 24 Hrs  T(C): 35.9 (16 Oct 2019 12:50), Max: 36.7 (15 Oct 2019 20:00)  T(F): 96.7 (16 Oct 2019 12:50), Max: 98 (15 Oct 2019 20:00)  HR: 74 (16 Oct 2019 12:50) (74 - 82)  BP: 134/69 (16 Oct 2019 12:50) (134/69 - 187/88)  RR: 20 (16 Oct 2019 12:50) (18 - 20)  SpO2: 96% (15 Oct 2019 20:00) (96% - 98%)       MEDICATIONS  (STANDING):  atorvastatin Oral Tab/Cap - Peds 10 milliGRAM(s) Oral daily  chlorhexidine 4% Liquid 1 Application(s) Topical <User Schedule>  finasteride 5 milliGRAM(s) Oral daily  hydrochlorothiazide 25 milliGRAM(s) Oral daily  influenza   Vaccine 0.5 milliLiter(s) IntraMuscular once  lisinopril 10 milliGRAM(s) Oral daily  meropenem  IVPB 1000 milliGRAM(s) IV Intermittent every 8 hours  pantoprazole    Tablet 40 milliGRAM(s) Oral before breakfast  ranolazine 500 milliGRAM(s) Oral two times a day  sodium chloride 0.9% lock flush 3 milliLiter(s) IV Push every 8 hours  tamsulosin 0.4 milliGRAM(s) Oral at bedtime       PE:   General: WN/WD in NAD  HEENT: NC/AT, EOMI   Abdomen soft, ND/NT, palpable bladder   : + 3 way  20 Fr Talamantes catheter in place drains Bloody urine - starting to clearing up in the Talamantes catheter.   Extremities: LOPEZ x4     I&O's Detail    15 Oct 2019 07:01  -  16 Oct 2019 07:00  --------------------------------------------------------  IN:  Total IN: 0 mL    OUT:    Indwelling Catheter - Urethral: 1600 mL  Total OUT: 1600 mL    Total NET: -1600 mL      16 Oct 2019 07:01  -  16 Oct 2019 15:16  --------------------------------------------------------  IN:  Total IN: 0 mL    OUT:    Indwelling Catheter - Urethral: 500 mL  Total OUT: 500 mL    Total NET: -500 mL      LABS:                        10.6   7.48  )-----------( 221      ( 16 Oct 2019 11:53 )             32.5     10-16    141  |  106  |  12  ----------------------------<  162<H>  4.3   |  23  |  1.1    Ca    9.3      16 Oct 2019 07:34  Mg     2.0     10-16     Urinalysis (10.15.19 @ 04:00)    Glucose Qualitative, Urine: Negative    Blood, Urine: Large    pH Urine: 6.5    Color: Dark Red    Urine Appearance: Turbid    Bilirubin: Negative    Ketone - Urine: Negative    Specific Gravity: 1.031    Protein, Urine: 100 mg/dL    Urobilinogen: <2 mg/dL    Nitrite: Positive    Leukocyte Esterase Concentration: Small    Culture - Urine (10.15.19 @ 04:00)    Specimen Source: .Urine Catheterized    Culture Results:   >=3 organisms. Probable collection contamination.    < from: 12 Lead ECG (10.15.19 @ 11:05) >    Ventricular Rate 75 BPM    Atrial Rate 75 BPM    P-R Interval 220 ms    QRS Duration 96 ms    Q-T Interval 424 ms    QTC Calculation(Bezet) 473 ms    P Axis 106 degrees    R Axis 3 degrees    T Axis 38 degrees    Diagnosis Line Sinus rhythm with 1st degree A-V block  Otherwise normal ECG    Confirmed by LAURA RODRIGEZ MD ([id number]) on 10/15/2019 12:11:07 PM    < end of copied text >    RADIOLOGY & ADDITIONAL STUDIES:     < from: US Kidney and Bladder (10.16.19 @ 09:18) >    EXAM:  US KIDNEYS AND BLADDER            PROCEDURE DATE:  10/16/2019            INTERPRETATION:  CLINICAL HISTORY: Hematuria. History of bladder cancer    COMPARISON: None.    PROCEDURE: Retroperitoneal Ultrasound was performed.    FINDINGS:    RIGHT KIDNEY: Normal in echogenicity, size measuring 11.0 cm in length.   No evidence of calculus or hydronephrosis. 3.2 cm upper pole cyst.    LEFT KIDNEY: Normal in echogenicity, size measuring 11.6 cm in length.   Cortical scarring is noted. No evidence of calculus or hydronephrosis.    IMPRESSION:  No hydronephrosis.        MARQUIS ALVARADO M.D., ATTENDING RADIOLOGIST  This document has been electronically signed. Oct 16 2019  9:42AM    < end of copied text > 73 y/o male Pt. seen and examined, Talamantes catheter irrigated with 500 cc of sterile water , no clots. Pt. re-evaluated with Dr. Toledo with clearing up in Talamantes catheter tube. Afebrile     Vital Signs Last 24 Hrs  T(C): 35.9 (16 Oct 2019 12:50), Max: 36.7 (15 Oct 2019 20:00)  T(F): 96.7 (16 Oct 2019 12:50), Max: 98 (15 Oct 2019 20:00)  HR: 74 (16 Oct 2019 12:50) (74 - 82)  BP: 134/69 (16 Oct 2019 12:50) (134/69 - 187/88)  RR: 20 (16 Oct 2019 12:50) (18 - 20)  SpO2: 96% (15 Oct 2019 20:00) (96% - 98%)       MEDICATIONS  (STANDING):  atorvastatin Oral Tab/Cap - Peds 10 milliGRAM(s) Oral daily  chlorhexidine 4% Liquid 1 Application(s) Topical <User Schedule>  finasteride 5 milliGRAM(s) Oral daily  hydrochlorothiazide 25 milliGRAM(s) Oral daily  influenza   Vaccine 0.5 milliLiter(s) IntraMuscular once  lisinopril 10 milliGRAM(s) Oral daily  meropenem  IVPB 1000 milliGRAM(s) IV Intermittent every 8 hours  pantoprazole    Tablet 40 milliGRAM(s) Oral before breakfast  ranolazine 500 milliGRAM(s) Oral two times a day  sodium chloride 0.9% lock flush 3 milliLiter(s) IV Push every 8 hours  tamsulosin 0.4 milliGRAM(s) Oral at bedtime       PE:   General: WN/WD in NAD  HEENT: NC/AT, EOMI   Abdomen soft, ND/NT, palpable bladder   : + 3 way  20 Fr Talamantes catheter in place drains Bloody urine - starting to clearing up in the Talamantes catheter.   Extremities: LOPEZ x4     I&O's Detail    15 Oct 2019 07:01  -  16 Oct 2019 07:00  --------------------------------------------------------  IN:  Total IN: 0 mL    OUT:    Indwelling Catheter - Urethral: 1600 mL  Total OUT: 1600 mL    Total NET: -1600 mL      16 Oct 2019 07:01  -  16 Oct 2019 15:16  --------------------------------------------------------  IN:  Total IN: 0 mL    OUT:    Indwelling Catheter - Urethral: 500 mL  Total OUT: 500 mL    Total NET: -500 mL      LABS:                        10.6   7.48  )-----------( 221      ( 16 Oct 2019 11:53 )             32.5     10-16    141  |  106  |  12  ----------------------------<  162<H>  4.3   |  23  |  1.1    Ca    9.3      16 Oct 2019 07:34  Mg     2.0     10-16     Urinalysis (10.15.19 @ 04:00)    Glucose Qualitative, Urine: Negative    Blood, Urine: Large    pH Urine: 6.5    Color: Dark Red    Urine Appearance: Turbid    Bilirubin: Negative    Ketone - Urine: Negative    Specific Gravity: 1.031    Protein, Urine: 100 mg/dL    Urobilinogen: <2 mg/dL    Nitrite: Positive    Leukocyte Esterase Concentration: Small    Culture - Urine (10.15.19 @ 04:00)    Specimen Source: .Urine Catheterized    Culture Results:   >=3 organisms. Probable collection contamination.    < from: 12 Lead ECG (10.15.19 @ 11:05) >    Ventricular Rate 75 BPM    Atrial Rate 75 BPM    P-R Interval 220 ms    QRS Duration 96 ms    Q-T Interval 424 ms    QTC Calculation(Bezet) 473 ms    P Axis 106 degrees    R Axis 3 degrees    T Axis 38 degrees    Diagnosis Line Sinus rhythm with 1st degree A-V block  Otherwise normal ECG    Confirmed by LAURA RODRIGEZ MD ([id number]) on 10/15/2019 12:11:07 PM    < end of copied text >    RADIOLOGY & ADDITIONAL STUDIES:     < from: US Kidney and Bladder (10.16.19 @ 09:18) >    EXAM:  US KIDNEYS AND BLADDER            PROCEDURE DATE:  10/16/2019            INTERPRETATION:  CLINICAL HISTORY: Hematuria. History of bladder cancer    COMPARISON: None.    PROCEDURE: Retroperitoneal Ultrasound was performed.    FINDINGS:    RIGHT KIDNEY: Normal in echogenicity, size measuring 11.0 cm in length.   No evidence of calculus or hydronephrosis. 3.2 cm upper pole cyst.    LEFT KIDNEY: Normal in echogenicity, size measuring 11.6 cm in length.   Cortical scarring is noted. No evidence of calculus or hydronephrosis.    IMPRESSION:  No hydronephrosis.        MARQUIS ALVARADO M.D., ATTENDING RADIOLOGIST  This document has been electronically signed. Oct 16 2019  9:42AM    < end of copied text >

## 2019-10-16 NOTE — CONSULT NOTE ADULT - SUBJECTIVE AND OBJECTIVE BOX
Date of Admission: 10/15/2019    CHIEF COMPLAINT: Hematuria    HISTORY OF PRESENT ILLNESS: Pt is a 73y/o M with PMHx of CAD s/p stents several years ago, HTN, HLD, DMII, Prostate cancer ( 2013) in remission,  TURBT for small bladder base tumor (July 2019), path showing high grade cancer with extensive invasion into deep smooth muscle/muscularis propria, focal squamous metaplasia, glandular differentiation, seen by Dr. Butterfield 10/9/19, was planning for a cystoscopy + fulguration presented for urinary retention. Patient presented to the ED initially on 10/14, in urinary retention and thought he might have clots in the bladder, had a palomo placed by ED, it drained clots initially and then clear urine, and wished to go home so he was discharged. He went to sleep, and noticed the Palomo bag was not draining, so he came back to ED. He was seen by urology who irrigated the catheter and did not retrieve clots, urine is still blood tinged. He mentions that he has been having intermittent hematuria for the past month or so after he had the TURBT. He also complains of chest pain that occurs at rest as well as shortness of breath on exertion. He also endorses intermittent headache which is not severe in intensity. Denies nausea, vomiting, fever, chills, abd pain, flank pain or LE edema.  In ED, VS wnl other than mild high blood pressure ( 154/72). Hb 8.4 from 12. 2 PRBC are being administered. UA was positive for blood, nitrite and WBC (15 Oct 2019 09:33)      PAST MEDICAL & SURGICAL HISTORY:  Bladder cancer  CAD, multiple vessel  Anemia  PVD (peripheral vascular disease)  Diabetes mellitus: type 2  Hypertension  Prostate CA: with radiation  S/P coronary artery stent placement  H/O hernia repair      FAMILY HISTORY:  [ ] no pertinent family history of premature cardiovascular disease in first degree relatives.  Mother:   Father:   Siblings:     SOCIAL HISTORY:    [x] Non-smoker. Former smoker  [ ] Smoker  [ ] Alcohol    Allergies    No Known Allergies    Intolerances    	    REVIEW OF SYSTEMS:  CONSTITUTIONAL: denies fever, weight loss, or fatigue  CARDIOLOGY: denies chest pain, shortness of breath or syncopal episodes.   RESPIRATORY: denies shortness of breath, wheezing.   NEUROLOGICAL: denies weakness, no focal deficits to report.  ENDOCRINOLOGICAL: no recent change in diabetic medications.   GI: no BRBPR, no N,V, diarrhea.    PSYCHIATRY: normal mood and affect  HEENT: no nasal discharge, no ecchymosis  SKIN: no ecchymosis, no breakdown  MUSCULOSKELETAL: Full range of motion x4.     PHYSICAL EXAM:  T(C): 35.9 (10-16-19 @ 12:50), Max: 36.7 (10-15-19 @ 20:00)  HR: 74 (10-16-19 @ 12:50) (74 - 82)  BP: 134/69 (10-16-19 @ 12:50) (134/69 - 160/79)  RR: 20 (10-16-19 @ 12:50) (18 - 20)  SpO2: 96% (10-15-19 @ 20:00) (96% - 96%)  Wt(kg): --  I&O's Summary    15 Oct 2019 07:01  -  16 Oct 2019 07:00  --------------------------------------------------------  IN: 0 mL / OUT: 1600 mL / NET: -1600 mL    16 Oct 2019 07:01  -  16 Oct 2019 16:28  --------------------------------------------------------  IN: 0 mL / OUT: 1250 mL / NET: -1250 mL        General Appearance: well appearing, normal for age and gender. 	  Neck: normal JVP, no bruit.   Eyes: No xanthomalasia, Extra Ocular muscles intact.   Cardiovascular: regular rate and rhythm S1 S2, No JVD, No murmurs, No edema  Respiratory: Lungs clear to auscultation	  Psychiatry: Alert and oriented x 3, Mood & affect appropriate  Gastrointestinal:  Soft, Non-tender  Skin/Integumen: No rashes, No ecchymoses, No cyanosis	  Neurologic: Non-focal  Musculoskeletal/extremities: Normal range of motion, No clubbing, cyanosis or edema  Vascular: Peripheral pulses palpable 2+ bilaterally    LABS:	 	                          10.6   7.48  )-----------( 221      ( 16 Oct 2019 11:53 )             32.5     10-16    141  |  106  |  12  ----------------------------<  162<H>  4.3   |  23  |  1.1    Ca    9.3      16 Oct 2019 07:34  Mg     2.0     10-16      CARDIAC MARKERS ( 15 Oct 2019 17:34 )  x     / <0.01 ng/mL / x     / x     / x          PT/INR - ( 16 Oct 2019 07:34 )   PT: 12.10 sec;   INR: 1.05 ratio         PTT - ( 16 Oct 2019 07:34 )  PTT:29.0 sec        TELEMETRY EVENTS: not on telemetry	    ECG:  	NSR, 1st degree AVB  RADIOLOGY:  OTHER: 	    PREVIOUS DIAGNOSTIC TESTING:    [ ] Echocardiogram: 11/09/2018  EF 55%  Mild concentric LVH  Mod. dilated LA  [ ] Catheterization:  [ ] Stress Test:  	  < from: NM Nuclear Stress Pharmacologic Multiple (11.08.18 @ 11:01) >  Findings:  The overall quality of the study is fair  Rotational cine display reveals diffuse motion artifact  SPECT images demonstrate no fixed no reperfusion defects.    No pulmonary uptake no dilatation left ventricle.    Bull's-eye imaging similar findings  Gated SPECT imaging demonstrates normal wall motion thickening and   ejection fraction.  The left ventricular ejection fraction was calculated   to be greater than 55  %.  Impression:  1. IV Adenosine Dual Isotope Study which was negative with respect to   symptoms and EKG changes.  2. Myocardial perfusion imaging reveals no fixed no reperfusion defects  3. Gated imaging reveals normal wall motion thickening and ejection   fraction      < end of copied text >    	    Home Medications:  acetaminophen 325 mg oral tablet: 2 tab(s) orally once, As needed, Mild Pain (1 - 3) (30 Jul 2019 10:18)  atorvastatin 10 mg oral tablet: 1 tab(s) orally once a day (30 Jul 2019 08:07)  finasteride 5 mg oral tablet: 1 tab(s) orally once a day (30 Jul 2019 08:07)  glipiZIDE 5 mg oral tablet: 1 tab(s) orally once a day (30 Jul 2019 08:07)  Janumet 50 mg-500 mg oral tablet:  (30 Jul 2019 08:07)  lisinopril-hydroCHLOROthiazide 20 mg-25 mg oral tablet: 1 tab(s) orally once a day (30 Jul 2019 08:07)  Ranexa 500 mg oral tablet, extended release: 1 tab(s) orally 2 times a day (30 Jul 2019 08:07)    MEDICATIONS  (STANDING):  atorvastatin Oral Tab/Cap - Peds 10 milliGRAM(s) Oral daily  chlorhexidine 4% Liquid 1 Application(s) Topical <User Schedule>  finasteride 5 milliGRAM(s) Oral daily  hydrochlorothiazide 25 milliGRAM(s) Oral daily  influenza   Vaccine 0.5 milliLiter(s) IntraMuscular once  lisinopril 10 milliGRAM(s) Oral daily  meropenem  IVPB 1000 milliGRAM(s) IV Intermittent every 8 hours  pantoprazole    Tablet 40 milliGRAM(s) Oral before breakfast  ranolazine 500 milliGRAM(s) Oral two times a day  sodium chloride 0.9% lock flush 3 milliLiter(s) IV Push every 8 hours  tamsulosin 0.4 milliGRAM(s) Oral at bedtime    MEDICATIONS  (PRN): Date of Admission: 10/15/2019    CHIEF COMPLAINT: Hematuria    HISTORY OF PRESENT ILLNESS: Pt is a 75y/o M with PMHx of CAD s/p stents several years ago, HTN, HLD, DMII, Prostate cancer ( 2013) in remission,  TURBT for small bladder base tumor (July 2019), path showing high grade cancer with extensive invasion into deep smooth muscle/muscularis propria, focal squamous metaplasia, glandular differentiation, seen by Dr. Butterfield 10/9/19, was planning for a cystoscopy + fulguration presented for urinary retention. Patient presented to the ED initially on 10/14, in urinary retention and thought he might have clots in the bladder, had a palomo placed by ED, it drained clots initially and then clear urine, and wished to go home so he was discharged. He went to sleep, and noticed the Palomo bag was not draining, so he came back to ED. He was seen by urology who irrigated the catheter and did not retrieve clots, urine is still blood tinged. He mentions that he has been having intermittent hematuria for the past month or so after he had the TURBT. He also complains of chest pain that occurs at rest as well as shortness of breath on exertion. He also endorses intermittent headache which is not severe in intensity. Denies nausea, vomiting, fever, chills, abd pain, flank pain or LE edema.  In ED, VS wnl other than mild high blood pressure ( 154/72). Hb 8.4 from 12. 2 PRBC are being administered. UA was positive for blood, nitrite and WBC (15 Oct 2019 09:33)      PAST MEDICAL & SURGICAL HISTORY:  Bladder cancer  CAD, multiple vessel  Anemia  PVD (peripheral vascular disease)  Diabetes mellitus: type 2  Hypertension  Prostate CA: with radiation  S/P coronary artery stent placement  H/O hernia repair      FAMILY HISTORY:  [x] no pertinent family history of premature cardiovascular disease in first degree relatives.  Mother:   Father:   Siblings:     SOCIAL HISTORY:    [x] Non-smoker. Former smoker  [ ] Smoker  [ ] Alcohol    Allergies    No Known Allergies    Intolerances    	    REVIEW OF SYSTEMS:  CONSTITUTIONAL: denies fever, weight loss, or fatigue  CARDIOLOGY: denies chest pain, shortness of breath or syncopal episodes.   RESPIRATORY: denies shortness of breath, wheezing.   NEUROLOGICAL: denies weakness, no focal deficits to report.  ENDOCRINOLOGICAL: no recent change in diabetic medications.   GI: no BRBPR, no N,V, diarrhea.    PSYCHIATRY: normal mood and affect  HEENT: no nasal discharge, no ecchymosis  SKIN: no ecchymosis, no breakdown  MUSCULOSKELETAL: Full range of motion x4.     PHYSICAL EXAM:  T(C): 35.9 (10-16-19 @ 12:50), Max: 36.7 (10-15-19 @ 20:00)  HR: 74 (10-16-19 @ 12:50) (74 - 82)  BP: 134/69 (10-16-19 @ 12:50) (134/69 - 160/79)  RR: 20 (10-16-19 @ 12:50) (18 - 20)  SpO2: 96% (10-15-19 @ 20:00) (96% - 96%)  Wt(kg): --  I&O's Summary    15 Oct 2019 07:01  -  16 Oct 2019 07:00  --------------------------------------------------------  IN: 0 mL / OUT: 1600 mL / NET: -1600 mL    16 Oct 2019 07:01  -  16 Oct 2019 16:28  --------------------------------------------------------  IN: 0 mL / OUT: 1250 mL / NET: -1250 mL        General Appearance: well appearing, normal for age and gender. 	  Neck: normal JVP, no bruit.   Eyes: No xanthomalasia, Extra Ocular muscles intact.   Cardiovascular: regular rate and rhythm S1 S2, No JVD, No murmurs, No edema  Respiratory: Lungs clear to auscultation	  Psychiatry: Alert and oriented x 3, Mood & affect appropriate  Gastrointestinal:  Soft, Non-tender  Skin/Integumen: No rashes, No ecchymoses, No cyanosis	  Neurologic: Non-focal  Musculoskeletal/extremities: Normal range of motion, No clubbing, cyanosis or edema  Vascular: Peripheral pulses palpable 2+ bilaterally    LABS:	 	                          10.6   7.48  )-----------( 221      ( 16 Oct 2019 11:53 )             32.5     10-16    141  |  106  |  12  ----------------------------<  162<H>  4.3   |  23  |  1.1    Ca    9.3      16 Oct 2019 07:34  Mg     2.0     10-16      CARDIAC MARKERS ( 15 Oct 2019 17:34 )  x     / <0.01 ng/mL / x     / x     / x          PT/INR - ( 16 Oct 2019 07:34 )   PT: 12.10 sec;   INR: 1.05 ratio         PTT - ( 16 Oct 2019 07:34 )  PTT:29.0 sec        TELEMETRY EVENTS: not on telemetry	    ECG:  	NSR, 1st degree AVB  RADIOLOGY:  OTHER: 	    PREVIOUS DIAGNOSTIC TESTING:    [ ] Echocardiogram: 11/09/2018  EF 55%  Mild concentric LVH  Mod. dilated LA  [ ] Catheterization:  [ ] Stress Test:  	  < from: NM Nuclear Stress Pharmacologic Multiple (11.08.18 @ 11:01) >  Findings:  The overall quality of the study is fair  Rotational cine display reveals diffuse motion artifact  SPECT images demonstrate no fixed no reperfusion defects.    No pulmonary uptake no dilatation left ventricle.    Bull's-eye imaging similar findings  Gated SPECT imaging demonstrates normal wall motion thickening and   ejection fraction.  The left ventricular ejection fraction was calculated   to be greater than 55  %.  Impression:  1. IV Adenosine Dual Isotope Study which was negative with respect to   symptoms and EKG changes.  2. Myocardial perfusion imaging reveals no fixed no reperfusion defects  3. Gated imaging reveals normal wall motion thickening and ejection   fraction      < end of copied text >    	    Home Medications:  acetaminophen 325 mg oral tablet: 2 tab(s) orally once, As needed, Mild Pain (1 - 3) (30 Jul 2019 10:18)  atorvastatin 10 mg oral tablet: 1 tab(s) orally once a day (30 Jul 2019 08:07)  finasteride 5 mg oral tablet: 1 tab(s) orally once a day (30 Jul 2019 08:07)  glipiZIDE 5 mg oral tablet: 1 tab(s) orally once a day (30 Jul 2019 08:07)  Janumet 50 mg-500 mg oral tablet:  (30 Jul 2019 08:07)  lisinopril-hydroCHLOROthiazide 20 mg-25 mg oral tablet: 1 tab(s) orally once a day (30 Jul 2019 08:07)  Ranexa 500 mg oral tablet, extended release: 1 tab(s) orally 2 times a day (30 Jul 2019 08:07)    MEDICATIONS  (STANDING):  atorvastatin Oral Tab/Cap - Peds 10 milliGRAM(s) Oral daily  chlorhexidine 4% Liquid 1 Application(s) Topical <User Schedule>  finasteride 5 milliGRAM(s) Oral daily  hydrochlorothiazide 25 milliGRAM(s) Oral daily  influenza   Vaccine 0.5 milliLiter(s) IntraMuscular once  lisinopril 10 milliGRAM(s) Oral daily  meropenem  IVPB 1000 milliGRAM(s) IV Intermittent every 8 hours  pantoprazole    Tablet 40 milliGRAM(s) Oral before breakfast  ranolazine 500 milliGRAM(s) Oral two times a day  sodium chloride 0.9% lock flush 3 milliLiter(s) IV Push every 8 hours  tamsulosin 0.4 milliGRAM(s) Oral at bedtime    MEDICATIONS  (PRN):

## 2019-10-17 ENCOUNTER — RESULT REVIEW (OUTPATIENT)
Age: 74
End: 2019-10-17

## 2019-10-17 LAB
ALBUMIN SERPL ELPH-MCNC: 3.6 G/DL — SIGNIFICANT CHANGE UP (ref 3.5–5.2)
ALP SERPL-CCNC: 94 U/L — SIGNIFICANT CHANGE UP (ref 30–115)
ALT FLD-CCNC: 10 U/L — SIGNIFICANT CHANGE UP (ref 0–41)
ANION GAP SERPL CALC-SCNC: 13 MMOL/L — SIGNIFICANT CHANGE UP (ref 7–14)
APTT BLD: 28.4 SEC — SIGNIFICANT CHANGE UP (ref 27–39.2)
AST SERPL-CCNC: 14 U/L — SIGNIFICANT CHANGE UP (ref 0–41)
BASOPHILS # BLD AUTO: 0.04 K/UL — SIGNIFICANT CHANGE UP (ref 0–0.2)
BASOPHILS NFR BLD AUTO: 0.6 % — SIGNIFICANT CHANGE UP (ref 0–1)
BILIRUB SERPL-MCNC: 0.5 MG/DL — SIGNIFICANT CHANGE UP (ref 0.2–1.2)
BUN SERPL-MCNC: 15 MG/DL — SIGNIFICANT CHANGE UP (ref 10–20)
CALCIUM SERPL-MCNC: 9.1 MG/DL — SIGNIFICANT CHANGE UP (ref 8.5–10.1)
CHLORIDE SERPL-SCNC: 104 MMOL/L — SIGNIFICANT CHANGE UP (ref 98–110)
CO2 SERPL-SCNC: 23 MMOL/L — SIGNIFICANT CHANGE UP (ref 17–32)
CREAT SERPL-MCNC: 1 MG/DL — SIGNIFICANT CHANGE UP (ref 0.7–1.5)
EOSINOPHIL # BLD AUTO: 0.25 K/UL — SIGNIFICANT CHANGE UP (ref 0–0.7)
EOSINOPHIL NFR BLD AUTO: 3.8 % — SIGNIFICANT CHANGE UP (ref 0–8)
GLUCOSE BLDC GLUCOMTR-MCNC: 166 MG/DL — HIGH (ref 70–99)
GLUCOSE BLDC GLUCOMTR-MCNC: 191 MG/DL — HIGH (ref 70–99)
GLUCOSE BLDC GLUCOMTR-MCNC: 340 MG/DL — HIGH (ref 70–99)
GLUCOSE SERPL-MCNC: 179 MG/DL — HIGH (ref 70–99)
HCT VFR BLD CALC: 30.9 % — LOW (ref 42–52)
HCT VFR BLD CALC: 32.1 % — LOW (ref 42–52)
HGB BLD-MCNC: 10.3 G/DL — LOW (ref 14–18)
HGB BLD-MCNC: 10.5 G/DL — LOW (ref 14–18)
IMM GRANULOCYTES NFR BLD AUTO: 0.5 % — HIGH (ref 0.1–0.3)
INR BLD: 1.01 RATIO — SIGNIFICANT CHANGE UP (ref 0.65–1.3)
LYMPHOCYTES # BLD AUTO: 2.69 K/UL — SIGNIFICANT CHANGE UP (ref 1.2–3.4)
LYMPHOCYTES # BLD AUTO: 40.9 % — SIGNIFICANT CHANGE UP (ref 20.5–51.1)
MAGNESIUM SERPL-MCNC: 1.8 MG/DL — SIGNIFICANT CHANGE UP (ref 1.8–2.4)
MCHC RBC-ENTMCNC: 30.7 PG — SIGNIFICANT CHANGE UP (ref 27–31)
MCHC RBC-ENTMCNC: 30.9 PG — SIGNIFICANT CHANGE UP (ref 27–31)
MCHC RBC-ENTMCNC: 32.7 G/DL — SIGNIFICANT CHANGE UP (ref 32–37)
MCHC RBC-ENTMCNC: 33.3 G/DL — SIGNIFICANT CHANGE UP (ref 32–37)
MCV RBC AUTO: 92.8 FL — SIGNIFICANT CHANGE UP (ref 80–94)
MCV RBC AUTO: 93.9 FL — SIGNIFICANT CHANGE UP (ref 80–94)
MONOCYTES # BLD AUTO: 0.76 K/UL — HIGH (ref 0.1–0.6)
MONOCYTES NFR BLD AUTO: 11.6 % — HIGH (ref 1.7–9.3)
NEUTROPHILS # BLD AUTO: 2.8 K/UL — SIGNIFICANT CHANGE UP (ref 1.4–6.5)
NEUTROPHILS NFR BLD AUTO: 42.6 % — SIGNIFICANT CHANGE UP (ref 42.2–75.2)
NRBC # BLD: 0 /100 WBCS — SIGNIFICANT CHANGE UP (ref 0–0)
NRBC # BLD: 0 /100 WBCS — SIGNIFICANT CHANGE UP (ref 0–0)
PLATELET # BLD AUTO: 203 K/UL — SIGNIFICANT CHANGE UP (ref 130–400)
PLATELET # BLD AUTO: 210 K/UL — SIGNIFICANT CHANGE UP (ref 130–400)
POTASSIUM SERPL-MCNC: 3.9 MMOL/L — SIGNIFICANT CHANGE UP (ref 3.5–5)
POTASSIUM SERPL-SCNC: 3.9 MMOL/L — SIGNIFICANT CHANGE UP (ref 3.5–5)
PROT SERPL-MCNC: 6.4 G/DL — SIGNIFICANT CHANGE UP (ref 6–8)
PROTHROM AB SERPL-ACNC: 11.6 SEC — SIGNIFICANT CHANGE UP (ref 9.95–12.87)
RBC # BLD: 3.33 M/UL — LOW (ref 4.7–6.1)
RBC # BLD: 3.42 M/UL — LOW (ref 4.7–6.1)
RBC # FLD: 14.5 % — SIGNIFICANT CHANGE UP (ref 11.5–14.5)
RBC # FLD: 14.6 % — HIGH (ref 11.5–14.5)
SODIUM SERPL-SCNC: 140 MMOL/L — SIGNIFICANT CHANGE UP (ref 135–146)
WBC # BLD: 6.57 K/UL — SIGNIFICANT CHANGE UP (ref 4.8–10.8)
WBC # BLD: 7.12 K/UL — SIGNIFICANT CHANGE UP (ref 4.8–10.8)
WBC # FLD AUTO: 6.57 K/UL — SIGNIFICANT CHANGE UP (ref 4.8–10.8)
WBC # FLD AUTO: 7.12 K/UL — SIGNIFICANT CHANGE UP (ref 4.8–10.8)

## 2019-10-17 PROCEDURE — 99222 1ST HOSP IP/OBS MODERATE 55: CPT

## 2019-10-17 PROCEDURE — 71045 X-RAY EXAM CHEST 1 VIEW: CPT | Mod: 26

## 2019-10-17 PROCEDURE — 99232 SBSQ HOSP IP/OBS MODERATE 35: CPT

## 2019-10-17 PROCEDURE — 88307 TISSUE EXAM BY PATHOLOGIST: CPT | Mod: 26

## 2019-10-17 PROCEDURE — 99233 SBSQ HOSP IP/OBS HIGH 50: CPT

## 2019-10-17 RX ORDER — TAMSULOSIN HYDROCHLORIDE 0.4 MG/1
0.4 CAPSULE ORAL AT BEDTIME
Refills: 0 | Status: DISCONTINUED | OUTPATIENT
Start: 2019-10-17 | End: 2019-10-18

## 2019-10-17 RX ORDER — FINASTERIDE 5 MG/1
5 TABLET, FILM COATED ORAL DAILY
Refills: 0 | Status: DISCONTINUED | OUTPATIENT
Start: 2019-10-17 | End: 2019-10-18

## 2019-10-17 RX ORDER — CHLORHEXIDINE GLUCONATE 213 G/1000ML
1 SOLUTION TOPICAL
Refills: 0 | Status: DISCONTINUED | OUTPATIENT
Start: 2019-10-17 | End: 2019-10-18

## 2019-10-17 RX ORDER — MEROPENEM 1 G/30ML
1000 INJECTION INTRAVENOUS EVERY 8 HOURS
Refills: 0 | Status: DISCONTINUED | OUTPATIENT
Start: 2019-10-17 | End: 2019-10-18

## 2019-10-17 RX ORDER — RANOLAZINE 500 MG/1
500 TABLET, FILM COATED, EXTENDED RELEASE ORAL
Refills: 0 | Status: DISCONTINUED | OUTPATIENT
Start: 2019-10-17 | End: 2019-10-18

## 2019-10-17 RX ORDER — PANTOPRAZOLE SODIUM 20 MG/1
40 TABLET, DELAYED RELEASE ORAL
Refills: 0 | Status: DISCONTINUED | OUTPATIENT
Start: 2019-10-17 | End: 2019-10-18

## 2019-10-17 RX ORDER — ACETAMINOPHEN 500 MG
650 TABLET ORAL ONCE
Refills: 0 | Status: COMPLETED | OUTPATIENT
Start: 2019-10-17 | End: 2019-10-17

## 2019-10-17 RX ORDER — DIBUCAINE 1 %
1 OINTMENT (GRAM) RECTAL
Refills: 0 | Status: DISCONTINUED | OUTPATIENT
Start: 2019-10-17 | End: 2019-10-18

## 2019-10-17 RX ORDER — HYDROMORPHONE HYDROCHLORIDE 2 MG/ML
0.5 INJECTION INTRAMUSCULAR; INTRAVENOUS; SUBCUTANEOUS
Refills: 0 | Status: DISCONTINUED | OUTPATIENT
Start: 2019-10-17 | End: 2019-10-17

## 2019-10-17 RX ORDER — LISINOPRIL 2.5 MG/1
10 TABLET ORAL DAILY
Refills: 0 | Status: DISCONTINUED | OUTPATIENT
Start: 2019-10-17 | End: 2019-10-18

## 2019-10-17 RX ORDER — SODIUM CHLORIDE 9 MG/ML
1000 INJECTION, SOLUTION INTRAVENOUS
Refills: 0 | Status: DISCONTINUED | OUTPATIENT
Start: 2019-10-17 | End: 2019-10-17

## 2019-10-17 RX ORDER — HYDROCHLOROTHIAZIDE 25 MG
25 TABLET ORAL DAILY
Refills: 0 | Status: DISCONTINUED | OUTPATIENT
Start: 2019-10-17 | End: 2019-10-18

## 2019-10-17 RX ORDER — SODIUM CHLORIDE 9 MG/ML
1000 INJECTION, SOLUTION INTRAVENOUS
Refills: 0 | Status: DISCONTINUED | OUTPATIENT
Start: 2019-10-17 | End: 2019-10-18

## 2019-10-17 RX ORDER — SODIUM CHLORIDE 9 MG/ML
3 INJECTION INTRAMUSCULAR; INTRAVENOUS; SUBCUTANEOUS EVERY 8 HOURS
Refills: 0 | Status: DISCONTINUED | OUTPATIENT
Start: 2019-10-17 | End: 2019-10-18

## 2019-10-17 RX ORDER — ATORVASTATIN CALCIUM 80 MG/1
10 TABLET, FILM COATED ORAL DAILY
Refills: 0 | Status: DISCONTINUED | OUTPATIENT
Start: 2019-10-17 | End: 2019-10-18

## 2019-10-17 RX ADMIN — HYDROMORPHONE HYDROCHLORIDE 0.5 MILLIGRAM(S): 2 INJECTION INTRAMUSCULAR; INTRAVENOUS; SUBCUTANEOUS at 16:23

## 2019-10-17 RX ADMIN — Medication 650 MILLIGRAM(S): at 16:24

## 2019-10-17 RX ADMIN — SODIUM CHLORIDE 3 MILLILITER(S): 9 INJECTION INTRAMUSCULAR; INTRAVENOUS; SUBCUTANEOUS at 21:11

## 2019-10-17 RX ADMIN — LISINOPRIL 10 MILLIGRAM(S): 2.5 TABLET ORAL at 05:47

## 2019-10-17 RX ADMIN — RANOLAZINE 500 MILLIGRAM(S): 500 TABLET, FILM COATED, EXTENDED RELEASE ORAL at 05:49

## 2019-10-17 RX ADMIN — Medication 1 APPLICATION(S): at 05:47

## 2019-10-17 RX ADMIN — Medication 25 MILLIGRAM(S): at 05:48

## 2019-10-17 RX ADMIN — CHLORHEXIDINE GLUCONATE 1 APPLICATION(S): 213 SOLUTION TOPICAL at 05:47

## 2019-10-17 RX ADMIN — MEROPENEM 100 MILLIGRAM(S): 1 INJECTION INTRAVENOUS at 13:02

## 2019-10-17 RX ADMIN — PANTOPRAZOLE SODIUM 40 MILLIGRAM(S): 20 TABLET, DELAYED RELEASE ORAL at 06:34

## 2019-10-17 RX ADMIN — SODIUM CHLORIDE 75 MILLILITER(S): 9 INJECTION, SOLUTION INTRAVENOUS at 01:00

## 2019-10-17 RX ADMIN — MEROPENEM 100 MILLIGRAM(S): 1 INJECTION INTRAVENOUS at 22:35

## 2019-10-17 RX ADMIN — MEROPENEM 100 MILLIGRAM(S): 1 INJECTION INTRAVENOUS at 05:46

## 2019-10-17 RX ADMIN — SODIUM CHLORIDE 75 MILLILITER(S): 9 INJECTION, SOLUTION INTRAVENOUS at 11:20

## 2019-10-17 RX ADMIN — TAMSULOSIN HYDROCHLORIDE 0.4 MILLIGRAM(S): 0.4 CAPSULE ORAL at 22:35

## 2019-10-17 RX ADMIN — SODIUM CHLORIDE 3 MILLILITER(S): 9 INJECTION INTRAMUSCULAR; INTRAVENOUS; SUBCUTANEOUS at 13:03

## 2019-10-17 RX ADMIN — ATORVASTATIN CALCIUM 10 MILLIGRAM(S): 80 TABLET, FILM COATED ORAL at 17:25

## 2019-10-17 RX ADMIN — Medication 650 MILLIGRAM(S): at 22:35

## 2019-10-17 RX ADMIN — HYDROMORPHONE HYDROCHLORIDE 0.5 MILLIGRAM(S): 2 INJECTION INTRAMUSCULAR; INTRAVENOUS; SUBCUTANEOUS at 16:04

## 2019-10-17 RX ADMIN — RANOLAZINE 500 MILLIGRAM(S): 500 TABLET, FILM COATED, EXTENDED RELEASE ORAL at 22:35

## 2019-10-17 RX ADMIN — SODIUM CHLORIDE 3 MILLILITER(S): 9 INJECTION INTRAMUSCULAR; INTRAVENOUS; SUBCUTANEOUS at 05:35

## 2019-10-17 RX ADMIN — FINASTERIDE 5 MILLIGRAM(S): 5 TABLET, FILM COATED ORAL at 17:25

## 2019-10-17 RX ADMIN — Medication 650 MILLIGRAM(S): at 16:53

## 2019-10-17 RX ADMIN — SODIUM CHLORIDE 75 MILLILITER(S): 9 INJECTION, SOLUTION INTRAVENOUS at 22:34

## 2019-10-17 NOTE — BRIEF OPERATIVE NOTE - NSICDXBRIEFPROCEDURE_GEN_ALL_CORE_FT
PROCEDURES:  Cystoscopy, with bladder fulguration, with clot evacuation if indicated 17-Oct-2019 16:02:04  Scar Butterfield

## 2019-10-17 NOTE — MEDICAL STUDENT PROGRESS NOTE(EDUCATION) - NS MD HP STUD SUPERVISOR COMMENTS FT
I have read and edited this note and I agree with its contents. I have read and edited this note and I agree with its contents.    Attending' s attestation:  Pt was seen and examined at bedside independently, pt c/o blood in the urine. He has a h/o bladder CA, consulted by , cystoscopy was scheduled for today.  I agree with medical student/resident's assessment and plan above, will c/w current medical management and DVT prophylaxis.  Pt is able to ambulate, will consider TOV in 24 hours and anticipate discharge home in 24 hours. I have read and edited this note and I agree with its contents.    Attending' s attestation:  Pt was seen and examined at bedside independently, pt c/o blood in the urine. He has a h/o bladder CA, consulted by , cystoscopy was scheduled for today.  I agree with medical student/resident's assessment and plan above, will c/w current medical management and DVT prophylaxis.  Pt is able to ambulate, will consider TOV in 24 hours and anticipate discharge home tomorrow.

## 2019-10-17 NOTE — CHART NOTE - NSCHARTNOTEFT_GEN_A_CORE
PACU ANESTHESIA ADMISSION NOTE      Procedure: Cystoscopy   Post op diagnosis:  Hematuria     ____  Intubated  TV:______       Rate: ______      FiO2: ______    __x__  Patent Airway    __x__  Full return of protective reflexes    __x__  Full recovery from anesthesia / back to baseline status    Vitals:  T(C): 36.3 (10-17-19 @ 14:50), Max: 36.8 (10-16-19 @ 23:52)  HR: 71 (10-17-19 @ 14:50) (68 - 76)  BP: 158/76 (10-17-19 @ 14:50) (136/76 - 158/76)  RR: 20 (10-17-19 @ 14:50) (18 - 20)  SpO2: 97% (10-17-19 @ 08:14) (97% - 97%)    Mental Status:  __x__ Awake   ___x__ Alert   _____ Drowsy   _____ Sedated    Nausea/Vomiting:  __x__ NO  ______Yes,   See Post - Op Orders          Pain Scale (0-10):  _____    Treatment: ____ None    __x__ See Post - Op/PCA Orders    Post - Operative Fluids:   ____ Oral   __x__ See Post - Op Orders    Plan: Discharge:   ____Home       _____Floor     _____Critical Care    _____  Other:_________________    Comments: Patient had smooth intraoperative event, no anesthesia complication.  PACU Vital signs: HR:    72   BP:  134    /   67     RR:   14     O2 Sat:  98   %     Temp  97.5

## 2019-10-17 NOTE — PRE-OP CHECKLIST - SELECT TESTS ORDERED
Urinalysis/CBC/Type and Cross/PT/PTT/Type and Screen/POCT Blood Glucose/EKG Type and Cross/Type and Screen/none/EKG/PT/PTT/Urinalysis/POCT Blood Glucose/CBC

## 2019-10-17 NOTE — MEDICAL STUDENT PROGRESS NOTE(EDUCATION) - SUBJECTIVE AND OBJECTIVE BOX
Patient is a 74y old  Male who presents with a chief complaint of Urinary Retention (16 Oct 2019 17:48)  Currently admitted to medicine with a primary diagnosis of urinary retention likely 2/2 clots 2/2 bladder CA. HD#2.    OVERNIGHT EVENTS: No overnight events.    This morning patient is resting comfortably in bed. Palomo bag continues to drain grossly bloody urine. Patient scheduled for cystoscopy and fulguration today. Patient denies any chest pain, shortness of breath, N/V/C/D, pain around the Palomo site, or back pain.     SUBJECTIVE / INTERVAL HPI: Patient seen and examined at bedside.     VITAL SIGNS:  Vital Signs Last 24 Hrs  T(C): 36.7 (17 Oct 2019 07:47), Max: 36.8 (16 Oct 2019 23:52)  T(F): 98 (17 Oct 2019 07:47), Max: 98.2 (16 Oct 2019 23:52)  HR: 68 (17 Oct 2019 07:47) (68 - 76)  BP: 158/72 (17 Oct 2019 07:47) (134/69 - 158/72)  BP(mean): --  RR: 18 (17 Oct 2019 07:47) (18 - 20)  SpO2: 97% (17 Oct 2019 08:14) (97% - 97%)    PHYSICAL EXAM:    General: WDWN  HEENT: NC/AT; PERRL, clear conjunctiva  Neck: supple  Cardiovascular: +S1/S2; RRR  Respiratory: CTA b/l; no W/R/R  Gastrointestinal: soft, NT/ND; +BSx4  Extremities: WWP; 2+ peripheral pulses; no edema   Neurological: AAOx3; no focal deficits    MEDICATIONS:  MEDICATIONS  (STANDING):  atorvastatin Oral Tab/Cap - Peds 10 milliGRAM(s) Oral daily  chlorhexidine 4% Liquid 1 Application(s) Topical <User Schedule>  dextrose 5% + sodium chloride 0.9%. 1000 milliLiter(s) (75 mL/Hr) IV Continuous <Continuous>  dibucaine 1% Ointment 1 Application(s) Topical two times a day  finasteride 5 milliGRAM(s) Oral daily  hydrochlorothiazide 25 milliGRAM(s) Oral daily  influenza   Vaccine 0.5 milliLiter(s) IntraMuscular once  lisinopril 10 milliGRAM(s) Oral daily  meropenem  IVPB 1000 milliGRAM(s) IV Intermittent every 8 hours  pantoprazole    Tablet 40 milliGRAM(s) Oral before breakfast  ranolazine 500 milliGRAM(s) Oral two times a day  sodium chloride 0.9% lock flush 3 milliLiter(s) IV Push every 8 hours  tamsulosin 0.4 milliGRAM(s) Oral at bedtime    MEDICATIONS  (PRN):    ALLERGIES:  Allergies    No Known Allergies    Intolerances    LABS:                        10.3   6.57  )-----------( 203      ( 17 Oct 2019 06:32 )             30.9     10-17    140  |  104  |  15  ----------------------------<  179<H>  3.9   |  23  |  1.0    Ca    9.1      17 Oct 2019 06:32  Mg     1.8     10-17    TPro  6.4  /  Alb  3.6  /  TBili  0.5  /  DBili  x   /  AST  14  /  ALT  10  /  AlkPhos  94  10-17    PT/INR - ( 17 Oct 2019 06:32 )   PT: 11.60 sec;   INR: 1.01 ratio         PTT - ( 17 Oct 2019 06:32 )  PTT:28.4 sec    CAPILLARY BLOOD GLUCOSE    POCT Blood Glucose.: 191 mg/dL (17 Oct 2019 08:09)    RADIOLOGY & ADDITIONAL TESTS: Reviewed.    ASSESSMENT: Pt is a 73y/o M with PMHx of CAD s/p stents several years ago, HTN, HLD, DMII, Prostate cancer ( 2013) in remission,  TURBT for small bladder base tumor (July 2019), path showing high grade cancer with extensive invasion into deep smooth muscle/muscularis propria, focal squamous metaplasia, glandular differentiation, seen by Dr. Butterfield 10/9/19, was planning for a cystoscopy + fulguration admitted for urinary retention due to blood clots in the bladder    PLAN:   # Urinary retention due to blood clots in the bladder  - S/p irrigation of the palomo with 1 L SW-> blood tinged urine  - S/p 2 PRBC for anemia (Hb drop from 12 to 8), keep active T&S  - OR today for cystoscopy and fulguration of bladder   - Monitor H/H; keep Hgb > 7.5. This morning Hgb = 10.3. F/u afternoon (16:00) CBC  - Irrigate PRN, or Q4-6H   - Hold Plavix 2/2 active hematuria. F/u post-procedure A/C plan as per cardio    # Positive UA = "cystitis-equivalent" in the setting of planned urological instrumentation  - Hx of Enterococcus avium multi-sensitive in the urine (05/19)  - C/w Meropenem 1g q8h IV for now, f/u cultures to adjust dosing    # CAD s/p stents/ Recent Nuclear Stress testing (2018) was negative for ischemia  - Patient states that he's on plavix and had stents placed several years ago; not on aspirin.  - Hold Plavix for now. Patient's cardiologist is not at [facility]. Patient is still actively hematuric. Will f/u A/C plan after cystoscopy/fulguration today.  - Patient on atorvastatin 10 mg (not high potency statin)  - Not on BB    # CKD III - stable:  - Creatinine at baseline (baseline ~1.3). Cr = 1.0 today (10/17) <-- 1.1  - c/w lisinopril and HCTZ  - Daily BMP    # HLD  - C/w atorvastatin 10 mg PO qd    # DMII:  - Hold PO anti-diabetics  - Monitor FS qac/hs and start insulin as needed    # CAD  - Patient on nitroglycerin sublingual and ranexa.  - Need to have cardiology Follow up as outpatient     # Diet: Carb consistent DASH  # GI ppx: Protonix 40 mg PO qd  # DVT ppx: SCD  # Activity: ambulate as tolerated  # Dispo: From home  # Code status: FULL Patient is a 74y old  Male who presents with a chief complaint of Urinary Retention (16 Oct 2019 17:48)  Currently admitted to medicine with a primary diagnosis of urinary retention likely 2/2 clots 2/2 bladder CA. HD#2.    OVERNIGHT EVENTS: No overnight events.    This morning patient is resting comfortably in bed. Palomo bag continues to drain grossly bloody urine. Patient scheduled for cystoscopy and fulguration today. Patient denies any chest pain, shortness of breath, N/V/C/D, pain around the Palomo site, or back pain.     SUBJECTIVE / INTERVAL HPI: Patient seen and examined at bedside.     VITAL SIGNS:  Vital Signs Last 24 Hrs  T(C): 36.7 (17 Oct 2019 07:47), Max: 36.8 (16 Oct 2019 23:52)  T(F): 98 (17 Oct 2019 07:47), Max: 98.2 (16 Oct 2019 23:52)  HR: 68 (17 Oct 2019 07:47) (68 - 76)  BP: 158/72 (17 Oct 2019 07:47) (134/69 - 158/72)  BP(mean): --  RR: 18 (17 Oct 2019 07:47) (18 - 20)  SpO2: 97% (17 Oct 2019 08:14) (97% - 97%)    PHYSICAL EXAM:    General: WDWN  HEENT: NC/AT; PERRL, clear conjunctiva  Neck: supple  Cardiovascular: +S1/S2; RRR  Respiratory: CTA b/l; no W/R/R  Gastrointestinal: soft, NT/ND; +BS  Extremities: WWP; 2+ peripheral pulses; no edema   Neurological: AAOx3; no focal deficits    MEDICATIONS:  MEDICATIONS  (STANDING):  atorvastatin Oral Tab/Cap - Peds 10 milliGRAM(s) Oral daily  chlorhexidine 4% Liquid 1 Application(s) Topical <User Schedule>  dextrose 5% + sodium chloride 0.9%. 1000 milliLiter(s) (75 mL/Hr) IV Continuous <Continuous>  dibucaine 1% Ointment 1 Application(s) Topical two times a day  finasteride 5 milliGRAM(s) Oral daily  hydrochlorothiazide 25 milliGRAM(s) Oral daily  influenza   Vaccine 0.5 milliLiter(s) IntraMuscular once  lisinopril 10 milliGRAM(s) Oral daily  meropenem  IVPB 1000 milliGRAM(s) IV Intermittent every 8 hours  pantoprazole    Tablet 40 milliGRAM(s) Oral before breakfast  ranolazine 500 milliGRAM(s) Oral two times a day  sodium chloride 0.9% lock flush 3 milliLiter(s) IV Push every 8 hours  tamsulosin 0.4 milliGRAM(s) Oral at bedtime    MEDICATIONS  (PRN):    ALLERGIES:  Allergies    No Known Allergies    Intolerances    LABS:                        10.3   6.57  )-----------( 203      ( 17 Oct 2019 06:32 )             30.9     10-17    140  |  104  |  15  ----------------------------<  179<H>  3.9   |  23  |  1.0    Ca    9.1      17 Oct 2019 06:32  Mg     1.8     10-17    TPro  6.4  /  Alb  3.6  /  TBili  0.5  /  DBili  x   /  AST  14  /  ALT  10  /  AlkPhos  94  10-17    PT/INR - ( 17 Oct 2019 06:32 )   PT: 11.60 sec;   INR: 1.01 ratio         PTT - ( 17 Oct 2019 06:32 )  PTT:28.4 sec    CAPILLARY BLOOD GLUCOSE    POCT Blood Glucose.: 191 mg/dL (17 Oct 2019 08:09)    RADIOLOGY & ADDITIONAL TESTS: Reviewed.    ASSESSMENT: Pt is a 75y/o M with PMHx of CAD s/p stents several years ago, HTN, HLD, DMII, Prostate cancer ( 2013) in remission,  TURBT for small bladder base tumor (July 2019), path showing high grade cancer with extensive invasion into deep smooth muscle/muscularis propria, focal squamous metaplasia, glandular differentiation, seen by Dr. Butterfield 10/9/19, was planning for a cystoscopy + fulguration admitted for urinary retention due to blood clots in the bladder    PLAN:   # Urinary retention due to blood clots in the bladder  - S/p irrigation of the palomo with 1 L SW-> blood tinged urine  - S/p 2 PRBC for anemia (Hb drop from 12 to 8), keep active T&S  - OR today for cystoscopy and fulguration of bladder   - Monitor H/H; keep Hgb > 7.5. This morning Hgb = 10.3. F/u afternoon (16:00) CBC  - Irrigate PRN, or Q4-6H   - Hold Plavix 2/2 active hematuria. F/u post-procedure A/C plan as per cardio    # Positive UA = "cystitis-equivalent" in the setting of planned urological instrumentation  - Hx of Enterococcus avium multi-sensitive in the urine (05/19)  - C/w Meropenem 1g q8h IV for now, f/u cultures to adjust dosing    # CAD s/p stents/ Recent Nuclear Stress testing (2018) was negative for ischemia  - Patient states that he's on plavix and had stents placed several years ago; not on aspirin.  - Hold Plavix for now. Patient's cardiologist is not at [facility]. Patient is still actively hematuric. Will f/u A/C plan after cystoscopy/fulguration today.  - Patient on atorvastatin 10 mg (not high potency statin)  - Not on BB    # CKD III - stable:  - Creatinine at baseline (baseline ~1.3). Cr = 1.0 today (10/17) <-- 1.1  - c/w lisinopril and HCTZ  - Daily BMP    # HLD  - C/w atorvastatin 10 mg PO qd    # DMII:  - Hold PO anti-diabetics  - Monitor FS qac/hs and start insulin as needed    # CAD  - Patient on nitroglycerin sublingual and ranexa.  - Need to have cardiology Follow up as outpatient     # Diet: Carb consistent DASH  # GI ppx: Protonix 40 mg PO qd  # DVT ppx: SCD  # Activity: ambulate as tolerated  # Dispo: From home  # Code status: FULL

## 2019-10-17 NOTE — PROGRESS NOTE ADULT - SUBJECTIVE AND OBJECTIVE BOX
73 y/o male Pt. seen and examined.  High grade prostate cancer vs bladder cancer with hematuria with inital clot retention, Talamantes catheter irrigated with 500 cc of sterile water. Afebrile.  for cysto with fulguration of bleeding areas today. plavix on hold    Vital Signs Last 24 Hrs  Vital Signs Last 24 Hrs  T(C): 36.7 (17 Oct 2019 07:47), Max: 36.8 (16 Oct 2019 23:52)  T(F): 98 (17 Oct 2019 07:47), Max: 98.2 (16 Oct 2019 23:52)  HR: 68 (17 Oct 2019 07:47) (68 - 76)  BP: 158/72 (17 Oct 2019 07:47) (136/76 - 158/72)  BP(mean): --  RR: 18 (17 Oct 2019 07:47) (18 - 20)  SpO2: 97% (17 Oct 2019 08:14) (97% - 97%)     MEDICATIONS  (STANDING):  atorvastatin Oral Tab/Cap - Peds 10 milliGRAM(s) Oral daily  chlorhexidine 4% Liquid 1 Application(s) Topical <User Schedule>  finasteride 5 milliGRAM(s) Oral daily  hydrochlorothiazide 25 milliGRAM(s) Oral daily  influenza   Vaccine 0.5 milliLiter(s) IntraMuscular once  lisinopril 10 milliGRAM(s) Oral daily  meropenem  IVPB 1000 milliGRAM(s) IV Intermittent every 8 hours  pantoprazole    Tablet 40 milliGRAM(s) Oral before breakfast  ranolazine 500 milliGRAM(s) Oral two times a day  sodium chloride 0.9% lock flush 3 milliLiter(s) IV Push every 8 hours  tamsulosin 0.4 milliGRAM(s) Oral at bedtime       PE:   General: WN/WD in NAD  HEENT: NC/AT, EOMI   Abdomen soft, ND/NT, palpable bladder   : + 3 way  20 Fr Talamantes catheter in place drains Bloody urine - starting to clearing up in the Talamantes catheter.   Extremities: LOPEZ x4   LABS:                                   10.3   6.57  )-----------( 203      ( 17 Oct 2019 06:32 )             30.9       10-17    140  |  104  |  15  ----------------------------<  179<H>  3.9   |  23  |  1.0    Ca    9.1      17 Oct 2019 06:32  Mg     1.8     10-17    TPro  6.4  /  Alb  3.6  /  TBili  0.5  /  DBili  x   /  AST  14  /  ALT  10  /  AlkPhos  94  10-17      < from: 12 Lead ECG (10.15.19 @ 11:05) >    Ventricular Rate 75 BPM    Atrial Rate 75 BPM    P-R Interval 220 ms    QRS Duration 96 ms    Q-T Interval 424 ms    QTC Calculation(Bezet) 473 ms    P Axis 106 degrees    R Axis 3 degrees    T Axis 38 degrees    Diagnosis Line Sinus rhythm with 1st degree A-V block  Otherwise normal ECG    Confirmed by LAURA RODRIGEZ MD ([id number]) on 10/15/2019 12:11:07 PM    < end of copied text >    RADIOLOGY & ADDITIONAL STUDIES:     < from: US Kidney and Bladder (10.16.19 @ 09:18) >    EXAM:  US KIDNEYS AND BLADDER            PROCEDURE DATE:  10/16/2019            INTERPRETATION:  CLINICAL HISTORY: Hematuria. History of bladder cancer    COMPARISON: None.    PROCEDURE: Retroperitoneal Ultrasound was performed.    FINDINGS:    RIGHT KIDNEY: Normal in echogenicity, size measuring 11.0 cm in length.   No evidence of calculus or hydronephrosis. 3.2 cm upper pole cyst.    LEFT KIDNEY: Normal in echogenicity, size measuring 11.6 cm in length.   Cortical scarring is noted. No evidence of calculus or hydronephrosis.    IMPRESSION:  No hydronephrosis.        MARQUIS ALVARADO M.D., ATTENDING RADIOLOGIST  This document has been electronically signed. Oct 16 2019  9:42AM    < end of copied text >

## 2019-10-17 NOTE — PROGRESS NOTE ADULT - ASSESSMENT
74 y.o M with PMH of muscle invasive bladder Ca   1. gross  hematuria,  2.  acute  blood loss anemia s/p blood transfusion     3. For OR today for fulguration of bleeding and evaucation of blood clots    pt ready to proceed all questions answered

## 2019-10-18 ENCOUNTER — TRANSCRIPTION ENCOUNTER (OUTPATIENT)
Age: 74
End: 2019-10-18

## 2019-10-18 VITALS
RESPIRATION RATE: 18 BRPM | HEART RATE: 85 BPM | TEMPERATURE: 97 F | DIASTOLIC BLOOD PRESSURE: 61 MMHG | SYSTOLIC BLOOD PRESSURE: 120 MMHG

## 2019-10-18 LAB
ALBUMIN SERPL ELPH-MCNC: 3.6 G/DL — SIGNIFICANT CHANGE UP (ref 3.5–5.2)
ALP SERPL-CCNC: 94 U/L — SIGNIFICANT CHANGE UP (ref 30–115)
ALT FLD-CCNC: 10 U/L — SIGNIFICANT CHANGE UP (ref 0–41)
ANION GAP SERPL CALC-SCNC: 14 MMOL/L — SIGNIFICANT CHANGE UP (ref 7–14)
AST SERPL-CCNC: 21 U/L — SIGNIFICANT CHANGE UP (ref 0–41)
BILIRUB SERPL-MCNC: 0.4 MG/DL — SIGNIFICANT CHANGE UP (ref 0.2–1.2)
BUN SERPL-MCNC: 12 MG/DL — SIGNIFICANT CHANGE UP (ref 10–20)
CALCIUM SERPL-MCNC: 9 MG/DL — SIGNIFICANT CHANGE UP (ref 8.5–10.1)
CHLORIDE SERPL-SCNC: 103 MMOL/L — SIGNIFICANT CHANGE UP (ref 98–110)
CO2 SERPL-SCNC: 22 MMOL/L — SIGNIFICANT CHANGE UP (ref 17–32)
CREAT SERPL-MCNC: 1 MG/DL — SIGNIFICANT CHANGE UP (ref 0.7–1.5)
GLUCOSE BLDC GLUCOMTR-MCNC: 161 MG/DL — HIGH (ref 70–99)
GLUCOSE BLDC GLUCOMTR-MCNC: 197 MG/DL — HIGH (ref 70–99)
GLUCOSE BLDC GLUCOMTR-MCNC: 272 MG/DL — HIGH (ref 70–99)
GLUCOSE SERPL-MCNC: 192 MG/DL — HIGH (ref 70–99)
HCT VFR BLD CALC: 31 % — LOW (ref 42–52)
HGB BLD-MCNC: 10.3 G/DL — LOW (ref 14–18)
MCHC RBC-ENTMCNC: 31.3 PG — HIGH (ref 27–31)
MCHC RBC-ENTMCNC: 33.2 G/DL — SIGNIFICANT CHANGE UP (ref 32–37)
MCV RBC AUTO: 94.2 FL — HIGH (ref 80–94)
NRBC # BLD: 0 /100 WBCS — SIGNIFICANT CHANGE UP (ref 0–0)
PLATELET # BLD AUTO: 191 K/UL — SIGNIFICANT CHANGE UP (ref 130–400)
POTASSIUM SERPL-MCNC: 4.2 MMOL/L — SIGNIFICANT CHANGE UP (ref 3.5–5)
POTASSIUM SERPL-SCNC: 4.2 MMOL/L — SIGNIFICANT CHANGE UP (ref 3.5–5)
PROT SERPL-MCNC: 6.2 G/DL — SIGNIFICANT CHANGE UP (ref 6–8)
RBC # BLD: 3.29 M/UL — LOW (ref 4.7–6.1)
RBC # FLD: 14.2 % — SIGNIFICANT CHANGE UP (ref 11.5–14.5)
SODIUM SERPL-SCNC: 139 MMOL/L — SIGNIFICANT CHANGE UP (ref 135–146)
WBC # BLD: 7.4 K/UL — SIGNIFICANT CHANGE UP (ref 4.8–10.8)
WBC # FLD AUTO: 7.4 K/UL — SIGNIFICANT CHANGE UP (ref 4.8–10.8)

## 2019-10-18 PROCEDURE — 99233 SBSQ HOSP IP/OBS HIGH 50: CPT

## 2019-10-18 RX ORDER — TAMSULOSIN HYDROCHLORIDE 0.4 MG/1
1 CAPSULE ORAL
Qty: 30 | Refills: 0
Start: 2019-10-18 | End: 2019-11-16

## 2019-10-18 RX ORDER — DOCUSATE SODIUM 100 MG
100 CAPSULE ORAL THREE TIMES A DAY
Refills: 0 | Status: DISCONTINUED | OUTPATIENT
Start: 2019-10-18 | End: 2019-10-18

## 2019-10-18 RX ORDER — POLYETHYLENE GLYCOL 3350 17 G/17G
17 POWDER, FOR SOLUTION ORAL DAILY
Refills: 0 | Status: DISCONTINUED | OUTPATIENT
Start: 2019-10-18 | End: 2019-10-18

## 2019-10-18 RX ORDER — SENNA PLUS 8.6 MG/1
2 TABLET ORAL ONCE
Refills: 0 | Status: COMPLETED | OUTPATIENT
Start: 2019-10-18 | End: 2019-10-18

## 2019-10-18 RX ORDER — ACETAMINOPHEN 500 MG
650 TABLET ORAL ONCE
Refills: 0 | Status: COMPLETED | OUTPATIENT
Start: 2019-10-18 | End: 2019-10-18

## 2019-10-18 RX ORDER — SENNA PLUS 8.6 MG/1
2 TABLET ORAL AT BEDTIME
Refills: 0 | Status: DISCONTINUED | OUTPATIENT
Start: 2019-10-18 | End: 2019-10-18

## 2019-10-18 RX ADMIN — CHLORHEXIDINE GLUCONATE 1 APPLICATION(S): 213 SOLUTION TOPICAL at 05:20

## 2019-10-18 RX ADMIN — Medication 25 MILLIGRAM(S): at 06:04

## 2019-10-18 RX ADMIN — POLYETHYLENE GLYCOL 3350 17 GRAM(S): 17 POWDER, FOR SOLUTION ORAL at 11:38

## 2019-10-18 RX ADMIN — MEROPENEM 100 MILLIGRAM(S): 1 INJECTION INTRAVENOUS at 06:01

## 2019-10-18 RX ADMIN — SENNA PLUS 2 TABLET(S): 8.6 TABLET ORAL at 13:48

## 2019-10-18 RX ADMIN — LISINOPRIL 10 MILLIGRAM(S): 2.5 TABLET ORAL at 06:04

## 2019-10-18 RX ADMIN — Medication 100 MILLIGRAM(S): at 13:47

## 2019-10-18 RX ADMIN — FINASTERIDE 5 MILLIGRAM(S): 5 TABLET, FILM COATED ORAL at 11:10

## 2019-10-18 RX ADMIN — RANOLAZINE 500 MILLIGRAM(S): 500 TABLET, FILM COATED, EXTENDED RELEASE ORAL at 06:04

## 2019-10-18 RX ADMIN — SODIUM CHLORIDE 3 MILLILITER(S): 9 INJECTION INTRAMUSCULAR; INTRAVENOUS; SUBCUTANEOUS at 05:20

## 2019-10-18 RX ADMIN — Medication 650 MILLIGRAM(S): at 06:07

## 2019-10-18 RX ADMIN — ATORVASTATIN CALCIUM 10 MILLIGRAM(S): 80 TABLET, FILM COATED ORAL at 11:10

## 2019-10-18 RX ADMIN — MEROPENEM 100 MILLIGRAM(S): 1 INJECTION INTRAVENOUS at 13:47

## 2019-10-18 RX ADMIN — Medication 1 APPLICATION(S): at 05:55

## 2019-10-18 RX ADMIN — SODIUM CHLORIDE 3 MILLILITER(S): 9 INJECTION INTRAMUSCULAR; INTRAVENOUS; SUBCUTANEOUS at 13:50

## 2019-10-18 RX ADMIN — PANTOPRAZOLE SODIUM 40 MILLIGRAM(S): 20 TABLET, DELAYED RELEASE ORAL at 06:04

## 2019-10-18 NOTE — PROGRESS NOTE ADULT - ASSESSMENT
Pt is a 75y/o M with PMHx of CAD s/p stents several years ago, HTN, HLD, DMII, Prostate cancer ( 2013) in remission,  TURBT for small bladder base tumor (July 2019), path showing high grade cancer with extensive invasion into deep smooth muscle/muscularis propria, focal squamous metaplasia, glandular differentiation, seen by Dr. Butterfield 10/9/19, pt underwent  cystoscopy while in the hospital on 10/17/19.    A/P     # Urinary retention/ gross hematuria/ h/o Bladder CA / Acute blood loss anemia   - S/p irrigation of the palomo with 1 L SW-> blood tinged urine  - S/p 2 PRBC for anemia (Hb drop from 12 to 8), keep active T&S  - pt underwent cystoscopy and fulguration of bladder on 10/17/19  - Monitor H/H , stable for last 48 hours   -  following, pt passed TOV    - c/w Flomax     # Positive UA,  - Hx of Enterococcus avium multisensitive in the urine (05/19)  - was on  Meropenem 1g q8h IV before cystoscopy and day after, no indications for Abx on discharge     # CAD s/p stents  - Recent Nuclear Stress testing (2018) was negative for ischemia  - Patient states that he's on plavix and had stents placed several years ago, not on aspirin   - resume Plavix on discharge   - c/w atorvastatin 10 mg ( not high potency statin)  - Not on BB  - c/w Ranexa     # CKD III   - stable  - Creatinine at baseline ( around 1.3)  - c/w lisinopril and HCTZ  - Daily BMP    # HLD  - c/w atorvastatin 10 mg PO qd    # DMII:  - carb consistent diet   - monitor finger stick   - resume home meds on discharge         # Diet: Carb consistent DASH  # GI ppx: Protonix 40 mg PO qd  # DVT ppx: SCD  # Activity: ambulate as tolerated  # Dispo: pt passed TOV, stable for discharge home today with PMD and urology follow up.

## 2019-10-18 NOTE — PROGRESS NOTE ADULT - SUBJECTIVE AND OBJECTIVE BOX
74y old Male who presents with a chief complaint of Urinary Retention and hematuria. He has a h/o bladder CA, consulted by , underwent cystoscopy on 10/17/19.   Today he feels OK, denies any complaints, passed TOV.     PAST MEDICAL & SURGICAL HISTORY  Bladder cancer  CAD, multiple vessel  Anemia  PVD (peripheral vascular disease)  Diabetes mellitus: type 2  Hypertension  Prostate CA: with radiation  S/P coronary artery stent placement  H/O hernia repair    SOCIAL HISTORY:  Negative for smoking/alcohol/drug use.     ALLERGIES:  No Known Allergies    VITALS:   T(C): 36.2 (18 Oct 2019 05:52), Max: 36.6 (17 Oct 2019 15:42)  T(F): 97.1 (18 Oct 2019 05:52), Max: 97.8 (17 Oct 2019 15:42)  HR: 69 (18 Oct 2019 05:52) (68 - 89)  BP: 132/63 (18 Oct 2019 05:52) (124/61 - 158/76)  BP(mean): --  RR: 18 (18 Oct 2019 05:52) (18 - 95)  SpO2: 97% (18 Oct 2019 10:26) (97% - 99%)    PHYSICAL EXAM:  GEN: No acute distress, NC/AT  LUNGS: Ctab, no wheezes  HEART: S1/S2 present. RRR. No murmurs  ABD: Soft, non-tender, non-distended. Bowel+  EXT: No LE edema. Right buttock pressure ulcer   NEURO: AAOX3, moves all extremities, no focal deficits    LABS:                                   10.3   7.40  )-----------( 191      ( 18 Oct 2019 07:26 )             31.0   10-18    139  |  103  |  12  ----------------------------<  192<H>  4.2   |  22  |  1.0    Ca    9.0      18 Oct 2019 07:26  Mg     1.8     10-17    TPro  6.2  /  Alb  3.6  /  TBili  0.4  /  DBili  x   /  AST  21  /  ALT  10  /  AlkPhos  94  10-18   PTT - ( 16 Oct 2019 07:34 )  PTT:29.0 sec  Urinalysis Basic - ( 15 Oct 2019 04:00 )    Color: Dark Red / Appearance: Turbid / S.031 / pH: x  Gluc: x / Ketone: Negative  / Bili: Negative / Urobili: <2 mg/dL   Blood: x / Protein: 100 mg/dL / Nitrite: Positive   Leuk Esterase: Small / RBC: >720 /HPF / WBC 29 /HPF   Sq Epi: x / Non Sq Epi: 8 /HPF / Bacteria: Negative      Culture - Urine (collected 15 Oct 2019 04:00)  Source: .Urine Catheterized  Final Report (16 Oct 2019 10:36):    >=3 organisms. Probable collection contamination.    CARDIAC MARKERS ( 15 Oct 2019 17:34 )  x     / <0.01 ng/mL / x     / x     / x      10-15-19 @ 07:01  -  10-16-19 @ 07:00  --------------------------------------------------------  IN: 0 mL / OUT: 1600 mL / NET: -1600 mL    10-16-19 @ 07:01  -  10-16-19 @ 17:48  --------------------------------------------------------  IN: 0 mL / OUT: 1250 mL / NET: -1250 mL    Radiology:  < from: Xray Chest 1 View- PORTABLE-Routine (10.17.19 @ 08:55) >  Impression:      No radiographic evidence of acute cardiopulmonary disease.    < from: US Kidney and Bladder (10.16.19 @ 09:18) >  IMPRESSION:  No hydronephrosis.    MEDICATIONS  (STANDING):  atorvastatin Oral Tab/Cap - Peds 10 milliGRAM(s) Oral daily  chlorhexidine 4% Liquid 1 Application(s) Topical <User Schedule>  dextrose 5% + sodium chloride 0.9%. 1000 milliLiter(s) (75 mL/Hr) IV Continuous <Continuous>  dibucaine 1% Ointment 1 Application(s) Topical two times a day  docusate sodium 100 milliGRAM(s) Oral three times a day  finasteride 5 milliGRAM(s) Oral daily  hydrochlorothiazide 25 milliGRAM(s) Oral daily  influenza   Vaccine 0.5 milliLiter(s) IntraMuscular once  lisinopril 10 milliGRAM(s) Oral daily  meropenem  IVPB 1000 milliGRAM(s) IV Intermittent every 8 hours  pantoprazole    Tablet 40 milliGRAM(s) Oral before breakfast  ranolazine 500 milliGRAM(s) Oral two times a day  sodium chloride 0.9% lock flush 3 milliLiter(s) IV Push every 8 hours  tamsulosin 0.4 milliGRAM(s) Oral at bedtime    MEDICATIONS  (PRN):  polyethylene glycol 3350 17 Gram(s) Oral daily PRN Constipation

## 2019-10-18 NOTE — PROGRESS NOTE ADULT - SUBJECTIVE AND OBJECTIVE BOX
Pt. seen and examined at bedside in NAD, Talamantes catheter in place drains yellow urine. Afebrile     Vital Signs Last 24 Hrs  T(C): 36.2 (18 Oct 2019 05:52), Max: 36.6 (17 Oct 2019 15:42)  T(F): 97.1 (18 Oct 2019 05:52), Max: 97.8 (17 Oct 2019 15:42)  HR: 69 (18 Oct 2019 05:52) (68 - 89)  BP: 132/63 (18 Oct 2019 05:52) (124/61 - 158/76)  RR: 18 (18 Oct 2019 05:52) (18 - 95)  SpO2: 97% (18 Oct 2019 10:26) (97% - 99%)    MEDICATIONS  (STANDING):  atorvastatin Oral Tab/Cap - Peds 10 milliGRAM(s) Oral daily  chlorhexidine 4% Liquid 1 Application(s) Topical <User Schedule>  dextrose 5% + sodium chloride 0.9%. 1000 milliLiter(s) (75 mL/Hr) IV Continuous <Continuous>  dibucaine 1% Ointment 1 Application(s) Topical two times a day  docusate sodium 100 milliGRAM(s) Oral three times a day  finasteride 5 milliGRAM(s) Oral daily  hydrochlorothiazide 25 milliGRAM(s) Oral daily  influenza   Vaccine 0.5 milliLiter(s) IntraMuscular once  lisinopril 10 milliGRAM(s) Oral daily  meropenem  IVPB 1000 milliGRAM(s) IV Intermittent every 8 hours  pantoprazole    Tablet 40 milliGRAM(s) Oral before breakfast  ranolazine 500 milliGRAM(s) Oral two times a day  senna 2 Tablet(s) Oral at bedtime  sodium chloride 0.9% lock flush 3 milliLiter(s) IV Push every 8 hours  tamsulosin 0.4 milliGRAM(s) Oral at bedtime    MEDICATIONS  (PRN):  polyethylene glycol 3350 17 Gram(s) Oral daily PRN Constipation       PE: General: WN/WD in NAD  HEENT; NC/AT, EOMI  Resp: No accessory muscle use  Abd: soft NT, ND, bladder not palpable   Extremities: LOPEZ x 4     I&O's Detail    17 Oct 2019 07:01  -  18 Oct 2019 07:00  --------------------------------------------------------  IN:    dextrose 5% + sodium chloride 0.9%.: 300 mL  Total IN: 300 mL    OUT:    Indwelling Catheter - Urethral: 2700 mL  Total OUT: 2700 mL    Total NET: -2400 mL        LABS:                        10.3   7.40  )-----------( 191      ( 18 Oct 2019 07:26 )             31.0     10-18    139  |  103  |  12  ----------------------------<  192<H>  4.2   |  22  |  1.0    Ca    9.0      18 Oct 2019 07:26  Mg     1.8     10-17    TPro  6.2  /  Alb  3.6  /  TBili  0.4  /  DBili  x   /  AST  21  /  ALT  10  /  AlkPhos  94  10-18    Culture - Urine (10.15.19 @ 04:00)    Specimen Source: .Urine Catheterized    Culture Results:   >=3 organisms. Probable collection contamination.

## 2019-10-18 NOTE — PROGRESS NOTE ADULT - REASON FOR ADMISSION
Urinary Retention

## 2019-10-18 NOTE — PROGRESS NOTE ADULT - ASSESSMENT
74 y.o M  POD#1 s/p Cysto bladder fulguration with clot evacuation, doing well. Fol;ey catheter with clear yellow urine output  s/p Cystoscopy, TURBT  7/30/19 x Bladder CA    Plan:   D/C Talamantes catheter, please make sure Talamantes catheter balloon completely deflated before removing (might have more than 10 cc in the balloon)   TOV  Strict I&O   F/U with Bladder Scan ( by RN) to evaluate Post-void residual after each void. If Bladder Scan with PVR  more than 300 cc and Failed TOV will need Talamantes catheter placement before D/C home.  Cont. Finasteride and Tamsulosin   F/U with Dr. Butterfield as an outpatient in 2 weeks

## 2019-10-18 NOTE — DISCHARGE NOTE NURSING/CASE MANAGEMENT/SOCIAL WORK - PATIENT PORTAL LINK FT
You can access the FollowMyHealth Patient Portal offered by [facility] by registering at the following website: [email or web address]. By joining [facility]’s FollowMyHealth portal, you will also be able to view your health information using other applications (apps) compatible with our system.

## 2019-10-18 NOTE — DISCHARGE NOTE PROVIDER - NSDCCPCAREPLAN_GEN_ALL_CORE_FT
PRINCIPAL DISCHARGE DIAGNOSIS  Diagnosis: Hematuria  Assessment and Plan of Treatment: This is due to your bladder cancer. Please follow up with your urologist in two weeks. If your symptoms recur please seek medical attention.      SECONDARY DISCHARGE DIAGNOSES  Diagnosis: Urinary obstruction  Assessment and Plan of Treatment: You had uriniary retention due to blood clots in your bladder. This was flushed out. You will need to follow up with your Urologist in two weeks.Follow up with your primary care docotor in one week. If your symptoms recur please come to the emergency department. Continue to take all medications as prescribed.    Diagnosis: Prostate CA  Assessment and Plan of Treatment: with radiation    Diagnosis: Anemia  Assessment and Plan of Treatment:

## 2019-10-18 NOTE — DISCHARGE NOTE PROVIDER - PROVIDER TOKENS
PROVIDER:[TOKEN:[[id number]],FOLLOWUP:[2 weeks]],PROVIDER:[TOKEN:[[id number]],FOLLOWUP:[1 week]]

## 2019-10-18 NOTE — DISCHARGE NOTE PROVIDER - CARE PROVIDERS DIRECT ADDRESSES
[email or web address]

## 2019-10-18 NOTE — DISCHARGE NOTE PROVIDER - CARE PROVIDER_API CALL
Scar Butterfield)  [facility]  [address] [place] [address], [address]  [place] [address]  Phone: [phone]  Fax: [phone]  Follow Up Time: 2 weeks    Nikolay Guajardo)  [facility]  [address] [facility] [address]  [place] [address]  Phone: [phone]  Fax: [phone]  Follow Up Time: 1 week

## 2019-10-18 NOTE — DISCHARGE NOTE PROVIDER - HOSPITAL COURSE
PLAN:     # Urinary retention due to blood clots in the bladder    - S/p irrigation of the palomo with 1 L SW-> blood tinged urine    - S/p 2 PRBC for anemia (Hb drop from 12 to 8), keep active T&S    - OR for cystoscopy and fulguration of bladder     - Monitor H/H; keep Hgb > 7.5. This morning Hgb = 10.3. F/u afternoon (16:00) CBC    - Irrigate PRN, or Q4-6H     - Hold Plavix 2/2 active hematuria. 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    - Hold Plavix for now. Patient's cardiologist is not at [facility]. Patient is still actively hematuric. Will f/u A/C plan after cystoscopy/fulguration today.    - Patient on atorvastatin 10 mg (not high potency statin)    - Not on BB        # CKD III - stable:    - Creatinine at baseline (baseline ~1.3). Cr = 1.0 today (10/17) <-- 1.1    - c/w lisinopril and HCTZ    - Daily BMP        # HLD    - C/w atorvastatin 10 mg PO qd        # DMII:    - Hold PO anti-diabetics    - Monitor FS qac/hs and start insulin as needed        # CAD    - Patient on nitroglycerin sublingual and ranexa.    - Need to have cardiology Follow up as outpatient             Patient had smooth intraoperative event with cystoscopy and fulguration. Patient had successful trial of void with 29 cc post void residual volume.

## 2019-10-18 NOTE — CHART NOTE - NSCHARTNOTEFT_GEN_A_CORE
<<<RESIDENT DISCHARGE NOTE>>>     EMIGDIO RUBI  MRN-[id number]    VITAL SIGNS:  T(F): 97.1 (10-18-19 @ 05:52), Max: 97.8 (10-17-19 @ 15:42)  HR: 69 (10-18-19 @ 05:52)  BP: 132/63 (10-18-19 @ 05:52)  SpO2: 97% (10-18-19 @ 10:26)      PHYSICAL EXAMINATION:  General: no acute distress  Head & Neck: NC AT  Pulmonary:ctab, no wheezes  Cardiovascular: rrr, s1,s2, no rubs gallops or murmurs  Gastrointestinal/Abdomen & Pelvis:nttp, nd, bs+  Neurologic/Motor:A&Ox4 , no focal neurologic deficits     TEST RESULTS:                        10.3   7.40  )-----------( 191      ( 18 Oct 2019 07:26 )             31.0       10-18    139  |  103  |  12  ----------------------------<  192<H>  4.2   |  22  |  1.0    Ca    9.0      18 Oct 2019 07:26  Mg     1.8     10-17    TPro  6.2  /  Alb  3.6  /  TBili  0.4  /  DBili  x   /  AST  21  /  ALT  10  /  AlkPhos  94  10-18      FINAL DISCHARGE INTERVIEW:  Resident(s) Present: (Name:Samson Harding___________), RN Present: (Name:  ___Laura_______)    DISCHARGE MEDICATION RECONCILIATION  reviewed with Attending (Name:___Galo________)    DISPOSITION:   [  ] Home,    [  ] Home with Visiting Nursing Services,   [    ]  SNF/ NH,    [   ] Acute Rehab (4A),   [   ] Other (Specify:_________)

## 2019-10-22 DIAGNOSIS — D62 ACUTE POSTHEMORRHAGIC ANEMIA: ICD-10-CM

## 2019-10-22 DIAGNOSIS — C67.9 MALIGNANT NEOPLASM OF BLADDER, UNSPECIFIED: ICD-10-CM

## 2019-10-22 DIAGNOSIS — Z95.5 PRESENCE OF CORONARY ANGIOPLASTY IMPLANT AND GRAFT: ICD-10-CM

## 2019-10-22 DIAGNOSIS — Z87.891 PERSONAL HISTORY OF NICOTINE DEPENDENCE: ICD-10-CM

## 2019-10-22 DIAGNOSIS — I12.9 HYPERTENSIVE CHRONIC KIDNEY DISEASE WITH STAGE 1 THROUGH STAGE 4 CHRONIC KIDNEY DISEASE, OR UNSPECIFIED CHRONIC KIDNEY DISEASE: ICD-10-CM

## 2019-10-22 DIAGNOSIS — I25.10 ATHEROSCLEROTIC HEART DISEASE OF NATIVE CORONARY ARTERY WITHOUT ANGINA PECTORIS: ICD-10-CM

## 2019-10-22 DIAGNOSIS — R33.9 RETENTION OF URINE, UNSPECIFIED: ICD-10-CM

## 2019-10-22 DIAGNOSIS — N30.91 CYSTITIS, UNSPECIFIED WITH HEMATURIA: ICD-10-CM

## 2019-10-22 DIAGNOSIS — N32.89 OTHER SPECIFIED DISORDERS OF BLADDER: ICD-10-CM

## 2019-10-22 DIAGNOSIS — Z85.46 PERSONAL HISTORY OF MALIGNANT NEOPLASM OF PROSTATE: ICD-10-CM

## 2019-10-22 DIAGNOSIS — N13.9 OBSTRUCTIVE AND REFLUX UROPATHY, UNSPECIFIED: ICD-10-CM

## 2019-10-22 DIAGNOSIS — R31.9 HEMATURIA, UNSPECIFIED: ICD-10-CM

## 2019-10-22 DIAGNOSIS — N18.3 CHRONIC KIDNEY DISEASE, STAGE 3 (MODERATE): ICD-10-CM

## 2019-10-22 DIAGNOSIS — E11.22 TYPE 2 DIABETES MELLITUS WITH DIABETIC CHRONIC KIDNEY DISEASE: ICD-10-CM

## 2019-10-22 DIAGNOSIS — E78.5 HYPERLIPIDEMIA, UNSPECIFIED: ICD-10-CM

## 2019-10-22 LAB — SURGICAL PATHOLOGY STUDY: SIGNIFICANT CHANGE UP

## 2019-10-29 PROBLEM — C67.9 MALIGNANT NEOPLASM OF BLADDER, UNSPECIFIED: Chronic | Status: ACTIVE | Noted: 2019-10-15

## 2019-10-30 ENCOUNTER — APPOINTMENT (OUTPATIENT)
Dept: UROLOGY | Facility: CLINIC | Age: 74
End: 2019-10-30
Payer: MEDICARE

## 2019-10-30 VITALS — HEIGHT: 71 IN | BODY MASS INDEX: 28.42 KG/M2 | WEIGHT: 203 LBS

## 2019-10-30 PROCEDURE — 99213 OFFICE O/P EST LOW 20 MIN: CPT

## 2019-10-30 NOTE — ASSESSMENT
[FreeTextEntry1] : This is a 74 year male who had turbt for small bladder base tumor. pathology shows high grade cancer with extensive invasion into the deep smooth muscle/muscularis propia. focal squamous metaplasia, glandular differentiation\par currently feeling better\par has been following with oncology at [facility] [place]\par \par had to go to OR recently to fulgurate bleeding areas in bladder -- likely cancer\par \par no fevers or chills\par outside notes from Dr garcia show:\par Nov 2013-- prostate biopsy -- adenocarcinoma in multiple cores. -- up to 4 + 4 = 8 on the lateral mid and lateral base of the right\par Had negative Fish in Nov 2018, as well as negative cytology\par \par Aug 2019\par PSA Profile - Total = <0.1

## 2019-10-30 NOTE — HISTORY OF PRESENT ILLNESS
[FreeTextEntry1] : This is a 74 year male who had turbt for small bladder base tumor. pathology shows high grade cancer with extensive invasion into the deep smooth muscle/muscularis propia. focal squamous metaplasia, glandular differentiation\par currently feeling better\par has been following with oncology at [facility] [place]\par \par had to go to OR recently to fulgurate bleeding areas in bladder -- likely cancer\par \par no fevers or chills\par outside notes from Dr garcia show:\par Nov 2013-- prostate biopsy -- adenocarcinoma in multiple cores. -- up to 4 + 4 = 8 on the lateral mid and lateral base of the right\par Had negative Fish in Nov 2018, as well as negative cytology\par \par Aug 2019\par PSA Profile - Total = <0.1\par

## 2019-11-04 ENCOUNTER — APPOINTMENT (OUTPATIENT)
Dept: CARDIOLOGY | Facility: CLINIC | Age: 74
End: 2019-11-04

## 2019-11-08 ENCOUNTER — EMERGENCY (EMERGENCY)
Facility: HOSPITAL | Age: 74
LOS: 0 days | Discharge: HOME | End: 2019-11-08
Attending: EMERGENCY MEDICINE | Admitting: EMERGENCY MEDICINE
Payer: MEDICARE

## 2019-11-08 VITALS
HEART RATE: 110 BPM | OXYGEN SATURATION: 95 % | SYSTOLIC BLOOD PRESSURE: 210 MMHG | RESPIRATION RATE: 18 BRPM | TEMPERATURE: 98 F | DIASTOLIC BLOOD PRESSURE: 113 MMHG

## 2019-11-08 DIAGNOSIS — Z95.5 PRESENCE OF CORONARY ANGIOPLASTY IMPLANT AND GRAFT: Chronic | ICD-10-CM

## 2019-11-08 DIAGNOSIS — R33.9 RETENTION OF URINE, UNSPECIFIED: ICD-10-CM

## 2019-11-08 DIAGNOSIS — Z98.890 OTHER SPECIFIED POSTPROCEDURAL STATES: Chronic | ICD-10-CM

## 2019-11-08 LAB
ALBUMIN SERPL ELPH-MCNC: 3.8 G/DL — SIGNIFICANT CHANGE UP (ref 3.5–5.2)
ALP SERPL-CCNC: 116 U/L — HIGH (ref 30–115)
ALT FLD-CCNC: 14 U/L — SIGNIFICANT CHANGE UP (ref 0–41)
ANION GAP SERPL CALC-SCNC: 19 MMOL/L — HIGH (ref 7–14)
APPEARANCE UR: CLEAR — SIGNIFICANT CHANGE UP
APTT BLD: SIGNIFICANT CHANGE UP SEC (ref 27–39.2)
AST SERPL-CCNC: 45 U/L — HIGH (ref 0–41)
BACTERIA # UR AUTO: ABNORMAL
BASOPHILS # BLD AUTO: 0.04 K/UL — SIGNIFICANT CHANGE UP (ref 0–0.2)
BASOPHILS NFR BLD AUTO: 0.5 % — SIGNIFICANT CHANGE UP (ref 0–1)
BILIRUB SERPL-MCNC: 0.7 MG/DL — SIGNIFICANT CHANGE UP (ref 0.2–1.2)
BILIRUB UR-MCNC: ABNORMAL
BUN SERPL-MCNC: 13 MG/DL — SIGNIFICANT CHANGE UP (ref 10–20)
CALCIUM SERPL-MCNC: 9 MG/DL — SIGNIFICANT CHANGE UP (ref 8.5–10.1)
CHLORIDE SERPL-SCNC: 103 MMOL/L — SIGNIFICANT CHANGE UP (ref 98–110)
CO2 SERPL-SCNC: 18 MMOL/L — SIGNIFICANT CHANGE UP (ref 17–32)
COLOR SPEC: YELLOW — SIGNIFICANT CHANGE UP
CREAT SERPL-MCNC: 1 MG/DL — SIGNIFICANT CHANGE UP (ref 0.7–1.5)
DIFF PNL FLD: ABNORMAL
EOSINOPHIL # BLD AUTO: 0.1 K/UL — SIGNIFICANT CHANGE UP (ref 0–0.7)
EOSINOPHIL NFR BLD AUTO: 1.2 % — SIGNIFICANT CHANGE UP (ref 0–8)
EPI CELLS # UR: ABNORMAL /HPF
GLUCOSE SERPL-MCNC: 229 MG/DL — HIGH (ref 70–99)
GLUCOSE UR QL: 100 MG/DL
HCT VFR BLD CALC: 30.4 % — LOW (ref 42–52)
HGB BLD-MCNC: 9.7 G/DL — LOW (ref 14–18)
IMM GRANULOCYTES NFR BLD AUTO: 0.6 % — HIGH (ref 0.1–0.3)
INR BLD: 1.1 RATIO — SIGNIFICANT CHANGE UP (ref 0.65–1.3)
KETONES UR-MCNC: 15
LEUKOCYTE ESTERASE UR-ACNC: SIGNIFICANT CHANGE UP
LYMPHOCYTES # BLD AUTO: 1.69 K/UL — SIGNIFICANT CHANGE UP (ref 1.2–3.4)
LYMPHOCYTES # BLD AUTO: 21 % — SIGNIFICANT CHANGE UP (ref 20.5–51.1)
MCHC RBC-ENTMCNC: 31 PG — SIGNIFICANT CHANGE UP (ref 27–31)
MCHC RBC-ENTMCNC: 31.9 G/DL — LOW (ref 32–37)
MCV RBC AUTO: 97.1 FL — HIGH (ref 80–94)
MONOCYTES # BLD AUTO: 0.59 K/UL — SIGNIFICANT CHANGE UP (ref 0.1–0.6)
MONOCYTES NFR BLD AUTO: 7.3 % — SIGNIFICANT CHANGE UP (ref 1.7–9.3)
NEUTROPHILS # BLD AUTO: 5.57 K/UL — SIGNIFICANT CHANGE UP (ref 1.4–6.5)
NEUTROPHILS NFR BLD AUTO: 69.4 % — SIGNIFICANT CHANGE UP (ref 42.2–75.2)
NITRITE UR-MCNC: POSITIVE
NRBC # BLD: 0 /100 WBCS — SIGNIFICANT CHANGE UP (ref 0–0)
PH UR: 7 — SIGNIFICANT CHANGE UP (ref 5–8)
PLATELET # BLD AUTO: 231 K/UL — SIGNIFICANT CHANGE UP (ref 130–400)
POTASSIUM SERPL-MCNC: 4.1 MMOL/L — SIGNIFICANT CHANGE UP (ref 3.5–5)
POTASSIUM SERPL-SCNC: 4.1 MMOL/L — SIGNIFICANT CHANGE UP (ref 3.5–5)
PROT SERPL-MCNC: 7.1 G/DL — SIGNIFICANT CHANGE UP (ref 6–8)
PROT UR-MCNC: >=300 MG/DL
PROTHROM AB SERPL-ACNC: 12.6 SEC — SIGNIFICANT CHANGE UP (ref 9.95–12.87)
RBC # BLD: 3.13 M/UL — LOW (ref 4.7–6.1)
RBC # FLD: 14.5 % — SIGNIFICANT CHANGE UP (ref 11.5–14.5)
RBC CASTS # UR COMP ASSIST: >50 /HPF
SODIUM SERPL-SCNC: 140 MMOL/L — SIGNIFICANT CHANGE UP (ref 135–146)
SP GR SPEC: 1.02 — SIGNIFICANT CHANGE UP (ref 1.01–1.03)
UROBILINOGEN FLD QL: 1 MG/DL (ref 0.2–0.2)
WBC # BLD: 8.04 K/UL — SIGNIFICANT CHANGE UP (ref 4.8–10.8)
WBC # FLD AUTO: 8.04 K/UL — SIGNIFICANT CHANGE UP (ref 4.8–10.8)
WBC UR QL: SIGNIFICANT CHANGE UP /HPF

## 2019-11-08 PROCEDURE — 99284 EMERGENCY DEPT VISIT MOD MDM: CPT

## 2019-11-08 PROCEDURE — 71045 X-RAY EXAM CHEST 1 VIEW: CPT | Mod: 26

## 2019-11-08 RX ORDER — AZTREONAM 2 G
1 VIAL (EA) INJECTION
Qty: 14 | Refills: 0
Start: 2019-11-08 | End: 2019-11-14

## 2019-11-08 RX ADMIN — Medication 1 TABLET(S): at 14:32

## 2019-11-08 NOTE — ED PROVIDER NOTE - NS ED ROS FT
Review of Systems:  CONSTITUTIONAL: No fever, No diaphoresis, No weight change  SKIN: No rash  HEMATOLOGIC: No abnormal bleeding or bruising  EYES: No eye pain, No blurred vision  ENT: No change in hearing, No sore throat, No neck pain, No rhinorrhea, No ear pain  RESPIRATORY: No shortness of breath, +cough  CARDIAC: No chest pain, No palpitations  GI: No abdominal pain, No nausea, No vomiting, No diarrhea, No constipation, No bright red blood per rectum or melena. No flank pain. +suprapubic pain.   : No dysuria, frequency, hematuria. +Urinary retention.   MUSCULOSKELETAL: No joint paint, No swelling, No back pain  NEUROLOGIC: No numbness, No focal weakness, No headache, No dizziness  All other systems negative, unless specified in HPI

## 2019-11-08 NOTE — ED ADULT NURSE NOTE - NSIMPLEMENTINTERV_GEN_ALL_ED
Implemented All Fall Risk Interventions:  [place] to call system. Call bell, personal items and telephone within reach. Instruct patient to call for assistance. Room bathroom lighting operational. Non-slip footwear when patient is off stretcher. Physically safe environment: no spills, clutter or unnecessary equipment. Stretcher in lowest position, wheels locked, appropriate side rails in place. Provide visual cue, wrist band, yellow gown, etc. Monitor gait and stability. Monitor for mental status changes and reorient to person, place, and time. Review medications for side effects contributing to fall risk. Reinforce activity limits and safety measures with patient and family.

## 2019-11-08 NOTE — ED PROVIDER NOTE - CLINICAL SUMMARY MEDICAL DECISION MAKING FREE TEXT BOX
75 yo M with bladder CA and inabilty to void. in retention. Talamantes placed, labs reviewed. pt with UTI   Seen by  and d/c to fu as out pt

## 2019-11-08 NOTE — ED PROVIDER NOTE - NSFOLLOWUPINSTRUCTIONS_ED_ALL_ED_FT
Urinary Retention    Acute urinary retention is the temporary inability to urinate. This is a common problem in older men. As men age their prostates become larger and block the flow of urine from the bladder. If you are sent home with a palomo catheter and a drainage system make sure to keep the drainage bag emptied and lower than your catheter. Keep the palomo catheter in until you follow up with a urologist.    There are two main types of drainage bags. One is a large bag that usually is used at night. It has a good capacity that will allow you to sleep through the night without having to empty it. The second type is called a leg bag. It has a smaller capacity, so it needs to be emptied more frequently. However, the main advantage is that it can be attached by a leg strap and can go underneath your clothing, allowing you the freedom to move about or leave your home.     SEEK IMMEDIATE MEDICAL CARE IF YOU DEVELOP THE FOLLOWING SYMPTOMS: the catheter stops draining urine, the catheter falls out, abdominal pain, nausea/vomiting, or chills/fever.

## 2019-11-08 NOTE — ED PROVIDER NOTE - PROGRESS NOTE DETAILS
DL: Pts follows with urologist Dr. Butterfield. DL: Pts follows with urologist Dr. Butterfield. Talamantes catheter in place with good output of hematuria. Pt reports moderate pain relief from catheter. DL: Called urologists office, Dr. Butterfield not in office today, told to call [phone] to speak with PA on call. Spoke with Urology GABRIELA Duque. Recommends bladder irrigation and dc with follow up if successful. Will come eval patient.

## 2019-11-08 NOTE — ED PROVIDER NOTE - PHYSICAL EXAMINATION
CONSTITUTIONAL: Well-developed; well-nourished; in no acute distress.   SKIN: warm, dry  HEAD: Normocephalic; atraumatic.  EYES: no conjunctival injection. EOMI.   ENT: No nasal discharge; airway clear.  NECK: Supple; non tender.  CARD: S1, S2 normal; no murmurs, gallops, or rubs. Regular rate and rhythm.   RESP: No wheezes, rales or rhonchi.  ABD: soft ntnd. +ttp over suprapubic region. no CVA tenderness.   EXT: Normal ROM.  No LE edema. No cyanosis.   LYMPH: No acute cervical adenopathy.  NEURO: Alert, oriented, grossly unremarkable. 5/5 strength and sensation to all extremities.   PSYCH: Cooperative, appropriate.

## 2019-11-08 NOTE — ED PROVIDER NOTE - PATIENT PORTAL LINK FT
You can access the FollowMyHealth Patient Portal offered by [facility] by registering at the following website: [email or web address]. By joining [employer]’s FollowMyHealth portal, you will also be able to view your health information using other applications (apps) compatible with our system.

## 2019-11-08 NOTE — ED PROVIDER NOTE - OBJECTIVE STATEMENT
73 y/o M PMH of bladder cancer with METs, HTN, DM, CAD/ Stents and a recent transfusion due to blood loss from bladder cancer, here for no urine output since yesterday. Yesterday Pt had minimal urine output and also reports suprapubic pain. At time of initial evaluation Talamantes was placed, pelvic pain slowly resolved after and bloody urine output noted. Pt has secondary complaint of cough w/o fever, METS are in the lungs.

## 2019-11-08 NOTE — ED ADULT NURSE NOTE - OBJECTIVE STATEMENT
Pt c/o suprapubic pain and being unable to urinate since yesterday, pt has hx of bladder CA with lung mets. Pt also c/o cough. Pt had recent blood transfusion in ED. Denies fevers, chills, back pain, n/v/d, cp, sob.

## 2019-11-11 RX ORDER — NITROFURANTOIN MACROCRYSTAL 50 MG
1 CAPSULE ORAL
Qty: 14 | Refills: 0
Start: 2019-11-11 | End: 2019-11-17

## 2019-11-11 RX ORDER — MOXIFLOXACIN HYDROCHLORIDE TABLETS, 400 MG 400 MG/1
1 TABLET, FILM COATED ORAL
Qty: 14 | Refills: 0
Start: 2019-11-11 | End: 2019-11-17

## 2019-11-11 NOTE — ED POST DISCHARGE NOTE - ADDITIONAL DOCUMENTATION
+urine cx, sent home on bactrim initially switched antibiotics to ciprofloxacin spoke to patient's daughter Corie. Advised urology fu. Given return precautions.

## 2019-11-13 ENCOUNTER — APPOINTMENT (OUTPATIENT)
Dept: UROLOGY | Facility: CLINIC | Age: 74
End: 2019-11-13
Payer: MEDICARE

## 2019-11-13 DIAGNOSIS — Z85.46 PERSONAL HISTORY OF MALIGNANT NEOPLASM OF PROSTATE: ICD-10-CM

## 2019-11-13 DIAGNOSIS — N40.0 BENIGN PROSTATIC HYPERPLASIA WITHOUT LOWER URINARY TRACT SYMPMS: ICD-10-CM

## 2019-11-13 DIAGNOSIS — C68.9 MALIGNANT NEOPLASM OF URINARY ORGAN, UNSPECIFIED: ICD-10-CM

## 2019-11-13 PROCEDURE — 99213 OFFICE O/P EST LOW 20 MIN: CPT

## 2019-11-13 NOTE — PHYSICAL EXAM
[General Appearance - Well Nourished] : well nourished [General Appearance - Well Developed] : well developed [Normal Appearance] : normal appearance [Well Groomed] : well groomed [General Appearance - In No Acute Distress] : no acute distress [Abdomen Soft] : soft [Abdomen Tenderness] : non-tender [Costovertebral Angle Tenderness] : no ~M costovertebral angle tenderness [Edema] : no peripheral edema [] : no respiratory distress [Respiration, Rhythm And Depth] : normal respiratory rhythm and effort [Exaggerated Use Of Accessory Muscles For Inspiration] : no accessory muscle use [Mood] : the mood was normal [Affect] : the affect was normal [Oriented To Time, Place, And Person] : oriented to person, place, and time [Not Anxious] : not anxious [No Focal Deficits] : no focal deficits [Normal Station and Gait] : the gait and station were normal for the patient's age

## 2019-11-18 NOTE — HISTORY OF PRESENT ILLNESS
[Currently Experiencing ___] :  [unfilled] [Urinary Retention] : urinary retention [None] : None [FreeTextEntry1] : This is a 74 year male who had TURBT for small bladder base tumor. pathology shows high grade cancer with extensive invasion into the deep smooth muscle/muscularis propia. focal squamous metaplasia, glandular differentiation. He has been having intermittent hematuria and following with oncology at [facility] [place]. had to go to OR recently to fulgurate bleeding areas in bladder -- likely cancer. Currently has no fevers or chills. He was in ER on 11/8/2019 for urinary retention, Talamantes catheter placed. In addition he was diagnose with UTI, and took Bactrim and the abx switched to Cipro. He has Talamantes catheter in place draining clear yellow urine. \par \par outside notes from Dr garcia show:\par Nov 2013-- prostate biopsy -- adenocarcinoma in multiple cores. -- up to 4 + 4 = 8 on the lateral mid and lateral base of the right\par Had negative Fish in Nov 2018, as well as negative cytology\par \par -Aug 2019  PSA Profile - Total = <0.1. \par

## 2019-11-18 NOTE — ASSESSMENT
[FreeTextEntry1] : This is a 74 year male who had TURBT for small bladder base tumor. pathology shows high grade cancer with extensive invasion into the deep smooth muscle/muscularis propia. focal squamous metaplasia, glandular differentiation. He has been having intermittent hematuria and following with oncology at [facility] [place]. had to go to OR recently to fulgurate bleeding areas in bladder -- likely cancer. Currently has no fevers or chills. He was in ER on 11/8/2019 for urinary retention, Talamantes catheter placed. In addition he was diagnose with UTI, and took Bactrim and the abx switched to Cipro. He has Talamantes catheter in place draining clear yellow urine. \par \par outside notes from Dr garcia show:\par Nov 2013-- prostate biopsy -- adenocarcinoma in multiple cores. -- up to 4 + 4 = 8 on the lateral mid and lateral base of the right\par Had negative Fish in Nov 2018, as well as negative cytology\par \par -Aug 2019  PSA Profile - Total = <0.1. \par

## 2019-11-20 ENCOUNTER — APPOINTMENT (OUTPATIENT)
Dept: CARDIOLOGY | Facility: CLINIC | Age: 74
End: 2019-11-20
Payer: MEDICARE

## 2019-11-20 PROCEDURE — 99213 OFFICE O/P EST LOW 20 MIN: CPT

## 2019-11-20 PROCEDURE — 93000 ELECTROCARDIOGRAM COMPLETE: CPT

## 2019-11-21 ENCOUNTER — TRANSCRIPTION ENCOUNTER (OUTPATIENT)
Age: 74
End: 2019-11-21

## 2019-11-25 NOTE — ED ADULT NURSE NOTE - NSIMPLEMENTINTERV_GEN_ALL_ED
No
Implemented All Universal Safety Interventions:  [place] to call system. Call bell, personal items and telephone within reach. Instruct patient to call for assistance. Room bathroom lighting operational. Non-slip footwear when patient is off stretcher. Physically safe environment: no spills, clutter or unnecessary equipment. Stretcher in lowest position, wheels locked, appropriate side rails in place.

## 2019-12-04 ENCOUNTER — APPOINTMENT (OUTPATIENT)
Dept: UROLOGY | Facility: CLINIC | Age: 74
End: 2019-12-04
Payer: MEDICARE

## 2019-12-04 VITALS — BODY MASS INDEX: 28.42 KG/M2 | WEIGHT: 203 LBS | HEIGHT: 71 IN

## 2019-12-04 DIAGNOSIS — R31.9 HEMATURIA, UNSPECIFIED: ICD-10-CM

## 2019-12-04 DIAGNOSIS — R33.8 OTHER RETENTION OF URINE: ICD-10-CM

## 2019-12-04 PROCEDURE — 51702 INSERT TEMP BLADDER CATH: CPT

## 2019-12-08 ENCOUNTER — INPATIENT (INPATIENT)
Facility: HOSPITAL | Age: 74
LOS: 7 days | Discharge: NURSING, REHAB OR LONG-TERM CARE | End: 2019-12-16
Attending: HOSPITALIST | Admitting: HOSPITALIST
Payer: MEDICARE

## 2019-12-08 VITALS
OXYGEN SATURATION: 98 % | RESPIRATION RATE: 18 BRPM | SYSTOLIC BLOOD PRESSURE: 133 MMHG | DIASTOLIC BLOOD PRESSURE: 64 MMHG | HEART RATE: 105 BPM | TEMPERATURE: 98 F

## 2019-12-08 DIAGNOSIS — Z95.5 PRESENCE OF CORONARY ANGIOPLASTY IMPLANT AND GRAFT: Chronic | ICD-10-CM

## 2019-12-08 DIAGNOSIS — Z98.890 OTHER SPECIFIED POSTPROCEDURAL STATES: Chronic | ICD-10-CM

## 2019-12-08 LAB
ALBUMIN SERPL ELPH-MCNC: 2.7 G/DL — LOW (ref 3.5–5.2)
ALP SERPL-CCNC: 234 U/L — HIGH (ref 30–115)
ALT FLD-CCNC: 44 U/L — HIGH (ref 0–41)
ANION GAP SERPL CALC-SCNC: 19 MMOL/L — HIGH (ref 7–14)
APPEARANCE UR: ABNORMAL
APTT BLD: 32.5 SEC — SIGNIFICANT CHANGE UP (ref 27–39.2)
AST SERPL-CCNC: 126 U/L — HIGH (ref 0–41)
BACTERIA # UR AUTO: ABNORMAL
BASOPHILS # BLD AUTO: 0.02 K/UL — SIGNIFICANT CHANGE UP (ref 0–0.2)
BASOPHILS NFR BLD AUTO: 0.1 % — SIGNIFICANT CHANGE UP (ref 0–1)
BILIRUB DIRECT SERPL-MCNC: 3 MG/DL — HIGH (ref 0–0.2)
BILIRUB INDIRECT FLD-MCNC: 0.5 MG/DL — SIGNIFICANT CHANGE UP (ref 0.2–1.2)
BILIRUB SERPL-MCNC: 3.5 MG/DL — HIGH (ref 0.2–1.2)
BILIRUB UR-MCNC: NEGATIVE — SIGNIFICANT CHANGE UP
BLD GP AB SCN SERPL QL: SIGNIFICANT CHANGE UP
BUN SERPL-MCNC: 11 MG/DL — SIGNIFICANT CHANGE UP (ref 10–20)
CALCIUM SERPL-MCNC: 8.3 MG/DL — LOW (ref 8.5–10.1)
CHLORIDE SERPL-SCNC: 94 MMOL/L — LOW (ref 98–110)
CO2 SERPL-SCNC: 23 MMOL/L — SIGNIFICANT CHANGE UP (ref 17–32)
COLOR SPEC: ABNORMAL
CREAT SERPL-MCNC: 0.9 MG/DL — SIGNIFICANT CHANGE UP (ref 0.7–1.5)
DIFF PNL FLD: ABNORMAL
EOSINOPHIL # BLD AUTO: 0.01 K/UL — SIGNIFICANT CHANGE UP (ref 0–0.7)
EOSINOPHIL NFR BLD AUTO: 0.1 % — SIGNIFICANT CHANGE UP (ref 0–8)
EPI CELLS # UR: SIGNIFICANT CHANGE UP /HPF (ref 0–5)
GLUCOSE SERPL-MCNC: 173 MG/DL — HIGH (ref 70–99)
GLUCOSE UR QL: NEGATIVE — SIGNIFICANT CHANGE UP
HCT VFR BLD CALC: 27.5 % — LOW (ref 42–52)
HGB BLD-MCNC: 8.9 G/DL — LOW (ref 14–18)
IMM GRANULOCYTES NFR BLD AUTO: 0.9 % — HIGH (ref 0.1–0.3)
INR BLD: 1.42 RATIO — HIGH (ref 0.65–1.3)
KETONES UR-MCNC: SIGNIFICANT CHANGE UP
LACTATE SERPL-SCNC: 2.7 MMOL/L — HIGH (ref 0.7–2)
LEUKOCYTE ESTERASE UR-ACNC: ABNORMAL
LIDOCAIN IGE QN: 49 U/L — SIGNIFICANT CHANGE UP (ref 7–60)
LYMPHOCYTES # BLD AUTO: 1.68 K/UL — SIGNIFICANT CHANGE UP (ref 1.2–3.4)
LYMPHOCYTES # BLD AUTO: 11.6 % — LOW (ref 20.5–51.1)
MCHC RBC-ENTMCNC: 28 PG — SIGNIFICANT CHANGE UP (ref 27–31)
MCHC RBC-ENTMCNC: 32.4 G/DL — SIGNIFICANT CHANGE UP (ref 32–37)
MCV RBC AUTO: 86.5 FL — SIGNIFICANT CHANGE UP (ref 80–94)
MONOCYTES # BLD AUTO: 1.34 K/UL — HIGH (ref 0.1–0.6)
MONOCYTES NFR BLD AUTO: 9.2 % — SIGNIFICANT CHANGE UP (ref 1.7–9.3)
NEUTROPHILS # BLD AUTO: 11.36 K/UL — HIGH (ref 1.4–6.5)
NEUTROPHILS NFR BLD AUTO: 78.1 % — HIGH (ref 42.2–75.2)
NITRITE UR-MCNC: NEGATIVE — SIGNIFICANT CHANGE UP
NRBC # BLD: 0 /100 WBCS — SIGNIFICANT CHANGE UP (ref 0–0)
PH UR: 6 — SIGNIFICANT CHANGE UP (ref 5–8)
PLATELET # BLD AUTO: 259 K/UL — SIGNIFICANT CHANGE UP (ref 130–400)
POTASSIUM SERPL-MCNC: 2.3 MMOL/L — CRITICAL LOW (ref 3.5–5)
POTASSIUM SERPL-SCNC: 2.3 MMOL/L — CRITICAL LOW (ref 3.5–5)
PROT SERPL-MCNC: 6.1 G/DL — SIGNIFICANT CHANGE UP (ref 6–8)
PROT UR-MCNC: ABNORMAL
PROTHROM AB SERPL-ACNC: 16.3 SEC — HIGH (ref 9.95–12.87)
RBC # BLD: 3.18 M/UL — LOW (ref 4.7–6.1)
RBC # FLD: 16.1 % — HIGH (ref 11.5–14.5)
RBC CASTS # UR COMP ASSIST: SIGNIFICANT CHANGE UP /HPF (ref 0–4)
SODIUM SERPL-SCNC: 136 MMOL/L — SIGNIFICANT CHANGE UP (ref 135–146)
SP GR SPEC: 1.01 — SIGNIFICANT CHANGE UP (ref 1.01–1.02)
TROPONIN T SERPL-MCNC: 0.02 NG/ML — HIGH
UROBILINOGEN FLD QL: SIGNIFICANT CHANGE UP
WBC # BLD: 14.54 K/UL — HIGH (ref 4.8–10.8)
WBC # FLD AUTO: 14.54 K/UL — HIGH (ref 4.8–10.8)
WBC UR QL: SIGNIFICANT CHANGE UP /HPF (ref 0–5)

## 2019-12-08 PROCEDURE — 93010 ELECTROCARDIOGRAM REPORT: CPT | Mod: 77

## 2019-12-08 PROCEDURE — 71275 CT ANGIOGRAPHY CHEST: CPT | Mod: 26

## 2019-12-08 PROCEDURE — 99285 EMERGENCY DEPT VISIT HI MDM: CPT | Mod: GC

## 2019-12-08 PROCEDURE — 93010 ELECTROCARDIOGRAM REPORT: CPT

## 2019-12-08 PROCEDURE — 74177 CT ABD & PELVIS W/CONTRAST: CPT | Mod: 26

## 2019-12-08 PROCEDURE — 74022 RADEX COMPL AQT ABD SERIES: CPT | Mod: 26

## 2019-12-08 RX ORDER — IOHEXOL 300 MG/ML
30 INJECTION, SOLUTION INTRAVENOUS ONCE
Refills: 0 | Status: COMPLETED | OUTPATIENT
Start: 2019-12-08 | End: 2019-12-08

## 2019-12-08 RX ORDER — MAGNESIUM SULFATE 500 MG/ML
2 VIAL (ML) INJECTION ONCE
Refills: 0 | Status: COMPLETED | OUTPATIENT
Start: 2019-12-08 | End: 2019-12-08

## 2019-12-08 RX ORDER — VANCOMYCIN HCL 1 G
1000 VIAL (EA) INTRAVENOUS ONCE
Refills: 0 | Status: COMPLETED | OUTPATIENT
Start: 2019-12-08 | End: 2019-12-08

## 2019-12-08 RX ORDER — SODIUM CHLORIDE 9 MG/ML
1000 INJECTION, SOLUTION INTRAVENOUS ONCE
Refills: 0 | Status: COMPLETED | OUTPATIENT
Start: 2019-12-08 | End: 2019-12-08

## 2019-12-08 RX ORDER — POTASSIUM CHLORIDE 20 MEQ
40 PACKET (EA) ORAL ONCE
Refills: 0 | Status: COMPLETED | OUTPATIENT
Start: 2019-12-08 | End: 2019-12-08

## 2019-12-08 RX ORDER — POTASSIUM CHLORIDE 20 MEQ
20 PACKET (EA) ORAL ONCE
Refills: 0 | Status: COMPLETED | OUTPATIENT
Start: 2019-12-08 | End: 2019-12-08

## 2019-12-08 RX ADMIN — Medication 40 MILLIEQUIVALENT(S): at 21:15

## 2019-12-08 RX ADMIN — SODIUM CHLORIDE 1000 MILLILITER(S): 9 INJECTION, SOLUTION INTRAVENOUS at 21:51

## 2019-12-08 RX ADMIN — IOHEXOL 30 MILLILITER(S): 300 INJECTION, SOLUTION INTRAVENOUS at 20:06

## 2019-12-08 RX ADMIN — Medication 50 MILLIEQUIVALENT(S): at 21:51

## 2019-12-08 RX ADMIN — Medication 250 MILLIGRAM(S): at 23:10

## 2019-12-08 RX ADMIN — SODIUM CHLORIDE 1000 MILLILITER(S): 9 INJECTION, SOLUTION INTRAVENOUS at 20:01

## 2019-12-08 RX ADMIN — Medication 50 GRAM(S): at 21:15

## 2019-12-08 NOTE — ED ADULT TRIAGE NOTE - CHIEF COMPLAINT QUOTE
as per ems pt has prostate cancer and got a fever of 102 yesterday, currently no fever but family wants him evaluated

## 2019-12-08 NOTE — ED PROVIDER NOTE - ATTENDING CONTRIBUTION TO CARE
73 y/o M with PMHx of CAD s/p stents, HTN, HLD, DMII, Prostate cancer (2013) in remission, TURBT for small bladder base tumor (July 2019), path showing high grade cancer with extensive invasion into deep smooth muscle/muscularis propria, focal squamous metaplasia, glandular differentiation (urology Dr. Butterfield), with palomo in place, presents for abd pain x few days associated with constipation x ~ 1 week and generalized weakness. pt reports he tried stool softeners with no relief. pt also reports decreased urine output from palomo. No fever, chills, n/v, cp,  pleuritic cp, sob, palpitations, diaphoresis, cough, ha/lh/dizziness, numbness/tingling, neck pain/ stiffness, diarrhea,melena/brbpr, urinary symptoms, edema, calf pain/swelling/erythema, sick contacts, recent travel or rash.    Vital Signs: I have reviewed the initial vital signs. Constitutional: Male pt sitting on stretcher speaking full sentences. Integumentary: No rash. ENT: MMM NECK: Supple, non-tender, no meningeal signs. Cardiovascular: Tachycardiac, radial pulses 2/4 b/l. No JVD. Respiratory: Pt appears tachypneic off NC, improved on NC, BS present b/l, ctabl, no wheezing or crackles,  no accessory muscle use, no stridor. Gastrointestinal: BS present throughout all 4 quadrants, soft, distended with diffuse tenderness to palpation, no rebound tenderness or guarding, no cvat. Musculoskeletal: FROM, no calf pain//erythema. Neurologic: Awake and alert follows all commands, motor 5/5 and sensation intact throughout upper and lowe ext, CN II-XII intact, No facial droop or slurring of speech. No focal deficits.

## 2019-12-08 NOTE — ED PROVIDER NOTE - CLINICAL SUMMARY MEDICAL DECISION MAKING FREE TEXT BOX
Patient was signed out to me (Dr. Hernandez) by Dr. Ríos. 75yo M with PMHx CAd/stents, HTN, HLD, DM, prostate CA, bladder CA with mets, chronic Talamantes for 1 month, presents for weakness for 2 days, abdominal pain/constipation for 1 week. Initial workup showing severe hypokalemia and initial EKG with QTc 750, though given flattening of T-waves, difficult to determine true interval. K and Mg repleted with repeat EKG showing QTc 543. Patient without CP, SOB, syncope/ lightheadedness throughout ED stay. Elevated LFTs/bilirubin without acute CT findings (except Talamantes, which was adjusted). ABx given to cover urine. Admitted. Patient was signed out to me (Dr. Hernandez) by Dr. Ríos. 73yo M with PMHx CAD/stents, HTN, HLD, DM, prostate CA, bladder CA with mets, chronic Talamantes for 1 month, presents for weakness for 2 days, abdominal pain/constipation for 1 week. Initial workup showing severe hypokalemia and initial EKG with QTc 750, though given flattening of T-waves, difficult to determine true interval. K and Mg repleted with repeat EKG showing QTc 543. Patient without CP, SOB, syncope/ lightheadedness throughout ED stay. Elevated LFTs/bilirubin without acute CT findings (except Talamantes, which was adjusted), possibly related to progression of known metastatic disease though will require further eval. ABx given to cover urine. Admitted.

## 2019-12-08 NOTE — ED PROVIDER NOTE - NS ED ROS FT
Constitutional: See HPI.  Eyes: No visual changes, eye pain or discharge.  ENMT: No hearing changes, pain, discharge or infections. No neck pain or stiffness.  Cardiac: No chest pain, SOB or edema. No chest pain with exertion.  Respiratory: No cough or respiratory distress.   GI: +constipation, abdominal pain; No nausea, vomiting, diarrhea   : No dysuria, frequency or burning.  MS: +generalized weakness; No myalgia, muscle weakness, joint pain or back pain.  Neuro: No headache or weakness. No LOC.  Skin: No skin rash.  Endo: + hx of DM  Except as documented in HPI, all other review of systems is negative

## 2019-12-08 NOTE — ED PROVIDER NOTE - PHYSICAL EXAMINATION
CONSTITUTIONAL: Ill appearing elderly male   SKIN: warm, dry; pale  HEAD: Normocephalic; atraumatic.  EYES: no conj injection  ENT: No nasal discharge; airway clear.  NECK: Supple; non tender.  CARD: S1, S2 normal; no murmurs, gallops, or rubs. Regular rate and rhythm.   RESP: No wheezes, rales or rhonchi.  ABD: soft, diffuse tenderness, non-distended  : + dark urine in palomo bag  EXT: No clubbing, cyanosis or edema.   NEURO: Alert, oriented, grossly unremarkable  PSYCH: Cooperative, appropriate.

## 2019-12-08 NOTE — ED PROVIDER NOTE - OBJECTIVE STATEMENT
73 y/o male with pmhx of active bladder CA mets to lung and bladder, HTN, DM, CAD presents with generalized weakness. Patient states he has been feeling extremely weak for the past 2 days, has no energy to get out of bed. Patient also admits to having palomo catheter and decreased urine output in addition to diffuse abdominal pain. Admits to last BM 1 week ago. Denies fevers/chills, sob, chest pain, URI, n/v, leg swelling.

## 2019-12-08 NOTE — ED PROVIDER NOTE - ADMIT DISPOSITION PRESENT ON ADMISSION SEPSIS Q3 - REPEAT LACTATE WAS DRAWN
Complete 7 days of prednisone.  Complete 2 weeks of antibiotics.  Use nasal spray daily - takes time to work.  Use antihistamine nightly - Claritin.  CT scan of sinuses after 2 weeks of treatment.  Consider ENT consult next depending on results and symptoms.      
Repeat lactate was drawn.

## 2019-12-08 NOTE — ED PROVIDER NOTE - CARE PLAN
Assessment and plan of treatment:	Plan: Monitor, NC, EKG, CXR, labs, urine, imaging, Principal Discharge DX:	Weakness  Assessment and plan of treatment:	Plan: Monitor, NC, EKG, CXR, labs, urine, imaging,  Secondary Diagnosis:	UTI (urinary tract infection)  Secondary Diagnosis:	Hypokalemia  Secondary Diagnosis:	Prolonged QT interval  Secondary Diagnosis:	Elevated LFTs  Secondary Diagnosis:	Sepsis

## 2019-12-08 NOTE — ED PROVIDER NOTE - PROGRESS NOTE DETAILS
hgb 8.9, previous 9.7 on 11/8/2019 urine noted, based on previous culture sensitive to leva sebastian, cipro, and vancomycin, ekg with qtc ~ 750, pt was given magnesium, will give vancomycin. urine noted, based on previous culture sensitive to leva sebastian, cipro, and vancomycin, ekg with qtc ~ 750, pt was given magnesium, will give vancomycin. spoke with daughter aware of current findings, k was low and repleted as well, pending imaging. pt resting on stretcher, aware of plan.

## 2019-12-09 LAB
ALBUMIN SERPL ELPH-MCNC: 2.5 G/DL — LOW (ref 3.5–5.2)
ALP SERPL-CCNC: 222 U/L — HIGH (ref 30–115)
ALT FLD-CCNC: 42 U/L — HIGH (ref 0–41)
ANION GAP SERPL CALC-SCNC: 18 MMOL/L — HIGH (ref 7–14)
ANION GAP SERPL CALC-SCNC: 19 MMOL/L — HIGH (ref 7–14)
AST SERPL-CCNC: 127 U/L — HIGH (ref 0–41)
BASE EXCESS BLDV CALC-SCNC: 5.8 MMOL/L — HIGH (ref -2–2)
BILIRUB DIRECT SERPL-MCNC: 3.1 MG/DL — HIGH (ref 0–0.2)
BILIRUB INDIRECT FLD-MCNC: 0.5 MG/DL — SIGNIFICANT CHANGE UP (ref 0.2–1.2)
BILIRUB SERPL-MCNC: 3.6 MG/DL — HIGH (ref 0.2–1.2)
BLD GP AB SCN SERPL QL: SIGNIFICANT CHANGE UP
BUN SERPL-MCNC: 10 MG/DL — SIGNIFICANT CHANGE UP (ref 10–20)
BUN SERPL-MCNC: 11 MG/DL — SIGNIFICANT CHANGE UP (ref 10–20)
CA-I SERPL-SCNC: 1.08 MMOL/L — LOW (ref 1.12–1.3)
CALCIUM SERPL-MCNC: 7.9 MG/DL — LOW (ref 8.5–10.1)
CALCIUM SERPL-MCNC: 8 MG/DL — LOW (ref 8.5–10.1)
CHLORIDE SERPL-SCNC: 99 MMOL/L — SIGNIFICANT CHANGE UP (ref 98–110)
CHLORIDE SERPL-SCNC: 99 MMOL/L — SIGNIFICANT CHANGE UP (ref 98–110)
CO2 SERPL-SCNC: 21 MMOL/L — SIGNIFICANT CHANGE UP (ref 17–32)
CO2 SERPL-SCNC: 22 MMOL/L — SIGNIFICANT CHANGE UP (ref 17–32)
CREAT SERPL-MCNC: 0.7 MG/DL — SIGNIFICANT CHANGE UP (ref 0.7–1.5)
CREAT SERPL-MCNC: 0.8 MG/DL — SIGNIFICANT CHANGE UP (ref 0.7–1.5)
ESTIMATED AVERAGE GLUCOSE: 126 MG/DL — HIGH (ref 68–114)
GAS PNL BLDV: 138 MMOL/L — SIGNIFICANT CHANGE UP (ref 136–145)
GAS PNL BLDV: SIGNIFICANT CHANGE UP
GLUCOSE BLDC GLUCOMTR-MCNC: 183 MG/DL — HIGH (ref 70–99)
GLUCOSE BLDC GLUCOMTR-MCNC: 193 MG/DL — HIGH (ref 70–99)
GLUCOSE BLDC GLUCOMTR-MCNC: 199 MG/DL — HIGH (ref 70–99)
GLUCOSE BLDC GLUCOMTR-MCNC: 203 MG/DL — HIGH (ref 70–99)
GLUCOSE SERPL-MCNC: 176 MG/DL — HIGH (ref 70–99)
GLUCOSE SERPL-MCNC: 178 MG/DL — HIGH (ref 70–99)
HBA1C BLD-MCNC: 6 % — HIGH (ref 4–5.6)
HCO3 BLDV-SCNC: 29 MMOL/L — SIGNIFICANT CHANGE UP (ref 22–29)
HCT VFR BLD CALC: 26.4 % — LOW (ref 42–52)
HCT VFR BLDA CALC: 26.6 % — LOW (ref 34–44)
HGB BLD CALC-MCNC: 8.7 G/DL — LOW (ref 14–18)
HGB BLD-MCNC: 8.4 G/DL — LOW (ref 14–18)
LACTATE BLDV-MCNC: 1.9 MMOL/L — HIGH (ref 0.5–1.6)
MAGNESIUM SERPL-MCNC: 2 MG/DL — SIGNIFICANT CHANGE UP (ref 1.8–2.4)
MAGNESIUM SERPL-MCNC: 2.1 MG/DL — SIGNIFICANT CHANGE UP (ref 1.8–2.4)
MCHC RBC-ENTMCNC: 28 PG — SIGNIFICANT CHANGE UP (ref 27–31)
MCHC RBC-ENTMCNC: 31.8 G/DL — LOW (ref 32–37)
MCV RBC AUTO: 88 FL — SIGNIFICANT CHANGE UP (ref 80–94)
NRBC # BLD: 0 /100 WBCS — SIGNIFICANT CHANGE UP (ref 0–0)
PCO2 BLDV: 37 MMHG — LOW (ref 41–51)
PH BLDV: 7.5 — HIGH (ref 7.26–7.43)
PLATELET # BLD AUTO: 242 K/UL — SIGNIFICANT CHANGE UP (ref 130–400)
PO2 BLDV: 48 MMHG — HIGH (ref 20–40)
POTASSIUM BLDV-SCNC: 2.8 MMOL/L — LOW (ref 3.3–5.6)
POTASSIUM SERPL-MCNC: 3.1 MMOL/L — LOW (ref 3.5–5)
POTASSIUM SERPL-MCNC: 3.4 MMOL/L — LOW (ref 3.5–5)
POTASSIUM SERPL-SCNC: 3.1 MMOL/L — LOW (ref 3.5–5)
POTASSIUM SERPL-SCNC: 3.4 MMOL/L — LOW (ref 3.5–5)
PROT SERPL-MCNC: 5.9 G/DL — LOW (ref 6–8)
RBC # BLD: 3 M/UL — LOW (ref 4.7–6.1)
RBC # FLD: 16.5 % — HIGH (ref 11.5–14.5)
SAO2 % BLDV: 83 % — SIGNIFICANT CHANGE UP
SODIUM SERPL-SCNC: 138 MMOL/L — SIGNIFICANT CHANGE UP (ref 135–146)
SODIUM SERPL-SCNC: 140 MMOL/L — SIGNIFICANT CHANGE UP (ref 135–146)
WBC # BLD: 14.3 K/UL — HIGH (ref 4.8–10.8)
WBC # FLD AUTO: 14.3 K/UL — HIGH (ref 4.8–10.8)

## 2019-12-09 PROCEDURE — 99223 1ST HOSP IP/OBS HIGH 75: CPT

## 2019-12-09 PROCEDURE — 93970 EXTREMITY STUDY: CPT | Mod: 26

## 2019-12-09 PROCEDURE — 76705 ECHO EXAM OF ABDOMEN: CPT | Mod: 26

## 2019-12-09 PROCEDURE — 78226 HEPATOBILIARY SYSTEM IMAGING: CPT | Mod: 26

## 2019-12-09 PROCEDURE — 99223 1ST HOSP IP/OBS HIGH 75: CPT | Mod: AI

## 2019-12-09 PROCEDURE — 93010 ELECTROCARDIOGRAM REPORT: CPT

## 2019-12-09 RX ORDER — TAMSULOSIN HYDROCHLORIDE 0.4 MG/1
0.4 CAPSULE ORAL AT BEDTIME
Refills: 0 | Status: DISCONTINUED | OUTPATIENT
Start: 2019-12-09 | End: 2019-12-16

## 2019-12-09 RX ORDER — CEFEPIME 1 G/1
INJECTION, POWDER, FOR SOLUTION INTRAMUSCULAR; INTRAVENOUS
Refills: 0 | Status: DISCONTINUED | OUTPATIENT
Start: 2019-12-09 | End: 2019-12-09

## 2019-12-09 RX ORDER — SODIUM CHLORIDE 9 MG/ML
1000 INJECTION INTRAMUSCULAR; INTRAVENOUS; SUBCUTANEOUS
Refills: 0 | Status: DISCONTINUED | OUTPATIENT
Start: 2019-12-09 | End: 2019-12-11

## 2019-12-09 RX ORDER — OXYBUTYNIN CHLORIDE 5 MG
5 TABLET ORAL DAILY
Refills: 0 | Status: DISCONTINUED | OUTPATIENT
Start: 2019-12-09 | End: 2019-12-16

## 2019-12-09 RX ORDER — ATORVASTATIN CALCIUM 80 MG/1
10 TABLET, FILM COATED ORAL DAILY
Refills: 0 | Status: DISCONTINUED | OUTPATIENT
Start: 2019-12-09 | End: 2019-12-16

## 2019-12-09 RX ORDER — METRONIDAZOLE 500 MG
TABLET ORAL
Refills: 0 | Status: DISCONTINUED | OUTPATIENT
Start: 2019-12-09 | End: 2019-12-09

## 2019-12-09 RX ORDER — GLUCAGON INJECTION, SOLUTION 0.5 MG/.1ML
1 INJECTION, SOLUTION SUBCUTANEOUS ONCE
Refills: 0 | Status: DISCONTINUED | OUTPATIENT
Start: 2019-12-09 | End: 2019-12-10

## 2019-12-09 RX ORDER — METRONIDAZOLE 500 MG
500 TABLET ORAL ONCE
Refills: 0 | Status: COMPLETED | OUTPATIENT
Start: 2019-12-09 | End: 2019-12-09

## 2019-12-09 RX ORDER — POTASSIUM CHLORIDE 20 MEQ
40 PACKET (EA) ORAL ONCE
Refills: 0 | Status: COMPLETED | OUTPATIENT
Start: 2019-12-09 | End: 2019-12-09

## 2019-12-09 RX ORDER — POTASSIUM CHLORIDE 20 MEQ
20 PACKET (EA) ORAL
Refills: 0 | Status: COMPLETED | OUTPATIENT
Start: 2019-12-09 | End: 2019-12-09

## 2019-12-09 RX ORDER — CEFEPIME 1 G/1
1000 INJECTION, POWDER, FOR SOLUTION INTRAMUSCULAR; INTRAVENOUS ONCE
Refills: 0 | Status: COMPLETED | OUTPATIENT
Start: 2019-12-09 | End: 2019-12-09

## 2019-12-09 RX ORDER — DRONABINOL 2.5 MG
2.5 CAPSULE ORAL DAILY
Refills: 0 | Status: DISCONTINUED | OUTPATIENT
Start: 2019-12-09 | End: 2019-12-13

## 2019-12-09 RX ORDER — DEXTROSE 50 % IN WATER 50 %
25 SYRINGE (ML) INTRAVENOUS ONCE
Refills: 0 | Status: DISCONTINUED | OUTPATIENT
Start: 2019-12-09 | End: 2019-12-10

## 2019-12-09 RX ORDER — DEXTROSE 50 % IN WATER 50 %
15 SYRINGE (ML) INTRAVENOUS ONCE
Refills: 0 | Status: DISCONTINUED | OUTPATIENT
Start: 2019-12-09 | End: 2019-12-10

## 2019-12-09 RX ORDER — RANOLAZINE 500 MG/1
500 TABLET, FILM COATED, EXTENDED RELEASE ORAL
Refills: 0 | Status: DISCONTINUED | OUTPATIENT
Start: 2019-12-09 | End: 2019-12-16

## 2019-12-09 RX ORDER — DEXTROSE 50 % IN WATER 50 %
12.5 SYRINGE (ML) INTRAVENOUS ONCE
Refills: 0 | Status: DISCONTINUED | OUTPATIENT
Start: 2019-12-09 | End: 2019-12-10

## 2019-12-09 RX ORDER — HYDRALAZINE HCL 50 MG
25 TABLET ORAL ONCE
Refills: 0 | Status: COMPLETED | OUTPATIENT
Start: 2019-12-09 | End: 2019-12-09

## 2019-12-09 RX ORDER — METRONIDAZOLE 500 MG
500 TABLET ORAL EVERY 8 HOURS
Refills: 0 | Status: DISCONTINUED | OUTPATIENT
Start: 2019-12-09 | End: 2019-12-09

## 2019-12-09 RX ORDER — INSULIN GLARGINE 100 [IU]/ML
10 INJECTION, SOLUTION SUBCUTANEOUS AT BEDTIME
Refills: 0 | Status: DISCONTINUED | OUTPATIENT
Start: 2019-12-09 | End: 2019-12-16

## 2019-12-09 RX ORDER — ABIRATERONE ACETATE 250 MG/1
1000 TABLET ORAL DAILY
Refills: 0 | Status: DISCONTINUED | OUTPATIENT
Start: 2019-12-09 | End: 2019-12-12

## 2019-12-09 RX ORDER — SODIUM CHLORIDE 9 MG/ML
1000 INJECTION, SOLUTION INTRAVENOUS ONCE
Refills: 0 | Status: COMPLETED | OUTPATIENT
Start: 2019-12-09 | End: 2019-12-09

## 2019-12-09 RX ORDER — SODIUM CHLORIDE 9 MG/ML
1000 INJECTION, SOLUTION INTRAVENOUS
Refills: 0 | Status: DISCONTINUED | OUTPATIENT
Start: 2019-12-09 | End: 2019-12-10

## 2019-12-09 RX ORDER — PIPERACILLIN AND TAZOBACTAM 4; .5 G/20ML; G/20ML
3.38 INJECTION, POWDER, LYOPHILIZED, FOR SOLUTION INTRAVENOUS EVERY 8 HOURS
Refills: 0 | Status: DISCONTINUED | OUTPATIENT
Start: 2019-12-09 | End: 2019-12-11

## 2019-12-09 RX ORDER — INSULIN LISPRO 100/ML
VIAL (ML) SUBCUTANEOUS
Refills: 0 | Status: DISCONTINUED | OUTPATIENT
Start: 2019-12-09 | End: 2019-12-16

## 2019-12-09 RX ORDER — CEFEPIME 1 G/1
1000 INJECTION, POWDER, FOR SOLUTION INTRAMUSCULAR; INTRAVENOUS EVERY 8 HOURS
Refills: 0 | Status: DISCONTINUED | OUTPATIENT
Start: 2019-12-09 | End: 2019-12-09

## 2019-12-09 RX ORDER — ENOXAPARIN SODIUM 100 MG/ML
40 INJECTION SUBCUTANEOUS AT BEDTIME
Refills: 0 | Status: DISCONTINUED | OUTPATIENT
Start: 2019-12-09 | End: 2019-12-10

## 2019-12-09 RX ORDER — POTASSIUM CHLORIDE 20 MEQ
20 PACKET (EA) ORAL ONCE
Refills: 0 | Status: COMPLETED | OUTPATIENT
Start: 2019-12-09 | End: 2019-12-09

## 2019-12-09 RX ORDER — MORPHINE SULFATE 50 MG/1
4 CAPSULE, EXTENDED RELEASE ORAL ONCE
Refills: 0 | Status: DISCONTINUED | OUTPATIENT
Start: 2019-12-09 | End: 2019-12-09

## 2019-12-09 RX ORDER — VANCOMYCIN HCL 1 G
1000 VIAL (EA) INTRAVENOUS EVERY 12 HOURS
Refills: 0 | Status: DISCONTINUED | OUTPATIENT
Start: 2019-12-09 | End: 2019-12-09

## 2019-12-09 RX ORDER — INSULIN LISPRO 100/ML
3 VIAL (ML) SUBCUTANEOUS
Refills: 0 | Status: DISCONTINUED | OUTPATIENT
Start: 2019-12-09 | End: 2019-12-09

## 2019-12-09 RX ORDER — FINASTERIDE 5 MG/1
5 TABLET, FILM COATED ORAL DAILY
Refills: 0 | Status: DISCONTINUED | OUTPATIENT
Start: 2019-12-09 | End: 2019-12-16

## 2019-12-09 RX ADMIN — Medication 3 UNIT(S): at 08:37

## 2019-12-09 RX ADMIN — Medication 50 MILLIEQUIVALENT(S): at 18:08

## 2019-12-09 RX ADMIN — Medication 250 MILLIGRAM(S): at 07:47

## 2019-12-09 RX ADMIN — Medication 1 TABLET(S): at 12:29

## 2019-12-09 RX ADMIN — SODIUM CHLORIDE 60 MILLILITER(S): 9 INJECTION INTRAMUSCULAR; INTRAVENOUS; SUBCUTANEOUS at 10:06

## 2019-12-09 RX ADMIN — Medication 50 MILLIEQUIVALENT(S): at 05:58

## 2019-12-09 RX ADMIN — Medication 2: at 08:37

## 2019-12-09 RX ADMIN — INSULIN GLARGINE 10 UNIT(S): 100 INJECTION, SOLUTION SUBCUTANEOUS at 22:18

## 2019-12-09 RX ADMIN — Medication 100 MILLIGRAM(S): at 10:06

## 2019-12-09 RX ADMIN — PIPERACILLIN AND TAZOBACTAM 25 GRAM(S): 4; .5 INJECTION, POWDER, LYOPHILIZED, FOR SOLUTION INTRAVENOUS at 22:17

## 2019-12-09 RX ADMIN — ENOXAPARIN SODIUM 40 MILLIGRAM(S): 100 INJECTION SUBCUTANEOUS at 22:18

## 2019-12-09 RX ADMIN — SODIUM CHLORIDE 333.33 MILLILITER(S): 9 INJECTION, SOLUTION INTRAVENOUS at 04:26

## 2019-12-09 RX ADMIN — ATORVASTATIN CALCIUM 10 MILLIGRAM(S): 80 TABLET, FILM COATED ORAL at 12:12

## 2019-12-09 RX ADMIN — FINASTERIDE 5 MILLIGRAM(S): 5 TABLET, FILM COATED ORAL at 12:12

## 2019-12-09 RX ADMIN — Medication 2.5 MILLIGRAM(S): at 12:29

## 2019-12-09 RX ADMIN — Medication 5 MILLIGRAM(S): at 12:13

## 2019-12-09 RX ADMIN — CEFEPIME 100 MILLIGRAM(S): 1 INJECTION, POWDER, FOR SOLUTION INTRAMUSCULAR; INTRAVENOUS at 04:26

## 2019-12-09 RX ADMIN — Medication 25 MILLIGRAM(S): at 18:10

## 2019-12-09 RX ADMIN — Medication 40 MILLIEQUIVALENT(S): at 05:58

## 2019-12-09 RX ADMIN — MORPHINE SULFATE 4 MILLIGRAM(S): 50 CAPSULE, EXTENDED RELEASE ORAL at 03:28

## 2019-12-09 RX ADMIN — TAMSULOSIN HYDROCHLORIDE 0.4 MILLIGRAM(S): 0.4 CAPSULE ORAL at 22:18

## 2019-12-09 RX ADMIN — Medication 50 MILLIEQUIVALENT(S): at 15:44

## 2019-12-09 RX ADMIN — Medication 50 MILLIEQUIVALENT(S): at 12:12

## 2019-12-09 RX ADMIN — RANOLAZINE 500 MILLIGRAM(S): 500 TABLET, FILM COATED, EXTENDED RELEASE ORAL at 17:11

## 2019-12-09 RX ADMIN — RANOLAZINE 500 MILLIGRAM(S): 500 TABLET, FILM COATED, EXTENDED RELEASE ORAL at 06:35

## 2019-12-09 RX ADMIN — Medication 5 MILLIGRAM(S): at 17:12

## 2019-12-09 NOTE — H&P ADULT - NSHPLABSRESULTS_GEN_ALL_CORE
8.9    14.54 )-----------( 259      ( 08 Dec 2019 19:50 )             27.5                 136  |  94<L>  |  11  ----------------------------<  173<H>  2.3<LL>   |  23  |  0.9    Ca    8.3<L>      08 Dec 2019 19:50    TPro  6.1  /  Alb  2.7<L>  /  TBili  3.5<H>  /  DBili  3.0<H>  /  AST  126<H>  /  ALT  44<H>  /  AlkPhos  234<H>      LIVER FUNCTIONS - ( 08 Dec 2019 19:50 )  Alb: 2.7 g/dL / Pro: 6.1 g/dL / ALK PHOS: 234 U/L / ALT: 44 U/L / AST: 126 U/L / GGT: x                 PT/INR - ( 08 Dec 2019 19:50 )   PT: 16.30 sec;   INR: 1.42 ratio         PTT - ( 08 Dec 2019 19:50 )  PTT:32.5 sec  CARDIAC MARKERS ( 08 Dec 2019 22:00 )  x     / 0.02 ng/mL / x     / x     / x            Urinalysis Basic - ( 08 Dec 2019 22:10 )    Color: Light Orange / Appearance: Turbid / S.015 / pH: x  Gluc: x / Ketone: Trace  / Bili: Negative / Urobili: <2 mg/dL   Blood: x / Protein: 30 mg/dL / Nitrite: Negative   Leuk Esterase: Moderate / RBC: Many /HPF / WBC Many /HPF   Sq Epi: x / Non Sq Epi: Few /HPF / Bacteria: Many 8.9    14.54 )-----------( 259      ( 08 Dec 2019 19:50 )             27.5                 136  |  94<L>  |  11  ----------------------------<  173<H>  2.3<LL>   |  23  |  0.9    Ca    8.3<L>      08 Dec 2019 19:50    TPro  6.1  /  Alb  2.7<L>  /  TBili  3.5<H>  /  DBili  3.0<H>  /  AST  126<H>  /  ALT  44<H>  /  AlkPhos  234<H>      LIVER FUNCTIONS - ( 08 Dec 2019 19:50 )  Alb: 2.7 g/dL / Pro: 6.1 g/dL / ALK PHOS: 234 U/L / ALT: 44 U/L / AST: 126 U/L / GGT: x                 PT/INR - ( 08 Dec 2019 19:50 )   PT: 16.30 sec;   INR: 1.42 ratio         PTT - ( 08 Dec 2019 19:50 )  PTT:32.5 sec  CARDIAC MARKERS ( 08 Dec 2019 22:00 )  x     / 0.02 ng/mL / x     / x     / x            Urinalysis Basic - ( 08 Dec 2019 22:10 )    Color: Light Orange / Appearance: Turbid / S.015 / pH: x  Gluc: x / Ketone: Trace  / Bili: Negative / Urobili: <2 mg/dL   Blood: x / Protein: 30 mg/dL / Nitrite: Negative   Leuk Esterase: Moderate / RBC: Many /HPF / WBC Many /HPF   Sq Epi: x / Non Sq Epi: Few /HPF / Bacteria: Many      < from: CT Angio Chest w/ IV Cont (19 @ 22:57) >    No evidence of acute pulmonary embolus.    A portion of the Talamantes catheter balloon appears to be inflated within the   prostatic urethra.    Findings compatible with patient's known metastases including innumerable   pulmonary nodules, liver metastases, hilar and inguinal lymphadenopathy.    No evidence for pulmonary consolidation. No evidence for acute bowel   pathology.    Age-indeterminate T12 mild wedge compression deformity.    Cholelithiasis.    < end of copied text >

## 2019-12-09 NOTE — H&P ADULT - HISTORY OF PRESENT ILLNESS
75 yo male with PMH of BPH, HTN, DM, CAD, high grade urothelial carcinoma/metastatic comes to the ED for Abdominal pain and fever for 3 days duration.  pain in lower abdomen, sharp, 4/10, aggravates on straining or pressing lower abdomen.  Fever subjective, low grade, associated with nausea, 1 episode non billious non bloody vomiting, severe loss of appetite, and severe generalized weakness.  patient also complains of dry cough worse on deep inspiration.  No headache, photophobia, chest pain, vertigo, LE edema.    patient has been recently diagnosed with High grade urothelial carcinoma taking chemo pills 2 pills per day for last 2 months.  follows up with Dr Hook at [facility].; recently had PET/CT done; has an appointment there tomorrow.    In the ED, patient Tachycardic, severe generalized weakness, prolonger QT, severe hypokalemia.

## 2019-12-09 NOTE — H&P ADULT - ATTENDING COMMENTS
Patient seen and examined independently. I agree with the resident's note, physical exam, and plan except as below.  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,lung - on chemo at [facility] [place] [facility] [place] [phone]  #hx of prostate CA sp brachytx/RT  #sepsis due to UTI - on cef and vanco - follow up blood culture and urine culture, ID eval, IVfs  #rule out cholecytitis/cholangitis - liver mets with direct bilirubinemia/transamitis (not present in Oct - reviewed old chart) - check HIDA first - will likely need MRCP  if positive call surgery/Ir for drainage  add anaerobic coverage flagyl   #DMII - check fs while npo, insulin coverage if >200  #CAD - no symptoms - cont home meds  #transaminitis - due to biliary obstruction vs chemotx  #chronic palomo/retention - recently changed as outpt - balloon partially inflated in prostatic urethra - advance palomo - discussed with resident   #hypokalemia /Qt prolongation - given magnesium and K+ - still 3.1 - replete 40meq and recheck bmp - cont tele for now - recheck Qtc      poor overall prognosis  discussed with daughter at bedside  placed call to Dr Hook at [facility] [place] - awaiting call back

## 2019-12-09 NOTE — CONSULT NOTE ADULT - SUBJECTIVE AND OBJECTIVE BOX
History obtained from the patients daughter at bedside.     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 Patient is currently being treated with oral chemotherapy.     Patient is presenting for a 1 day history of fever. As per the patients daughter, patient started to complain of Right sided abdominal pain 1 week ago, graded 4/10. Pain was mostly on pressing of the abdomen. He also had an episode of vomiting and nausea. Family members additionally started no notice jaundice yesterday. Review of systems is otherwise positive for SOB and chest pain on deep inspiration.     Review of systems is negative for: diarrhea, blood stool, no headache, photophobia, rashes or skin lesions and no dysphagia. Family have not noticed changes in urine in urine bag, they report no blood in urine since he was discharged in october.     Palomo catheter has not been changed since october     Patient has been recently diagnosed with High grade urothelial carcinoma taking chemo pills 2 pills per day for last 2 months.  follows up with Dr Hook at [facility].; recently had PET/CT done; has an appointment there tomorrow.    REVIEW OF SYSTEMS:    CONSTITUTIONAL: Fever as chills as above  EYES/ENT: No visual changes;  No vertigo or throat pain   NECK: No pain or stiffness  RESPIRATORY: SOB on deep inspiration   CARDIOVASCULAR: No chest pain or palpitations  GASTROINTESTINAL: Right sided abdominal pain  GENITOURINARY: No dysuria, frequency or hematuria. Chronic palomo  SKIN: No itching, rashes    Allergies    No Known Allergies    Intolerances    Antimicrobials:  cefepime   IVPB 1000 milliGRAM(s) IV Intermittent every 8 hours  cefepime   IVPB      metroNIDAZOLE  IVPB 500 milliGRAM(s) IV Intermittent every 8 hours  metroNIDAZOLE  IVPB      vancomycin  IVPB 1000 milliGRAM(s) IV Intermittent every 12 hours      Other Medications:  atorvastatin Oral Tab/Cap - Peds 10 milliGRAM(s) Oral daily  dextrose 40% Gel 15 Gram(s) Oral once PRN  dextrose 5%. 1000 milliLiter(s) IV Continuous <Continuous>  dextrose 50% Injectable 12.5 Gram(s) IV Push once  dextrose 50% Injectable 25 Gram(s) IV Push once  dextrose 50% Injectable 25 Gram(s) IV Push once  dronabinol 2.5 milliGRAM(s) Oral daily  enoxaparin Injectable 40 milliGRAM(s) SubCutaneous at bedtime  finasteride 5 milliGRAM(s) Oral daily  glucagon  Injectable 1 milliGRAM(s) IntraMuscular once PRN  insulin glargine Injectable (LANTUS) 10 Unit(s) SubCutaneous at bedtime  insulin lispro (HumaLOG) corrective regimen sliding scale   SubCutaneous three times a day before meals  multivitamin 1 Tablet(s) Oral daily  oxybutynin 5 milliGRAM(s) Oral daily  potassium chloride  20 mEq/100 mL IVPB 20 milliEquivalent(s) IV Intermittent every 2 hours  predniSONE   Tablet 5 milliGRAM(s) Oral daily  ranolazine 500 milliGRAM(s) Oral two times a day  sodium chloride 0.9%. 1000 milliLiter(s) IV Continuous <Continuous>  tamsulosin Oral Tab/Cap - Peds 0.4 milliGRAM(s) Oral at bedtime      FAMILY HISTORY:    PAST MEDICAL & SURGICAL HISTORY:  Bladder cancer  CAD, multiple vessel  Anemia  PVD (peripheral vascular disease)  Diabetes mellitus: type 2  Hypertension  Prostate CA: with radiation  S/P coronary artery stent placement  H/O hernia repair    SOCIAL HISTORY:    IMMUNIZATIONS  [] Up to Date		[] Not Up to Date:  Recent Immunizations:	[] No	[] Yes:    Head Circumference:  Vital Signs Last 24 Hrs  T(C): 36.7 (09 Dec 2019 07:39), Max: 37.7 (08 Dec 2019 19:30)  T(F): 98 (09 Dec 2019 07:39), Max: 99.8 (08 Dec 2019 19:30)  HR: 87 (09 Dec 2019 07:39) (87 - 105)  BP: 158/76 (09 Dec 2019 07:39) (129/71 - 158/76)  BP(mean): --  RR: 17 (09 Dec 2019 07:39) (17 - 18)  SpO2: 99% (09 Dec 2019 07:39) (93% - 99%)    PHYSICAL EXAM    Respiratory Support:		[x] No	[] Yes:  Vasoactive medication infusion:	[x] No	[] Yes:  Venous catheters:		[] No	[x] Yes:  Bladder catheter:		[] No	[x] Yes:  Other catheters or tubes:	[x] No	[] Yes:    PHYSICAL EXAM:  GENERAL: Patient is ill-appearing  HEAD:  Atraumatic, Normocephalic  EYES: Icteric sclera   ENT: Moist mucous membranes  NECK: Supple, No JVD  CHEST/LUNG: Clear to auscultation bilaterally; No rales, rhonchi, wheezing, or rubs. Unlabored respirations  HEART: Regular rate and rhythm; No murmurs, rubs, or gallops  ABDOMEN: Tenderness to palpation of RUQ and RLQ.   EXTREMITIES:  2+ Peripheral Pulses, brisk capillary refill. No clubbing, cyanosis, or edema  MSK: FROM all 4 extremities, full and equal strength  SKIN: No rashes or lesions. Diffuse jaundice noted    Lab Results:                        8.4    14.30 )-----------( 242      ( 09 Dec 2019 06:56 )             26.4         140  |  99  |  10  ----------------------------<  176<H>  3.1<L>   |  22  |  0.8    Ca    8.0<L>      09 Dec 2019 06:56  Mg     2.1         TPro  5.9<L>  /  Alb  2.5<L>  /  TBili  3.6<H>  /  DBili  3.1<H>  /  AST  127<H>  /  ALT  42<H>  /  AlkPhos  222<H>      LA= 2.7    PT/INR - ( 08 Dec 2019 19:50 )   PT: 16.30 sec;   INR: 1.42 ratio      PTT - ( 08 Dec 2019 19:50 )  PTT:32.5 sec  Urinalysis Basic - ( 08 Dec 2019 22:10 )    Color: Light Orange / Appearance: Turbid / S.015 / pH: x  Gluc: x / Ketone: Trace  / Bili: Negative / Urobili: <2 mg/dL   Blood: x / Protein: 30 mg/dL / Nitrite: Negative   Leuk Esterase: Moderate / RBC: Many /HPF / WBC Many /HPF   Sq Epi: x / Non Sq Epi: Few /HPF / Bacteria: Many History obtained from the patients daughter at bedside.     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 Patient is currently being treated with oral chemotherapy.     Patient is presenting for a 1 day history of fever. As per the patients daughter, patient started to complain of Right sided abdominal pain 1 week ago, graded 4/10. Pain was mostly on pressing of the abdomen. He also had an episode of vomiting and nausea. Family members additionally started no notice jaundice yesterday. Review of systems is otherwise positive for SOB and chest pain on deep inspiration.     Review of systems is negative for: diarrhea, blood stool, no headache, photophobia, rashes or skin lesions and no dysphagia. Family have not noticed changes in urine in urine bag, they report no blood in urine since he was discharged in october.     Palomo catheter has not been changed since october     Patient has been recently diagnosed with High grade urothelial carcinoma taking chemo pills 2 pills per day for last 2 months.  follows up with Dr Hook at [facility].; recently had PET/CT done; has an appointment there tomorrow.    REVIEW OF SYSTEMS:    CONSTITUTIONAL: Fever as chills as above  EYES/ENT: No visual changes;  No vertigo or throat pain   NECK: No pain or stiffness  RESPIRATORY: SOB on deep inspiration   CARDIOVASCULAR: No chest pain or palpitations  GASTROINTESTINAL: Right sided abdominal pain  GENITOURINARY: No dysuria, frequency or hematuria. Chronic palomo  SKIN: No itching, rashes    Allergies    No Known Allergies    Intolerances    Antimicrobials:  cefepime   IVPB 1000 milliGRAM(s) IV Intermittent every 8 hours  cefepime   IVPB      metroNIDAZOLE  IVPB 500 milliGRAM(s) IV Intermittent every 8 hours  metroNIDAZOLE  IVPB      vancomycin  IVPB 1000 milliGRAM(s) IV Intermittent every 12 hours      Other Medications:  atorvastatin Oral Tab/Cap - Peds 10 milliGRAM(s) Oral daily  dextrose 40% Gel 15 Gram(s) Oral once PRN  dextrose 5%. 1000 milliLiter(s) IV Continuous <Continuous>  dextrose 50% Injectable 12.5 Gram(s) IV Push once  dextrose 50% Injectable 25 Gram(s) IV Push once  dextrose 50% Injectable 25 Gram(s) IV Push once  dronabinol 2.5 milliGRAM(s) Oral daily  enoxaparin Injectable 40 milliGRAM(s) SubCutaneous at bedtime  finasteride 5 milliGRAM(s) Oral daily  glucagon  Injectable 1 milliGRAM(s) IntraMuscular once PRN  insulin glargine Injectable (LANTUS) 10 Unit(s) SubCutaneous at bedtime  insulin lispro (HumaLOG) corrective regimen sliding scale   SubCutaneous three times a day before meals  multivitamin 1 Tablet(s) Oral daily  oxybutynin 5 milliGRAM(s) Oral daily  potassium chloride  20 mEq/100 mL IVPB 20 milliEquivalent(s) IV Intermittent every 2 hours  predniSONE   Tablet 5 milliGRAM(s) Oral daily  ranolazine 500 milliGRAM(s) Oral two times a day  sodium chloride 0.9%. 1000 milliLiter(s) IV Continuous <Continuous>  tamsulosin Oral Tab/Cap - Peds 0.4 milliGRAM(s) Oral at bedtime      FAMILY HISTORY:    PAST MEDICAL & SURGICAL HISTORY:  Bladder cancer  CAD, multiple vessel  Anemia  PVD (peripheral vascular disease)  Diabetes mellitus: type 2  Hypertension  Prostate CA: with radiation  S/P coronary artery stent placement  H/O hernia repair    SOCIAL HISTORY:    IMMUNIZATIONS  [] Up to Date		[] Not Up to Date:  Recent Immunizations:	[] No	[] Yes:    Head Circumference:  Vital Signs Last 24 Hrs  T(C): 36.7 (09 Dec 2019 07:39), Max: 37.7 (08 Dec 2019 19:30)  T(F): 98 (09 Dec 2019 07:39), Max: 99.8 (08 Dec 2019 19:30)  HR: 87 (09 Dec 2019 07:39) (87 - 105)  BP: 158/76 (09 Dec 2019 07:39) (129/71 - 158/76)  BP(mean): --  RR: 17 (09 Dec 2019 07:39) (17 - 18)  SpO2: 99% (09 Dec 2019 07:39) (93% - 99%)    PHYSICAL EXAM    Respiratory Support:		[x] No	[] Yes:  Vasoactive medication infusion:	[x] No	[] Yes:  Venous catheters:		[] No	[x] Yes:  Bladder catheter:		[] No	[x] Yes:  Other catheters or tubes:	[x] No	[] Yes:    PHYSICAL EXAM:  GENERAL: Patient is ill-appearing  HEAD:  Atraumatic, Normocephalic  EYES: Icteric sclera   ENT: Moist mucous membranes  NECK: Supple, No JVD  CHEST/LUNG: Clear to auscultation bilaterally; No rales, rhonchi, wheezing, or rubs. Unlabored respirations  HEART: Regular rate and rhythm; No murmurs, rubs, or gallops  ABDOMEN: Tenderness to palpation of RUQ and RLQ.   EXTREMITIES:  2+ Peripheral Pulses, brisk capillary refill. No clubbing, cyanosis, or edema  MSK: FROM all 4 extremities, full and equal strength  SKIN: No rashes or lesions. Diffuse jaundice noted    Lab Results:                        8.4    14.30 )-----------( 242      ( 09 Dec 2019 06:56 )             26.4         140  |  99  |  10  ----------------------------<  176<H>  3.1<L>   |  22  |  0.8    Ca    8.0<L>      09 Dec 2019 06:56  Mg     2.1         TPro  5.9<L>  /  Alb  2.5<L>  /  TBili  3.6<H>  /  DBili  3.1<H>  /  AST  127<H>  /  ALT  42<H>  /  AlkPhos  222<H>      LA= 2.7    PT/INR - ( 08 Dec 2019 19:50 )   PT: 16.30 sec;   INR: 1.42 ratio      PTT - ( 08 Dec 2019 19:50 )  PTT:32.5 sec  Urinalysis Basic - ( 08 Dec 2019 22:10 )    Color: Light Orange / Appearance: Turbid / S.015 / pH: x  Gluc: x / Ketone: Trace  / Bili: Negative / Urobili: <2 mg/dL   Blood: x / Protein: 30 mg/dL / Nitrite: Negative   Leuk Esterase: Moderate / RBC: Many /HPF / WBC Many /HPF   Sq Epi: x / Non Sq Epi: Few /HPF / Bacteria: Many    from: US Abdomen Limited (19 @ 01:51) >  IMPRESSION:    Cholelithiasis without definite sonographic evidence of acute   cholecystitis.    Innumerable echogenic hepatic lesions consistent with patient's known   metastatic disease better characterized on CT abdomen and pelvis   performed on the same date.     from: CT Abdomen and Pelvis w/ Oral Cont and w/ IV Cont (19 @ 22:57) >  LUNGS/PLEURA/AIRWAYS: Paraseptal and centrilobular emphysema. Innumerable   bilateral pulmonary nodules measuring up to 2.5 x 2.0 cm in the medial   right lower lobe (series 6, image 195). Bilateral dependent atelectatic   change. No focal consolidation, pneumothorax or significant pleural   effusion. No evidence for intraluminal central bronchial lesion.    HEPATOBILIARY: Innumerable hypovascular hepatic masses measuring up to at   least 3.5 cm. Cholelithiasis.    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 Patient is currently being treated with oral chemotherapy.     Patient is presenting for fever for 1 day duration.     CAUTI: patient has a chronic palomo and UA is positive. Palomo catheter was changed on admission.   - agree with cefepime for pseudomonal coverage in the setting of immunosuppresion and chronic palomo use.   - f/u blood cultures  - f/u Ucx    Elevated liver function tests: from metastatic disease  - US abdomen, CT abdomen - no evidence of cholecystitis/cholangitis.     SOB: likley from [place] to lung. No evidence of pneumonia History obtained from the patients daughter at bedside.     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 Patient is currently being treated with oral chemotherapy.     Patient is presenting for a 1 day history of fever. As per the patients daughter, patient started to complain of Right sided abdominal pain 1 week ago, graded 4/10. Pain was mostly on pressing of the abdomen. He also had an episode of vomiting and nausea. Family members additionally started no notice jaundice yesterday. Review of systems is otherwise positive for SOB and chest pain on deep inspiration.     Review of systems is negative for: diarrhea, blood stool, no headache, photophobia, rashes or skin lesions and no dysphagia. Family have not noticed changes in urine in urine bag, they report no blood in urine since he was discharged in october.     Palomo catheter has not been changed since october     Patient has been recently diagnosed with High grade urothelial carcinoma taking chemo pills 2 pills per day for last 2 months.    REVIEW OF SYSTEMS:    CONSTITUTIONAL: Fever as chills as above  EYES/ENT: No visual changes;  No vertigo or throat pain   NECK: No pain or stiffness  RESPIRATORY: SOB on deep inspiration   CARDIOVASCULAR: No chest pain or palpitations  GASTROINTESTINAL: Right sided abdominal pain  GENITOURINARY: No dysuria, frequency or hematuria. Chronic palomo  SKIN: No itching, rashes    Allergies    No Known Allergies    Intolerances    Antimicrobials:  cefepime   IVPB 1000 milliGRAM(s) IV Intermittent every 8 hours  cefepime   IVPB      metroNIDAZOLE  IVPB 500 milliGRAM(s) IV Intermittent every 8 hours  metroNIDAZOLE  IVPB      vancomycin  IVPB 1000 milliGRAM(s) IV Intermittent every 12 hours      Other Medications:  atorvastatin Oral Tab/Cap - Peds 10 milliGRAM(s) Oral daily  dextrose 40% Gel 15 Gram(s) Oral once PRN  dextrose 5%. 1000 milliLiter(s) IV Continuous <Continuous>  dextrose 50% Injectable 12.5 Gram(s) IV Push once  dextrose 50% Injectable 25 Gram(s) IV Push once  dextrose 50% Injectable 25 Gram(s) IV Push once  dronabinol 2.5 milliGRAM(s) Oral daily  enoxaparin Injectable 40 milliGRAM(s) SubCutaneous at bedtime  finasteride 5 milliGRAM(s) Oral daily  glucagon  Injectable 1 milliGRAM(s) IntraMuscular once PRN  insulin glargine Injectable (LANTUS) 10 Unit(s) SubCutaneous at bedtime  insulin lispro (HumaLOG) corrective regimen sliding scale   SubCutaneous three times a day before meals  multivitamin 1 Tablet(s) Oral daily  oxybutynin 5 milliGRAM(s) Oral daily  potassium chloride  20 mEq/100 mL IVPB 20 milliEquivalent(s) IV Intermittent every 2 hours  predniSONE   Tablet 5 milliGRAM(s) Oral daily  ranolazine 500 milliGRAM(s) Oral two times a day  sodium chloride 0.9%. 1000 milliLiter(s) IV Continuous <Continuous>  tamsulosin Oral Tab/Cap - Peds 0.4 milliGRAM(s) Oral at bedtime      FAMILY HISTORY:    PAST MEDICAL & SURGICAL HISTORY:  Bladder cancer  CAD, multiple vessel  Anemia  PVD (peripheral vascular disease)  Diabetes mellitus: type 2  Hypertension  Prostate CA: with radiation  S/P coronary artery stent placement  H/O hernia repair    SOCIAL HISTORY:    IMMUNIZATIONS  [] Up to Date		[] Not Up to Date:  Recent Immunizations:	[] No	[] Yes:    Head Circumference:  Vital Signs Last 24 Hrs  T(C): 36.7 (09 Dec 2019 07:39), Max: 37.7 (08 Dec 2019 19:30)  T(F): 98 (09 Dec 2019 07:39), Max: 99.8 (08 Dec 2019 19:30)  HR: 87 (09 Dec 2019 07:39) (87 - 105)  BP: 158/76 (09 Dec 2019 07:39) (129/71 - 158/76)  BP(mean): --  RR: 17 (09 Dec 2019 07:39) (17 - 18)  SpO2: 99% (09 Dec 2019 07:39) (93% - 99%)    PHYSICAL EXAM    Respiratory Support:		[x] No	[] Yes:  Vasoactive medication infusion:	[x] No	[] Yes:  Venous catheters:		[] No	[x] Yes:  Bladder catheter:		[] No	[x] Yes:  Other catheters or tubes:	[x] No	[] Yes:    PHYSICAL EXAM:  GENERAL: Patient is ill-appearing  HEAD:  Atraumatic, Normocephalic  EYES: Icteric sclera   ENT: Moist mucous membranes  NECK: Supple, No JVD  CHEST/LUNG: Clear to auscultation bilaterally; No rales, rhonchi, wheezing, or rubs. Unlabored respirations  HEART: Regular rate and rhythm; No murmurs, rubs, or gallops  ABDOMEN: Tenderness to palpation of RUQ and RLQ.   EXTREMITIES:  2+ Peripheral Pulses, brisk capillary refill. No clubbing, cyanosis, or edema  MSK: FROM all 4 extremities, full and equal strength  SKIN: No rashes or lesions. Diffuse jaundice noted    Lab Results:                        8.4    14.30 )-----------( 242      ( 09 Dec 2019 06:56 )             26.4         140  |  99  |  10  ----------------------------<  176<H>  3.1<L>   |  22  |  0.8    Ca    8.0<L>      09 Dec 2019 06:56  Mg     2.1         TPro  5.9<L>  /  Alb  2.5<L>  /  TBili  3.6<H>  /  DBili  3.1<H>  /  AST  127<H>  /  ALT  42<H>  /  AlkPhos  222<H>      LA= 2.7    PT/INR - ( 08 Dec 2019 19:50 )   PT: 16.30 sec;   INR: 1.42 ratio      PTT - ( 08 Dec 2019 19:50 )  PTT:32.5 sec  Urinalysis Basic - ( 08 Dec 2019 22:10 )    Color: Light Orange / Appearance: Turbid / S.015 / pH: x  Gluc: x / Ketone: Trace  / Bili: Negative / Urobili: <2 mg/dL   Blood: x / Protein: 30 mg/dL / Nitrite: Negative   Leuk Esterase: Moderate / RBC: Many /HPF / WBC Many /HPF   Sq Epi: x / Non Sq Epi: Few /HPF / Bacteria: Many    from: US Abdomen Limited (19 @ 01:51) >  IMPRESSION:    Cholelithiasis without definite sonographic evidence of acute   cholecystitis.    Innumerable echogenic hepatic lesions consistent with patient's known   metastatic disease better characterized on CT abdomen and pelvis   performed on the same date.     from: CT Abdomen and Pelvis w/ Oral Cont and w/ IV Cont (19 @ 22:57) >  LUNGS/PLEURA/AIRWAYS: Paraseptal and centrilobular emphysema. Innumerable   bilateral pulmonary nodules measuring up to 2.5 x 2.0 cm in the medial   right lower lobe (series 6, image 195). Bilateral dependent atelectatic   change. No focal consolidation, pneumothorax or significant pleural   effusion. No evidence for intraluminal central bronchial lesion.    HEPATOBILIARY: Innumerable hypovascular hepatic masses measuring up to at   least 3.5 cm. Cholelithiasis.    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 Patient is currently being treated with oral chemotherapy.     Patient is presenting for fever for 1 day duration.     CAUTI: patient has a chronic palomo and UA is positive. Palomo catheter was changed on admission.   - dc vanco, cefepime and metronidazole  - start Zosyn 3.375mg q 8 hrs adjusted to CrCl. Covers pseudomonal infections and anerobic coverage  - f/u blood cultures  - f/u Ucx    Elevated liver function tests: from metastatic disease  - US abdomen, CT abdomen - no evidence of cholecystitis/cholangitis and HIDA scan negative, so unlikely acute gallbladder pathology --> however in the setting of fever, RUQ pain and jaundice, will cover anerobs with Zosyn     SOB: likley from [place] to lung. No evidence of pneumonia History obtained from the patients daughter at bedside.     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 Patient is currently being treated with oral chemotherapy.     Patient is presenting for a 1 day history of fever. As per the patients daughter, patient started to complain of Right sided abdominal pain 1 week ago, graded 4/10. Pain was mostly on pressing of the abdomen. He also had an episode of vomiting and nausea. Family members additionally started no notice jaundice yesterday. Review of systems is otherwise positive for SOB and chest pain on deep inspiration.     Review of systems is negative for: diarrhea, blood stool, no headache, photophobia, rashes or skin lesions and no dysphagia. Family have not noticed changes in urine in urine bag, they report no blood in urine since he was discharged in october.     Palomo catheter has not been changed since october     Patient has been recently diagnosed with High grade urothelial carcinoma taking chemo pills 2 pills per day for last 2 months.    REVIEW OF SYSTEMS:    CONSTITUTIONAL: Fever as chills as above  EYES/ENT: No visual changes;  No vertigo or throat pain   NECK: No pain or stiffness  RESPIRATORY: SOB on deep inspiration   CARDIOVASCULAR: No chest pain or palpitations  GASTROINTESTINAL: Right sided abdominal pain  GENITOURINARY: No dysuria, frequency or hematuria. Chronic palomo  SKIN: No itching, rashes    Allergies    No Known Allergies    Intolerances    Antimicrobials:  cefepime   IVPB 1000 milliGRAM(s) IV Intermittent every 8 hours  cefepime   IVPB      metroNIDAZOLE  IVPB 500 milliGRAM(s) IV Intermittent every 8 hours  metroNIDAZOLE  IVPB      vancomycin  IVPB 1000 milliGRAM(s) IV Intermittent every 12 hours      Other Medications:  atorvastatin Oral Tab/Cap - Peds 10 milliGRAM(s) Oral daily  dextrose 40% Gel 15 Gram(s) Oral once PRN  dextrose 5%. 1000 milliLiter(s) IV Continuous <Continuous>  dextrose 50% Injectable 12.5 Gram(s) IV Push once  dextrose 50% Injectable 25 Gram(s) IV Push once  dextrose 50% Injectable 25 Gram(s) IV Push once  dronabinol 2.5 milliGRAM(s) Oral daily  enoxaparin Injectable 40 milliGRAM(s) SubCutaneous at bedtime  finasteride 5 milliGRAM(s) Oral daily  glucagon  Injectable 1 milliGRAM(s) IntraMuscular once PRN  insulin glargine Injectable (LANTUS) 10 Unit(s) SubCutaneous at bedtime  insulin lispro (HumaLOG) corrective regimen sliding scale   SubCutaneous three times a day before meals  multivitamin 1 Tablet(s) Oral daily  oxybutynin 5 milliGRAM(s) Oral daily  potassium chloride  20 mEq/100 mL IVPB 20 milliEquivalent(s) IV Intermittent every 2 hours  predniSONE   Tablet 5 milliGRAM(s) Oral daily  ranolazine 500 milliGRAM(s) Oral two times a day  sodium chloride 0.9%. 1000 milliLiter(s) IV Continuous <Continuous>  tamsulosin Oral Tab/Cap - Peds 0.4 milliGRAM(s) Oral at bedtime      FAMILY HISTORY:    PAST MEDICAL & SURGICAL HISTORY:  Bladder cancer  CAD, multiple vessel  Anemia  PVD (peripheral vascular disease)  Diabetes mellitus: type 2  Hypertension  Prostate CA: with radiation  S/P coronary artery stent placement  H/O hernia repair    SOCIAL HISTORY:    IMMUNIZATIONS  [] Up to Date		[] Not Up to Date:  Recent Immunizations:	[] No	[] Yes:    Head Circumference:  Vital Signs Last 24 Hrs  T(C): 36.7 (09 Dec 2019 07:39), Max: 37.7 (08 Dec 2019 19:30)  T(F): 98 (09 Dec 2019 07:39), Max: 99.8 (08 Dec 2019 19:30)  HR: 87 (09 Dec 2019 07:39) (87 - 105)  BP: 158/76 (09 Dec 2019 07:39) (129/71 - 158/76)  BP(mean): --  RR: 17 (09 Dec 2019 07:39) (17 - 18)  SpO2: 99% (09 Dec 2019 07:39) (93% - 99%)    PHYSICAL EXAM    Respiratory Support:		[x] No	[] Yes:  Vasoactive medication infusion:	[x] No	[] Yes:  Venous catheters:		[] No	[x] Yes:  Bladder catheter:		[] No	[x] Yes:  Other catheters or tubes:	[x] No	[] Yes:    PHYSICAL EXAM:  GENERAL: Patient is ill-appearing  HEAD:  Atraumatic, Normocephalic  EYES: Icteric sclera   ENT: Moist mucous membranes  NECK: Supple, No JVD  CHEST/LUNG: Clear to auscultation bilaterally; No rales, rhonchi, wheezing, or rubs. Unlabored respirations  HEART: Regular rate and rhythm; No murmurs, rubs, or gallops  ABDOMEN: Tenderness to palpation of RUQ and RLQ.   EXTREMITIES:  2+ Peripheral Pulses, brisk capillary refill. No clubbing, cyanosis, or edema  MSK: FROM all 4 extremities, full and equal strength  SKIN: No rashes or lesions. Diffuse jaundice noted    Lab Results:                        8.4    14.30 )-----------( 242      ( 09 Dec 2019 06:56 )             26.4         140  |  99  |  10  ----------------------------<  176<H>  3.1<L>   |  22  |  0.8    Ca    8.0<L>      09 Dec 2019 06:56  Mg     2.1         TPro  5.9<L>  /  Alb  2.5<L>  /  TBili  3.6<H>  /  DBili  3.1<H>  /  AST  127<H>  /  ALT  42<H>  /  AlkPhos  222<H>      LA= 2.7    PT/INR - ( 08 Dec 2019 19:50 )   PT: 16.30 sec;   INR: 1.42 ratio      PTT - ( 08 Dec 2019 19:50 )  PTT:32.5 sec  Urinalysis Basic - ( 08 Dec 2019 22:10 )    Color: Light Orange / Appearance: Turbid / S.015 / pH: x  Gluc: x / Ketone: Trace  / Bili: Negative / Urobili: <2 mg/dL   Blood: x / Protein: 30 mg/dL / Nitrite: Negative   Leuk Esterase: Moderate / RBC: Many /HPF / WBC Many /HPF   Sq Epi: x / Non Sq Epi: Few /HPF / Bacteria: Many    from: US Abdomen Limited (19 @ 01:51) >  IMPRESSION:    Cholelithiasis without definite sonographic evidence of acute   cholecystitis.    Innumerable echogenic hepatic lesions consistent with patient's known   metastatic disease better characterized on CT abdomen and pelvis   performed on the same date.     from: CT Abdomen and Pelvis w/ Oral Cont and w/ IV Cont (19 @ 22:57) >  LUNGS/PLEURA/AIRWAYS: Paraseptal and centrilobular emphysema. Innumerable   bilateral pulmonary nodules measuring up to 2.5 x 2.0 cm in the medial   right lower lobe (series 6, image 195). Bilateral dependent atelectatic   change. No focal consolidation, pneumothorax or significant pleural   effusion. No evidence for intraluminal central bronchial lesion.    HEPATOBILIARY: Innumerable hypovascular hepatic masses measuring up to at   least 3.5 cm. Cholelithiasis.

## 2019-12-09 NOTE — CONSULT NOTE ADULT - ASSESSMENT
73 yo male with PMH of BPH, HTN, DM, CAD, high grade urothelial carcinoma/metastatic comes to the ED for Abdominal pain and fever for 3 days duration.    # Elevated liver enzymes, with abd pain and fever infectious vs biliary tree infectious etiology possible or concurrently occuring  - Imaging shows metastatic disease to the liver consistent with diagnosed disease (patient follows up in [facility])  - currently on chemotherapy  - HIDA scan negative for cholecystitis  - monitor liver enzymes, bili  - MRCP ordered, may aid in observation of biliary tree and show area of possible need for stenting given ho metastatic disease  - c/w antibiotics for now, fever may be free urinary etiology as patient complaining of lower back pain and comes in with positive UA  - pain control      Attending attestation to follow. 75 yo male with PMH of BPH, HTN, DM, CAD, high grade urothelial carcinoma/metastatic comes to the ED for Abdominal pain and fever for 3 days duration.    # Elevated liver enzymes, with abd pain and ? fever at home:  Likely progression of metastatic liver disease,   - Imaging shows metastatic disease to the liver consistent with diagnosed advanced bladder cancer  (patient follows up in [facility])  - currently on chemotherapy  - HIDA scan negative for cholecystitis  - monitor liver enzymes, bili  - check MRCP   - c/w antibiotics for now, fever may be free urinary etiology as patient complaining of lower back pain and comes in with positive UA  - pain control    will follow up 75 yo male with PMH of BPH, HTN, DM, CAD, high grade urothelial carcinoma/metastatic comes to the ED for Abdominal pain and fever for 3 days duration.    # Elevated liver enzymes, with abd pain and ? fever at home:  Likely progression of metastatic liver disease,   - Imaging shows metastatic disease to the liver consistent with diagnosed advanced bladder cancer  (patient follows up in [facility])  - currently on chemotherapy  - HIDA scan negative for cholecystitis  - monitor liver enzymes, bili  - check MRCP   - c/w antibiotics for now as per ID, fever is likely due to cystitis as patient complaining of lower back pain and comes in with positive UA  - pain control    will follow up

## 2019-12-09 NOTE — CHART NOTE - NSCHARTNOTEFT_GEN_A_CORE
Patient has metastatic prostate cancer ( and not bladder cancer) as documented. He is on abiraterone acetate ( Zytiga) which a hormone based chemotherapy given for metastatic castrate resistant prostate cancer ( [facility]). His bladder cancer was localized to the bladder.

## 2019-12-09 NOTE — ED ADULT NURSE REASSESSMENT NOTE - NS ED NURSE REASSESS COMMENT FT1
Patient took his own cancer medication (abiraterone acetate) but threw up right after, patient was wondering if he needs to retake the med, day covering physician notified, will come talk to the physician. Patient took his own cancer medication (abiraterone acetate) but threw up right after, patient was wondering if he needs to retake the med, day covering physician notified, will come talk to the patient.

## 2019-12-09 NOTE — CONSULT NOTE ADULT - ASSESSMENT
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 Patient is currently being treated with oral chemotherapy.     Patient is presenting for fever for 1 day duration.     #Complicated catheter-associated cystiits patient has a chronic palomo and UA is positive. Palomo catheter was changed on admission.   - dc vanco, cefepime and metronidazole  - start Zosyn 3.375mg q 8 hrs adjusted to CrCl. Covers pseudomonal infections and anaerobic coverage  - f/u blood cultures  - f/u Ucx    #Elevated liver function tests: from metastatic disease  - US abdomen, CT abdomen - no evidence of cholecystitis/cholangitis and HIDA scan negative, so unlikely acute gallbladder pathology --> however in the setting of fever, RUQ pain and jaundice, will cover for cholangitis with Zosyn

## 2019-12-09 NOTE — ED ADULT NURSE REASSESSMENT NOTE - NS ED NURSE REASSESS COMMENT FT1
Patient requested his cancer medication to be ordered, spoke with covering physician ( ex [phone]), this will be taking care by day shift physician.

## 2019-12-09 NOTE — CONSULT NOTE ADULT - SUBJECTIVE AND OBJECTIVE BOX
Patient is a 74y old  Male who presents with a chief complaint of Abdominal pain/UTI (09 Dec 2019 11:22)      HPI:  73 yo male with PMH of BPH, HTN, DM, CAD, high grade urothelial carcinoma/metastatic comes to the ED for Abdominal pain and fever for 3 days duration.  pain in lower abdomen, sharp, 4/10, aggravates on straining or pressing lower abdomen.  Fever subjective, low grade, associated with nausea, 1 episode non billious non bloody vomiting, severe loss of appetite, and severe generalized weakness.  patient also complains of dry cough worse on deep inspiration.  No headache, photophobia, chest pain, vertigo, LE edema.    patient has been recently diagnosed with High grade urothelial carcinoma taking chemo pills 2 pills per day for last 2 months.  follows up with Dr Hook at [facility].; recently had PET/CT done; has an appointment there tomorrow.    In the ED, patient Tachycardic, severe generalized weakness, prolonger QT, severe hypokalemia. (09 Dec 2019 03:00)          PAST MEDICAL & SURGICAL HISTORY:  Bladder cancer  CAD, multiple vessel  Anemia  PVD (peripheral vascular disease)  Diabetes mellitus: type 2  Hypertension  Prostate CA: with radiation  S/P coronary artery stent placement  H/O hernia repair      Home Medications:  acetaminophen 325 mg oral tablet: 2 tab(s) orally once, As needed, Mild Pain (1 - 3) (30 Jul 2019 10:18)  atorvastatin 10 mg oral tablet: 1 tab(s) orally once a day (30 Jul 2019 08:07)  finasteride 5 mg oral tablet: 1 tab(s) orally once a day (30 Jul 2019 08:07)  glipiZIDE 5 mg oral tablet: 1 tab(s) orally once a day (30 Jul 2019 08:07)  Janumet 50 mg-500 mg oral tablet:  (30 Jul 2019 08:07)  lisinopril-hydroCHLOROthiazide 20 mg-25 mg oral tablet: 1 tab(s) orally once a day (30 Jul 2019 08:07)  Ranexa 500 mg oral tablet, extended release: 1 tab(s) orally 2 times a day (30 Jul 2019 08:07)      MEDICATIONS  (STANDING):  atorvastatin Oral Tab/Cap - Peds 10 milliGRAM(s) Oral daily  cefepime   IVPB 1000 milliGRAM(s) IV Intermittent every 8 hours  cefepime   IVPB      dextrose 5%. 1000 milliLiter(s) (50 mL/Hr) IV Continuous <Continuous>  dextrose 50% Injectable 12.5 Gram(s) IV Push once  dextrose 50% Injectable 25 Gram(s) IV Push once  dextrose 50% Injectable 25 Gram(s) IV Push once  dronabinol 2.5 milliGRAM(s) Oral daily  enoxaparin Injectable 40 milliGRAM(s) SubCutaneous at bedtime  finasteride 5 milliGRAM(s) Oral daily  insulin glargine Injectable (LANTUS) 10 Unit(s) SubCutaneous at bedtime  insulin lispro (HumaLOG) corrective regimen sliding scale   SubCutaneous three times a day before meals  metroNIDAZOLE  IVPB 500 milliGRAM(s) IV Intermittent every 8 hours  metroNIDAZOLE  IVPB      multivitamin 1 Tablet(s) Oral daily  oxybutynin 5 milliGRAM(s) Oral daily  potassium chloride  20 mEq/100 mL IVPB 20 milliEquivalent(s) IV Intermittent every 2 hours  predniSONE   Tablet 5 milliGRAM(s) Oral daily  ranolazine 500 milliGRAM(s) Oral two times a day  sodium chloride 0.9%. 1000 milliLiter(s) (60 mL/Hr) IV Continuous <Continuous>  tamsulosin Oral Tab/Cap - Peds 0.4 milliGRAM(s) Oral at bedtime  vancomycin  IVPB 1000 milliGRAM(s) IV Intermittent every 12 hours    MEDICATIONS  (PRN):  dextrose 40% Gel 15 Gram(s) Oral once PRN Blood Glucose LESS THAN 70 milliGRAM(s)/deciliter  glucagon  Injectable 1 milliGRAM(s) IntraMuscular once PRN Glucose LESS THAN 70 milligrams/deciliter      Allergies    No Known Allergies    Intolerances        FAMILY HISTORY:      SOCIAL    smoking: denies  etoh:denies  illicit drugs: denies    REVIEW OF SYSTEMS  General: mild fatigue   Skin: no rash   Ophtalmologic: no visual changes   Respiratory: no shortness of breath   Cardiovascular: no chest pain   Gastrointestinal: as per H&P   Genitourinary: no dysuria   Neurological: no weakness   otherwise as described above     Vital Signs Last 24 Hrs  T(C): 36.7 (09 Dec 2019 07:39), Max: 37.7 (08 Dec 2019 19:30)  T(F): 98 (09 Dec 2019 07:39), Max: 99.8 (08 Dec 2019 19:30)  HR: 87 (09 Dec 2019 07:39) (87 - 105)  BP: 158/76 (09 Dec 2019 07:39) (129/71 - 158/76)  BP(mean): --  RR: 17 (09 Dec 2019 07:39) (17 - 18)  SpO2: 99% (09 Dec 2019 07:39) (93% - 99%)    GENERAL:  no distress  SKIN: intact, jaundice  CHEST:   no increased effort, breath sounds clear  HEART:  Regular rhythm, no murmurs, no gallops  ABDOMEN:  distended, with tendeness diffusely, pain elicited on palpation of the right upper quadrant, rebound tenderness positive hypoactive bowel sounds noted,  no masses  EXTREMITIES:  no cyanosis  PSYCHIATRIC: normal affect       CBC Full  -  ( 09 Dec 2019 06:56 )  WBC Count : 14.30 K/uL  RBC Count : 3.00 M/uL  Hemoglobin : 8.4 g/dL  Hematocrit : 26.4 %  Platelet Count - Automated : 242 K/uL  Mean Cell Volume : 88.0 fL  Mean Cell Hemoglobin : 28.0 pg  Mean Cell Hemoglobin Concentration : 31.8 g/dL  Auto Neutrophil # : x  Auto Lymphocyte # : x  Auto Monocyte # : x  Auto Eosinophil # : x  Auto Basophil # : x  Auto Neutrophil % : x  Auto Lymphocyte % : x  Auto Monocyte % : x  Auto Eosinophil % : x  Auto Basophil % : x      Hemoglobin: 8.4 g/dL (12-09-19 @ 06:56)  Bilirubin Total, Serum: 3.6 mg/dL (12-09-19 @ 06:56)  Alanine Aminotransferase (ALT/SGPT): 42 U/L (12-09-19 @ 06:56)  Alkaline Phosphatase, Serum: 222 U/L (12-09-19 @ 06:56)  Bilirubin Direct, Serum: 3.1 mg/dL (12-09-19 @ 06:56)  Aspartate Aminotransferase (AST/SGOT): 127 U/L (12-09-19 @ 06:56)  Alanine Aminotransferase (ALT/SGPT): 44 U/L (12-08-19 @ 19:50)  Alkaline Phosphatase, Serum: 234 U/L (12-08-19 @ 19:50)  Bilirubin Direct, Serum: 3.0 mg/dL (12-08-19 @ 19:50)  Aspartate Aminotransferase (AST/SGOT): 126 U/L (12-08-19 @ 19:50)  Bilirubin Total, Serum: 3.5 mg/dL (12-08-19 @ 19:50)  Hemoglobin: 8.9 g/dL (12-08-19 @ 19:50)  INR: 1.42 ratio (12-08-19 @ 19:50)      PT/INR - ( 08 Dec 2019 19:50 )   PT: 16.30 sec;   INR: 1.42 ratio         PTT - ( 08 Dec 2019 19:50 )  PTT:32.5 sec    12-09    140  |  99  |  10  ----------------------------<  176<H>  3.1<L>   |  22  |  0.8    Ca    8.0<L>      09 Dec 2019 06:56  Mg     2.1     12-09    TPro  5.9<L>  /  Alb  2.5<L>  /  TBili  3.6<H>  /  DBili  3.1<H>  /  AST  127<H>  /  ALT  42<H>  /  AlkPhos  222<H>  12-09        AMYLASE                  12-08 @ 19:50   --  LIPASE                   12-08 @ 19:50  49  HCG  --                    12-08 @ 19:50          RADIOLOGY    CT abd/pelvis IMPRESSION:    No evidence of acute pulmonary embolus.    A portion of the Talamantes catheter balloon appears to be inflated within the   prostatic urethra.    Findings compatible with patient's known metastases including innumerable   pulmonary nodules, liver metastases, hilar and inguinal lymphadenopathy.    No evidence for pulmonary consolidation. No evidence for acute bowel   pathology.    Age-indeterminate T12 mild wedge compression deformity.    Cholelithiasis.      HIDA   Definite visualization of the gallbladder by 30 minutes post injection   demonstrating patency of the cystic duct  Hepatomegaly with multiple focal defects scattered throughout the liver   consistent with known metastases noted on CT Patient is a 74y old  Male who presents with a chief complaint of Abdominal pain/UTI (09 Dec 2019 11:22)      HPI:  73 yo male with PMH of BPH, HTN, DM, CAD, high grade urothelial carcinoma/metastatic comes to the ED for Abdominal pain and fever for 3 days duration.  pain in lower abdomen, sharp, 4/10, aggravates on straining or pressing lower abdomen.  Fever subjective, low grade, associated with nausea, 1 episode non billious non bloody vomiting, severe loss of appetite, and severe generalized weakness.  patient also complains of dry cough worse on deep inspiration.  No headache, photophobia, chest pain, vertigo, LE edema.    patient has been recently diagnosed with High grade urothelial carcinoma taking chemo pills 2 pills per day for last 2 months.  follows up with Dr Hook at [facility].; recently had PET/CT done; has an appointment there tomorrow.    In the ED, patient Tachycardic, severe generalized weakness, prolonger QT, severe hypokalemia. (09 Dec 2019 03:00)          PAST MEDICAL & SURGICAL HISTORY:  Bladder cancer  CAD, multiple vessel  Anemia  PVD (peripheral vascular disease)  Diabetes mellitus: type 2  Hypertension  Prostate CA: with radiation  S/P coronary artery stent placement  H/O hernia repair      Home Medications:  acetaminophen 325 mg oral tablet: 2 tab(s) orally once, As needed, Mild Pain (1 - 3) (30 Jul 2019 10:18)  atorvastatin 10 mg oral tablet: 1 tab(s) orally once a day (30 Jul 2019 08:07)  finasteride 5 mg oral tablet: 1 tab(s) orally once a day (30 Jul 2019 08:07)  glipiZIDE 5 mg oral tablet: 1 tab(s) orally once a day (30 Jul 2019 08:07)  Janumet 50 mg-500 mg oral tablet:  (30 Jul 2019 08:07)  lisinopril-hydroCHLOROthiazide 20 mg-25 mg oral tablet: 1 tab(s) orally once a day (30 Jul 2019 08:07)  Ranexa 500 mg oral tablet, extended release: 1 tab(s) orally 2 times a day (30 Jul 2019 08:07)      MEDICATIONS  (STANDING):  atorvastatin Oral Tab/Cap - Peds 10 milliGRAM(s) Oral daily  cefepime   IVPB 1000 milliGRAM(s) IV Intermittent every 8 hours  cefepime   IVPB      dextrose 5%. 1000 milliLiter(s) (50 mL/Hr) IV Continuous <Continuous>  dextrose 50% Injectable 12.5 Gram(s) IV Push once  dextrose 50% Injectable 25 Gram(s) IV Push once  dextrose 50% Injectable 25 Gram(s) IV Push once  dronabinol 2.5 milliGRAM(s) Oral daily  enoxaparin Injectable 40 milliGRAM(s) SubCutaneous at bedtime  finasteride 5 milliGRAM(s) Oral daily  insulin glargine Injectable (LANTUS) 10 Unit(s) SubCutaneous at bedtime  insulin lispro (HumaLOG) corrective regimen sliding scale   SubCutaneous three times a day before meals  metroNIDAZOLE  IVPB 500 milliGRAM(s) IV Intermittent every 8 hours  metroNIDAZOLE  IVPB      multivitamin 1 Tablet(s) Oral daily  oxybutynin 5 milliGRAM(s) Oral daily  potassium chloride  20 mEq/100 mL IVPB 20 milliEquivalent(s) IV Intermittent every 2 hours  predniSONE   Tablet 5 milliGRAM(s) Oral daily  ranolazine 500 milliGRAM(s) Oral two times a day  sodium chloride 0.9%. 1000 milliLiter(s) (60 mL/Hr) IV Continuous <Continuous>  tamsulosin Oral Tab/Cap - Peds 0.4 milliGRAM(s) Oral at bedtime  vancomycin  IVPB 1000 milliGRAM(s) IV Intermittent every 12 hours    MEDICATIONS  (PRN):  dextrose 40% Gel 15 Gram(s) Oral once PRN Blood Glucose LESS THAN 70 milliGRAM(s)/deciliter  glucagon  Injectable 1 milliGRAM(s) IntraMuscular once PRN Glucose LESS THAN 70 milligrams/deciliter      Allergies    No Known Allergies    Intolerances        FAMILY HISTORY: NC      SOCIAL    smoking: denies  etoh:denies  illicit drugs: denies    REVIEW OF SYSTEMS  General: mild fatigue   Skin: no rash   Ophtalmologic: no visual changes   Respiratory: no shortness of breath   Cardiovascular: no chest pain   Gastrointestinal: as per H&P   Genitourinary: no dysuria   Neurological: no weakness   otherwise as described above     Vital Signs Last 24 Hrs  T(C): 36.7 (09 Dec 2019 07:39), Max: 37.7 (08 Dec 2019 19:30)  T(F): 98 (09 Dec 2019 07:39), Max: 99.8 (08 Dec 2019 19:30)  HR: 87 (09 Dec 2019 07:39) (87 - 105)  BP: 158/76 (09 Dec 2019 07:39) (129/71 - 158/76)  BP(mean): --  RR: 17 (09 Dec 2019 07:39) (17 - 18)  SpO2: 99% (09 Dec 2019 07:39) (93% - 99%)    GENERAL:  no distress  SKIN: intact, jaundice  CHEST:   no increased effort, breath sounds clear  HEART:  Regular rhythm, no murmurs, no gallops  ABDOMEN:  distended, with tendeness diffusely, pain elicited on palpation of the right upper quadrant, rebound tenderness positive hypoactive bowel sounds noted,  no masses  EXTREMITIES:  no cyanosis  PSYCHIATRIC: normal affect       CBC Full  -  ( 09 Dec 2019 06:56 )  WBC Count : 14.30 K/uL  RBC Count : 3.00 M/uL  Hemoglobin : 8.4 g/dL  Hematocrit : 26.4 %  Platelet Count - Automated : 242 K/uL  Mean Cell Volume : 88.0 fL  Mean Cell Hemoglobin : 28.0 pg  Mean Cell Hemoglobin Concentration : 31.8 g/dL  Auto Neutrophil # : x  Auto Lymphocyte # : x  Auto Monocyte # : x  Auto Eosinophil # : x  Auto Basophil # : x  Auto Neutrophil % : x  Auto Lymphocyte % : x  Auto Monocyte % : x  Auto Eosinophil % : x  Auto Basophil % : x      Hemoglobin: 8.4 g/dL (12-09-19 @ 06:56)  Bilirubin Total, Serum: 3.6 mg/dL (12-09-19 @ 06:56)  Alanine Aminotransferase (ALT/SGPT): 42 U/L (12-09-19 @ 06:56)  Alkaline Phosphatase, Serum: 222 U/L (12-09-19 @ 06:56)  Bilirubin Direct, Serum: 3.1 mg/dL (12-09-19 @ 06:56)  Aspartate Aminotransferase (AST/SGOT): 127 U/L (12-09-19 @ 06:56)  Alanine Aminotransferase (ALT/SGPT): 44 U/L (12-08-19 @ 19:50)  Alkaline Phosphatase, Serum: 234 U/L (12-08-19 @ 19:50)  Bilirubin Direct, Serum: 3.0 mg/dL (12-08-19 @ 19:50)  Aspartate Aminotransferase (AST/SGOT): 126 U/L (12-08-19 @ 19:50)  Bilirubin Total, Serum: 3.5 mg/dL (12-08-19 @ 19:50)  Hemoglobin: 8.9 g/dL (12-08-19 @ 19:50)  INR: 1.42 ratio (12-08-19 @ 19:50)      PT/INR - ( 08 Dec 2019 19:50 )   PT: 16.30 sec;   INR: 1.42 ratio         PTT - ( 08 Dec 2019 19:50 )  PTT:32.5 sec    12-09    140  |  99  |  10  ----------------------------<  176<H>  3.1<L>   |  22  |  0.8    Ca    8.0<L>      09 Dec 2019 06:56  Mg     2.1     12-09    TPro  5.9<L>  /  Alb  2.5<L>  /  TBili  3.6<H>  /  DBili  3.1<H>  /  AST  127<H>  /  ALT  42<H>  /  AlkPhos  222<H>  12-09        AMYLASE                  12-08 @ 19:50   --  LIPASE                   12-08 @ 19:50  49  HCG  --                    12-08 @ 19:50          RADIOLOGY    CT abd/pelvis IMPRESSION:    No evidence of acute pulmonary embolus.    A portion of the Talamantes catheter balloon appears to be inflated within the   prostatic urethra.    Findings compatible with patient's known metastases including innumerable   pulmonary nodules, liver metastases, hilar and inguinal lymphadenopathy.    No evidence for pulmonary consolidation. No evidence for acute bowel   pathology.    Age-indeterminate T12 mild wedge compression deformity.    Cholelithiasis.      HIDA   Definite visualization of the gallbladder by 30 minutes post injection   demonstrating patency of the cystic duct  Hepatomegaly with multiple focal defects scattered throughout the liver   consistent with known metastases noted on CT

## 2019-12-09 NOTE — H&P ADULT - ASSESSMENT
Acute complicated UTI in the setting of active urinary bladder cancer with metastasis  will start on levaquin based on urine c/s report from previous growth  follow up blood and urine culture  change palomo catheter  no pyelonephritis on abdominal imaging      Active cancer      transamnitis    DM    CAD    HTN    DVT ppx    GI ppx    Dispo Acute complicated UTI in the setting of active urinary bladder cancer with metastasis:  Vanco and cefepime since recent UTI, bladder cancer, chronic palomo and immunocompromised on chemotherapy  follow up blood and urine culture  change palomo catheter  no pyelonephritis on abdominal imaging  follow up with ID    Prolonged QTc:  s/p magnesium iv  follow up magnesium    severe hypokalemia:  repleted  f/u VBG for potassium  BMP in am    Active bladder cancer:  on chemo pill, does not know the name; daughter is gonna bring tomorrow.  follow up with Dipesh contreras at [facility]  next appointment tomorrow  recent pet/CT done waiting for results  possible metastasis to liver and lung.    Cholelithiasis:  no evidence of choledocholithiasis or cholecystitis.    Transamnitis:  likely from metastatic liver disease  ?chemo    DM  lantus, lispro, sliding scale    CAD  continue home meds    HTN      DVT ppx    GI ppx    Dispo Acute complicated UTI in the setting of active urinary bladder cancer with metastasis:  Vanco and cefepime since recent UTI, bladder cancer, chronic palomo and immunocompromised on chemotherapy  follow up blood and urine culture  change palomo catheter  no pyelonephritis on abdominal imaging  follow up with ID    Prolonged QTc:  s/p magnesium iv  follow up magnesium  repeat EKG    severe hypokalemia:  repleted  f/u VBG for potassium  BMP in am    Active bladder cancer:  on chemo pill, 2 pills daily; does not know the name; daughter is gonna bring tomorrow.  follow up with Dipesh contrreas at [facility]  next appointment tomorrow  recenty pet/CT done waiting for results  possible metastasis to liver and lung.    Cholelithiasis:  no evidence of choledocholithiasis or cholecystitis.    Transamnitis:  likely from metastatic liver disease  ?chemo    DM  lantus, lispro, sliding scale    CAD  continue home meds    HTN  will hold the BP meds since BP stable but tachycardic    DVT ppx  lovenox    GI ppx  protonix    Dispo: acute Acute complicated UTI in the setting of active urinary bladder cancer with metastasis:  Vanco and cefepime since recent UTI, bladder cancer, chronic palomo and immunocompromised on chemotherapy  follow up blood and urine culture  change palomo catheter  no pyelonephritis on abdominal imaging  follow up with ID    Prolonged QTc:  s/p magnesium iv  follow up magnesium  repeat EKG    severe hypokalemia:  repleted  f/u VBG for potassium  BMP in am    Active bladder cancer:  on chemo pill, 2 pills daily; does not know the name; daughter is gonna bring tomorrow.  follow up with Dipesh contreras at [facility]  next appointment tomorrow  recenty pet/CT done waiting for results  possible metastasis to liver and lung.    Cholelithiasis:  no evidence of choledocholithiasis or cholecystitis on imaging  no upper abdominal pain  elevated direct bilirubin and ALP ?transient obstruction Vs metastatic process    Transamnitis:  likely from metastatic liver disease  ?chemo    DM  lantus, lispro, sliding scale    CAD  continue home meds    HTN  will hold the BP meds since BP stable but tachycardic    DVT ppx  lovenox    GI ppx  protonix    Dispo: acute Acute complicated UTI in the setting of active urinary bladder cancer with metastasis:  Vanco and cefepime since recent UTI, bladder cancer, chronic palomo and immunocompromised on chemotherapy  follow up blood and urine culture  change palomo catheter  no pyelonephritis on abdominal imaging  follow up with ID    Prolonged QTc:  s/p magnesium iv  follow up magnesium  repeat EKG    severe hypokalemia:  repleted  f/u VBG for potassium  BMP in am    Active Urinary bladder cancer:  on chemo pill, 2 pills daily; does not know the name; daughter is gonna bring tomorrow.  follow up with Dipesh contreras at [facility]  next appointment tomorrow  recenty pet/CT done waiting for results  possible metastasis to liver and lung.    Cholelithiasis:  no evidence of choledocholithiasis or cholecystitis on imaging  no upper abdominal pain  elevated direct bilirubin and ALP ?transient obstruction Vs metastatic process    Transamnitis:  likely from metastatic liver disease  ?chemo    DM  lantus, lispro, sliding scale    CAD  continue home meds    HTN  will hold the BP meds since BP stable but tachycardic    DVT ppx  lovenox    GI ppx  protonix    Dispo: acute

## 2019-12-10 LAB
ALBUMIN SERPL ELPH-MCNC: 2.2 G/DL — LOW (ref 3.5–5.2)
ALP SERPL-CCNC: 188 U/L — HIGH (ref 30–115)
ALT FLD-CCNC: 33 U/L — SIGNIFICANT CHANGE UP (ref 0–41)
ANION GAP SERPL CALC-SCNC: 16 MMOL/L — HIGH (ref 7–14)
ANION GAP SERPL CALC-SCNC: 18 MMOL/L — HIGH (ref 7–14)
AST SERPL-CCNC: 90 U/L — HIGH (ref 0–41)
BASOPHILS # BLD AUTO: 0.02 K/UL — SIGNIFICANT CHANGE UP (ref 0–0.2)
BASOPHILS # BLD AUTO: 0.03 K/UL — SIGNIFICANT CHANGE UP (ref 0–0.2)
BASOPHILS NFR BLD AUTO: 0.1 % — SIGNIFICANT CHANGE UP (ref 0–1)
BASOPHILS NFR BLD AUTO: 0.2 % — SIGNIFICANT CHANGE UP (ref 0–1)
BILIRUB DIRECT SERPL-MCNC: 3.7 MG/DL — HIGH (ref 0–0.2)
BILIRUB INDIRECT FLD-MCNC: 0.3 MG/DL — SIGNIFICANT CHANGE UP (ref 0.2–1.2)
BILIRUB SERPL-MCNC: 4 MG/DL — HIGH (ref 0.2–1.2)
BLD GP AB SCN SERPL QL: SIGNIFICANT CHANGE UP
BUN SERPL-MCNC: 12 MG/DL — SIGNIFICANT CHANGE UP (ref 10–20)
BUN SERPL-MCNC: 14 MG/DL — SIGNIFICANT CHANGE UP (ref 10–20)
CALCIUM SERPL-MCNC: 7.7 MG/DL — LOW (ref 8.5–10.1)
CALCIUM SERPL-MCNC: 8.1 MG/DL — LOW (ref 8.5–10.1)
CHLORIDE SERPL-SCNC: 100 MMOL/L — SIGNIFICANT CHANGE UP (ref 98–110)
CHLORIDE SERPL-SCNC: 100 MMOL/L — SIGNIFICANT CHANGE UP (ref 98–110)
CO2 SERPL-SCNC: 21 MMOL/L — SIGNIFICANT CHANGE UP (ref 17–32)
CO2 SERPL-SCNC: 22 MMOL/L — SIGNIFICANT CHANGE UP (ref 17–32)
CREAT SERPL-MCNC: 0.7 MG/DL — SIGNIFICANT CHANGE UP (ref 0.7–1.5)
CREAT SERPL-MCNC: 0.8 MG/DL — SIGNIFICANT CHANGE UP (ref 0.7–1.5)
CULTURE RESULTS: SIGNIFICANT CHANGE UP
EOSINOPHIL # BLD AUTO: 0.01 K/UL — SIGNIFICANT CHANGE UP (ref 0–0.7)
EOSINOPHIL # BLD AUTO: 0.02 K/UL — SIGNIFICANT CHANGE UP (ref 0–0.7)
EOSINOPHIL NFR BLD AUTO: 0.1 % — SIGNIFICANT CHANGE UP (ref 0–8)
EOSINOPHIL NFR BLD AUTO: 0.2 % — SIGNIFICANT CHANGE UP (ref 0–8)
GLUCOSE BLDC GLUCOMTR-MCNC: 200 MG/DL — HIGH (ref 70–99)
GLUCOSE BLDC GLUCOMTR-MCNC: 202 MG/DL — HIGH (ref 70–99)
GLUCOSE BLDC GLUCOMTR-MCNC: 206 MG/DL — HIGH (ref 70–99)
GLUCOSE BLDC GLUCOMTR-MCNC: 230 MG/DL — HIGH (ref 70–99)
GLUCOSE SERPL-MCNC: 175 MG/DL — HIGH (ref 70–99)
GLUCOSE SERPL-MCNC: 196 MG/DL — HIGH (ref 70–99)
HCT VFR BLD CALC: 23.6 % — LOW (ref 42–52)
HCT VFR BLD CALC: 24.7 % — LOW (ref 42–52)
HGB BLD-MCNC: 7.7 G/DL — LOW (ref 14–18)
HGB BLD-MCNC: 8.1 G/DL — LOW (ref 14–18)
IMM GRANULOCYTES NFR BLD AUTO: 1 % — HIGH (ref 0.1–0.3)
IMM GRANULOCYTES NFR BLD AUTO: 1.3 % — HIGH (ref 0.1–0.3)
LYMPHOCYTES # BLD AUTO: 1.04 K/UL — LOW (ref 1.2–3.4)
LYMPHOCYTES # BLD AUTO: 18.1 % — LOW (ref 20.5–51.1)
LYMPHOCYTES # BLD AUTO: 2.51 K/UL — SIGNIFICANT CHANGE UP (ref 1.2–3.4)
LYMPHOCYTES # BLD AUTO: 8.3 % — LOW (ref 20.5–51.1)
MAGNESIUM SERPL-MCNC: 1.9 MG/DL — SIGNIFICANT CHANGE UP (ref 1.8–2.4)
MCHC RBC-ENTMCNC: 28.1 PG — SIGNIFICANT CHANGE UP (ref 27–31)
MCHC RBC-ENTMCNC: 28.3 PG — SIGNIFICANT CHANGE UP (ref 27–31)
MCHC RBC-ENTMCNC: 32.6 G/DL — SIGNIFICANT CHANGE UP (ref 32–37)
MCHC RBC-ENTMCNC: 32.8 G/DL — SIGNIFICANT CHANGE UP (ref 32–37)
MCV RBC AUTO: 86.1 FL — SIGNIFICANT CHANGE UP (ref 80–94)
MCV RBC AUTO: 86.4 FL — SIGNIFICANT CHANGE UP (ref 80–94)
MONOCYTES # BLD AUTO: 0.91 K/UL — HIGH (ref 0.1–0.6)
MONOCYTES # BLD AUTO: 1 K/UL — HIGH (ref 0.1–0.6)
MONOCYTES NFR BLD AUTO: 6.6 % — SIGNIFICANT CHANGE UP (ref 1.7–9.3)
MONOCYTES NFR BLD AUTO: 8 % — SIGNIFICANT CHANGE UP (ref 1.7–9.3)
NEUTROPHILS # BLD AUTO: 10.21 K/UL — HIGH (ref 1.4–6.5)
NEUTROPHILS # BLD AUTO: 10.27 K/UL — HIGH (ref 1.4–6.5)
NEUTROPHILS NFR BLD AUTO: 74.1 % — SIGNIFICANT CHANGE UP (ref 42.2–75.2)
NEUTROPHILS NFR BLD AUTO: 82 % — HIGH (ref 42.2–75.2)
NRBC # BLD: 0 /100 WBCS — SIGNIFICANT CHANGE UP (ref 0–0)
NRBC # BLD: 0 /100 WBCS — SIGNIFICANT CHANGE UP (ref 0–0)
PLATELET # BLD AUTO: 227 K/UL — SIGNIFICANT CHANGE UP (ref 130–400)
PLATELET # BLD AUTO: 241 K/UL — SIGNIFICANT CHANGE UP (ref 130–400)
POTASSIUM SERPL-MCNC: 3 MMOL/L — LOW (ref 3.5–5)
POTASSIUM SERPL-MCNC: 3.4 MMOL/L — LOW (ref 3.5–5)
POTASSIUM SERPL-SCNC: 3 MMOL/L — LOW (ref 3.5–5)
POTASSIUM SERPL-SCNC: 3.4 MMOL/L — LOW (ref 3.5–5)
PROT SERPL-MCNC: 5.5 G/DL — LOW (ref 6–8)
RBC # BLD: 2.74 M/UL — LOW (ref 4.7–6.1)
RBC # BLD: 2.86 M/UL — LOW (ref 4.7–6.1)
RBC # FLD: 16.8 % — HIGH (ref 11.5–14.5)
RBC # FLD: 16.9 % — HIGH (ref 11.5–14.5)
SODIUM SERPL-SCNC: 138 MMOL/L — SIGNIFICANT CHANGE UP (ref 135–146)
SODIUM SERPL-SCNC: 139 MMOL/L — SIGNIFICANT CHANGE UP (ref 135–146)
SPECIMEN SOURCE: SIGNIFICANT CHANGE UP
WBC # BLD: 12.46 K/UL — HIGH (ref 4.8–10.8)
WBC # BLD: 13.86 K/UL — HIGH (ref 4.8–10.8)
WBC # FLD AUTO: 12.46 K/UL — HIGH (ref 4.8–10.8)
WBC # FLD AUTO: 13.86 K/UL — HIGH (ref 4.8–10.8)

## 2019-12-10 PROCEDURE — 74181 MRI ABDOMEN W/O CONTRAST: CPT | Mod: 26

## 2019-12-10 PROCEDURE — 99233 SBSQ HOSP IP/OBS HIGH 50: CPT

## 2019-12-10 PROCEDURE — 99497 ADVNCD CARE PLAN 30 MIN: CPT | Mod: 25

## 2019-12-10 PROCEDURE — 93010 ELECTROCARDIOGRAM REPORT: CPT

## 2019-12-10 RX ORDER — POTASSIUM CHLORIDE 20 MEQ
20 PACKET (EA) ORAL
Refills: 0 | Status: COMPLETED | OUTPATIENT
Start: 2019-12-10 | End: 2019-12-10

## 2019-12-10 RX ORDER — POTASSIUM CHLORIDE 20 MEQ
40 PACKET (EA) ORAL EVERY 4 HOURS
Refills: 0 | Status: COMPLETED | OUTPATIENT
Start: 2019-12-10 | End: 2019-12-11

## 2019-12-10 RX ORDER — MAGNESIUM SULFATE 500 MG/ML
2 VIAL (ML) INJECTION ONCE
Refills: 0 | Status: COMPLETED | OUTPATIENT
Start: 2019-12-10 | End: 2019-12-10

## 2019-12-10 RX ORDER — POTASSIUM CHLORIDE 20 MEQ
20 PACKET (EA) ORAL
Refills: 0 | Status: DISCONTINUED | OUTPATIENT
Start: 2019-12-10 | End: 2019-12-10

## 2019-12-10 RX ORDER — LISINOPRIL 2.5 MG/1
20 TABLET ORAL DAILY
Refills: 0 | Status: DISCONTINUED | OUTPATIENT
Start: 2019-12-10 | End: 2019-12-16

## 2019-12-10 RX ADMIN — Medication 5 MILLIGRAM(S): at 05:53

## 2019-12-10 RX ADMIN — Medication 40 MILLIEQUIVALENT(S): at 22:17

## 2019-12-10 RX ADMIN — PIPERACILLIN AND TAZOBACTAM 25 GRAM(S): 4; .5 INJECTION, POWDER, LYOPHILIZED, FOR SOLUTION INTRAVENOUS at 08:09

## 2019-12-10 RX ADMIN — ATORVASTATIN CALCIUM 10 MILLIGRAM(S): 80 TABLET, FILM COATED ORAL at 12:32

## 2019-12-10 RX ADMIN — Medication 20 MILLIEQUIVALENT(S): at 18:59

## 2019-12-10 RX ADMIN — Medication 5 MILLIGRAM(S): at 12:32

## 2019-12-10 RX ADMIN — Medication 50 MILLIEQUIVALENT(S): at 15:55

## 2019-12-10 RX ADMIN — FINASTERIDE 5 MILLIGRAM(S): 5 TABLET, FILM COATED ORAL at 12:32

## 2019-12-10 RX ADMIN — Medication 2: at 17:16

## 2019-12-10 RX ADMIN — LISINOPRIL 20 MILLIGRAM(S): 2.5 TABLET ORAL at 16:02

## 2019-12-10 RX ADMIN — Medication 1 TABLET(S): at 12:32

## 2019-12-10 RX ADMIN — PIPERACILLIN AND TAZOBACTAM 25 GRAM(S): 4; .5 INJECTION, POWDER, LYOPHILIZED, FOR SOLUTION INTRAVENOUS at 22:16

## 2019-12-10 RX ADMIN — PIPERACILLIN AND TAZOBACTAM 25 GRAM(S): 4; .5 INJECTION, POWDER, LYOPHILIZED, FOR SOLUTION INTRAVENOUS at 15:24

## 2019-12-10 RX ADMIN — TAMSULOSIN HYDROCHLORIDE 0.4 MILLIGRAM(S): 0.4 CAPSULE ORAL at 22:16

## 2019-12-10 RX ADMIN — Medication 50 MILLIEQUIVALENT(S): at 12:33

## 2019-12-10 RX ADMIN — INSULIN GLARGINE 10 UNIT(S): 100 INJECTION, SOLUTION SUBCUTANEOUS at 22:16

## 2019-12-10 RX ADMIN — Medication 2.5 MILLIGRAM(S): at 13:47

## 2019-12-10 RX ADMIN — Medication 1: at 12:40

## 2019-12-10 RX ADMIN — ABIRATERONE ACETATE 1000 MILLIGRAM(S): 250 TABLET ORAL at 12:32

## 2019-12-10 RX ADMIN — Medication 20 MILLIEQUIVALENT(S): at 17:36

## 2019-12-10 RX ADMIN — RANOLAZINE 500 MILLIGRAM(S): 500 TABLET, FILM COATED, EXTENDED RELEASE ORAL at 05:53

## 2019-12-10 RX ADMIN — Medication 50 GRAM(S): at 11:26

## 2019-12-10 NOTE — PROGRESS NOTE ADULT - ASSESSMENT
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,lung - on chemo at [facility] Hook [phone]  #hx of prostate CA sp brachytx/RT on zytiga  #sepsis due to UTI - on zosyn per ID- follow up blood culture and urine culture(GNR), ID IVfs  #rule out cholecytitis/cholangitis - liver mets with direct bilirubinemia/transamitis (not present in Oct - reviewed old chart) - HIDA negative - MRCP pending   #transaminitis may be due to zytigal   #DMII - check fs while npo, insulin coverage if >200  CAPILLARY BLOOD GLUCOSE      POCT Blood Glucose.: 202 mg/dL (10 Dec 2019 08:44)  POCT Blood Glucose.: 193 mg/dL (09 Dec 2019 22:16)  POCT Blood Glucose.: 183 mg/dL (09 Dec 2019 17:26)  POCT Blood Glucose.: 199 mg/dL (09 Dec 2019 12:04)    #CAD - no symptoms - cont home meds  #transaminitis - due to biliary obstruction vs chemotx  #chronic palomo/retention - recently changed as outpt - balloon partially inflated in prostatic urethra - advance palomo - discussed with resident   #hypokalemia /Qt prolongation - given magnesium and K+ - still 3.0 - replete and recheck bmp - can dc tele      poor overall prognosis  discussed with daughter at bedside  placed call to Dr Hook at [facility] - awaiting call back . 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,lung - on chemo at [facility] Dr Hook [phone]  #hx of prostate CA sp brachytx/RT on zytiga  #sepsis due to UTI - on zosyn per ID- follow up blood culture and urine culture(GNR), ID IVfs  #rule out cholecytitis/cholangitis - liver mets with direct bilirubinemia/transamitis (not present in Oct - reviewed old chart) - HIDA negative - MRCP pending   #transaminitis may be due to zytigal   #DMII - check fs while npo, insulin coverage if >200  CAPILLARY BLOOD GLUCOSE      POCT Blood Glucose.: 202 mg/dL (10 Dec 2019 08:44)  POCT Blood Glucose.: 193 mg/dL (09 Dec 2019 22:16)  POCT Blood Glucose.: 183 mg/dL (09 Dec 2019 17:26)  POCT Blood Glucose.: 199 mg/dL (09 Dec 2019 12:04)    #CAD - no symptoms - cont home meds  #transaminitis - due to biliary obstruction vs chemotx  #chronic palomo/retention - recently changed as outpt - balloon partially inflated in prostatic urethra - advance palomo - discussed with resident   #hypokalemia /Qt prolongation (also may be due to zytiga) - given magnesium and K+ - still 3.0 - replete and recheck bmp - can dc tele      poor overall prognosis  discussed with daughter at bedside  placed call to Dr Hook at [facility] - awaiting call back . 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,lung - on chemo at [facility] Dr Hook [phone]  #hx of prostate CA sp brachytx/RT on zytiga  #sepsis due to UTI - on zosyn per ID- follow up blood culture and urine culture(GNR), ID IVfs  #rule out cholecytitis/cholangitis - liver mets with direct bilirubinemia/transamitis (not present in Oct - reviewed old chart) - HIDA negative - MRCP pending   #transaminitis may be due to zytigal   #DMII - check fs while npo, insulin coverage if >200  CAPILLARY BLOOD GLUCOSE      POCT Blood Glucose.: 202 mg/dL (10 Dec 2019 08:44)  POCT Blood Glucose.: 193 mg/dL (09 Dec 2019 22:16)  POCT Blood Glucose.: 183 mg/dL (09 Dec 2019 17:26)  POCT Blood Glucose.: 199 mg/dL (09 Dec 2019 12:04)    #CAD - no symptoms - cont home meds  #transaminitis - due to biliary obstruction vs chemotx  #chronic palomo/retention - recently changed as outpt - balloon partially inflated in prostatic urethra - advance palomo - discussed with resident   #hypokalemia /Qt prolongation (also may be due to zytiga) - given magnesium and K+ - still 3.0 - replete and recheck bmp - can dc tele      poor overall prognosis  discussed with daughter at bedside - GOC discussed - full code - all treatment possible at this time  placed call to Dr Hook at [facility] - awaiting call back .

## 2019-12-10 NOTE — PROGRESS NOTE ADULT - SUBJECTIVE AND OBJECTIVE BOX
HOSPITALIST ATTENDING NOTE    EMIGDIO RUBI  74y Male  [id number]    INTERVAL HPI/OVERNIGHT EVENTS: suprapubic pain resolved, +hematuria in palomo still persistent RUQ pain , low grade temp    T(C): 36.6 (12-10-19 @ 07:43), Max: 37.8 (12-09-19 @ 17:37)  HR: 92 (12-10-19 @ 07:43) (92 - 100)  BP: 151/71 (12-10-19 @ 07:43) (151/71 - 171/80)  RR: 18 (12-10-19 @ 07:43) (16 - 18)  SpO2: 98% (12-10-19 @ 07:43) (98% - 98%)  Wt(kg): --    12-09-19 @ 07:01  -  12-10-19 @ 07:00  --------------------------------------------------------  IN: 0 mL / OUT: 950 mL / NET: -950 mL    Pe  nad  jaundiced  [id number]  ctabl  RUQ tenderness to light palpation, +rebound  no suprapubic tenderness  hematuria in palomo  no cce    Consultant(s) Notes Reviewed:  [x ] YES  [ ] NO  Care Discussed with Consultants/Other Providers/ Housestaff [ x] YES  [ ] NO    LABS:                                        7.7    12.46 )-----------( 227      ( 10 Dec 2019 08:22 )             23.6   12-10    139  |  100  |  12  ----------------------------<  175<H>  3.0<L>   |  21  |  0.7    Ca    7.7<L>      10 Dec 2019 08:22  Mg     1.9     12-10    TPro  5.5<L>  /  Alb  2.2<L>  /  TBili  4.0<H>  /  DBili  3.7<H>  /  AST  90<H>  /  ALT  33  /  AlkPhos  188<H>  12-10    < from: 12 Lead ECG (12.09.19 @ 13:00) >  QTC Calculation(Bezet) 472 ms    P Axis 10 degrees    R Axis -23 degrees    T Axis 65 degrees    Diagnosis Line Normal sinus rhythm  Cannot rule out Anterior infarct , age undetermined  Abnormal ECG    < end of copied text >    Culture - Urine (collected 08 Dec 2019 22:10)  Source: .Urine Clean Catch (Midstream)  Preliminary Report (10 Dec 2019 07:39):    >100,000 CFU/ml Gram Negative Rods    Culture - Blood (collected 08 Dec 2019 20:45)  Source: .Blood Blood-Peripheral  Preliminary Report (10 Dec 2019 08:00):    No growth to date.    Culture - Blood (collected 08 Dec 2019 19:50)  Source: .Blood Blood-Peripheral  Preliminary Report (10 Dec 2019 08:00):    No growth to date.          RADIOLOGY & ADDITIONAL TESTS:    Imaging or report Personally Reviewed:  [ ] YES  [ ] NO    Case discussed with resident    Care discussed with pt/family      HEALTH ISSUES - PROBLEM Dx:

## 2019-12-10 NOTE — PROGRESS NOTE ADULT - SUBJECTIVE AND OBJECTIVE BOX
SUBJECTIVE:    Patient is a 74y old Male who presents with a chief complaint of Abdominal pain/UTI (10 Dec 2019 11:37)    Currently admitted to medicine with the primary diagnosis of Weakness     Today is hospital day 1d. This morning he is resting comfortably in bed, noted improvement of his abdominal pain after having a bowel movement. Hematuria in bag today. Still on O2, however not complaining of SOB.     PAST MEDICAL & SURGICAL HISTORY  Bladder cancer  CAD, multiple vessel  Anemia  PVD (peripheral vascular disease)  Diabetes mellitus: type 2  Hypertension  Prostate CA: with radiation  S/P coronary artery stent placement  H/O hernia repair    SOCIAL HISTORY:  Negative for smoking/alcohol/drug use.     ALLERGIES:  No Known Allergies    MEDICATIONS:  STANDING MEDICATIONS  abiraterone 1000 milliGRAM(s) Oral daily  atorvastatin Oral Tab/Cap - Peds 10 milliGRAM(s) Oral daily  dextrose 5%. 1000 milliLiter(s) IV Continuous <Continuous>  dextrose 50% Injectable 12.5 Gram(s) IV Push once  dextrose 50% Injectable 25 Gram(s) IV Push once  dextrose 50% Injectable 25 Gram(s) IV Push once  dronabinol 2.5 milliGRAM(s) Oral daily  enoxaparin Injectable 40 milliGRAM(s) SubCutaneous at bedtime  finasteride 5 milliGRAM(s) Oral daily  insulin glargine Injectable (LANTUS) 10 Unit(s) SubCutaneous at bedtime  insulin lispro (HumaLOG) corrective regimen sliding scale   SubCutaneous three times a day before meals  multivitamin 1 Tablet(s) Oral daily  oxybutynin 5 milliGRAM(s) Oral daily  piperacillin/tazobactam IVPB.. 3.375 Gram(s) IV Intermittent every 8 hours  potassium chloride    Tablet ER 20 milliEquivalent(s) Oral every 2 hours  potassium chloride  20 mEq/100 mL IVPB 20 milliEquivalent(s) IV Intermittent every 2 hours  predniSONE   Tablet 5 milliGRAM(s) Oral daily  ranolazine 500 milliGRAM(s) Oral two times a day  sodium chloride 0.9%. 1000 milliLiter(s) IV Continuous <Continuous>  tamsulosin Oral Tab/Cap - Peds 0.4 milliGRAM(s) Oral at bedtime    PRN MEDICATIONS  dextrose 40% Gel 15 Gram(s) Oral once PRN  glucagon  Injectable 1 milliGRAM(s) IntraMuscular once PRN    VITALS:   T(F): 97.9  HR: 92  BP: 151/71  RR: 18  SpO2: 98%    LABS:                        7.7    12.46 )-----------( 227      ( 10 Dec 2019 08:22 )             23.6     12-10    139  |  100  |  12  ----------------------------<  175<H>  3.0<L>   |  21  |  0.7    Ca    7.7<L>      10 Dec 2019 08:22  Mg     1.9     1210    TPro  5.5<L>  /  Alb  2.2<L>  /  TBili  4.0<H>  /  DBili  3.7<H>  /  AST  90<H>  /  ALT  33  /  AlkPhos  188<H>  1210    PT/INR - ( 08 Dec 2019 19:50 )   PT: 16.30 sec;   INR: 1.42 ratio         PTT - ( 08 Dec 2019 19:50 )  PTT:32.5 sec  Urinalysis Basic - ( 08 Dec 2019 22:10 )    Color: Light Orange / Appearance: Turbid / S.015 / pH: x  Gluc: x / Ketone: Trace  / Bili: Negative / Urobili: <2 mg/dL   Blood: x / Protein: 30 mg/dL / Nitrite: Negative   Leuk Esterase: Moderate / RBC: Many /HPF / WBC Many /HPF   Sq Epi: x / Non Sq Epi: Few /HPF / Bacteria: Many            Culture - Urine (collected 08 Dec 2019 22:10)  Source: .Urine Clean Catch (Midstream)  Preliminary Report (10 Dec 2019 07:39):    >100,000 CFU/ml Gram Negative Rods    Culture - Blood (collected 08 Dec 2019 20:45)  Source: .Blood Blood-Peripheral  Preliminary Report (10 Dec 2019 08:00):    No growth to date.    Culture - Blood (collected 08 Dec 2019 19:50)  Source: .Blood Blood-Peripheral  Preliminary Report (10 Dec 2019 08:00):    No growth to date.      CARDIAC MARKERS ( 08 Dec 2019 22:00 )  x     / 0.02 ng/mL / x     / x     / x          RADIOLOGY:    < from: CT Abdomen and Pelvis w/ Oral Cont and w/ IV Cont (19 @ 22:57) >  IMPRESSION:    No evidence of acute pulmonary embolus.    A portion of the Talamantes catheter balloon appears to be inflated within the   prostatic urethra.    Findings compatible with patient's known metastases including innumerable   pulmonary nodules, liver metastases, hilar and inguinal lymphadenopathy.    No evidence for pulmonary consolidation. No evidence for acute bowel   pathology.    Age-indeterminate T12 mild wedge compression deformity.    Cholelithiasis.      < end of copied text >    < from: US Abdomen Limited (19 @ 01:51) >  IMPRESSION:    Cholelithiasis without definite sonographic evidence of acute   cholecystitis.    Innumerable echogenic hepatic lesions consistent with patient's known   metastatic disease better characterized on CT abdomen and pelvis   performed on the same date.      < end of copied text >          PHYSICAL EXAM:  	GENERAL: NAD, jaundiced, pale, on nasal canula  	HEAD:  Atraumatic, Normocephalic  	EYES: poorly injected conjunctiva and icteric sclera  	NECK: Supple  	CHEST/LUNG: Decrease bibasilar air entry; No wheeze  < from: US Abdomen Limited (19 @ 01:51) >  IMPRESSION:    Cholelithiasis without definite sonographic evidence of acute   cholecystitis.    Innumerable echogenic hepatic lesions consistent with patient's known   metastatic disease better characterized on CT abdomen and pelvis   performed on the same date.      < end of copied text >  	HEART: Regular rate and rhythm  	ABDOMEN: Soft, Nondistended; Bowel sounds present, tenderness in lower abdomen+, right upper quadrant tenderness  	EXTREMITIES: No clubbing, cyanosis, or edema  	PSYCH: AAOx3  	NEUROLOGY: non-focal  SKIN: jaundice

## 2019-12-10 NOTE — PROGRESS NOTE ADULT - ASSESSMENT
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 Patient is currently being treated with oral chemotherapy.   Patient is presenting for fever for 1 day duration.     IMPRESSION  #Complicated catheter-associated cystitis patient has a chronic palomo and UA is positive. Palomo catheter was changed on admission.   - Continue Zosyn 3.375mg q 8 hrs adjusted to CrCl  - f/u Ucx    #Elevated liver function tests: from metastatic disease  - HIDA neg

## 2019-12-10 NOTE — PROGRESS NOTE ADULT - ASSESSMENT
Mr. Milton is a 73 yo male patient with PMH of BPH, HTN, DM, CAD, high grade urothelial carcinoma/metastatic to liver and lungs (following at hospitals) comes to the ED for Abdominal pain and fever for 3 days duration.    # Acute complicated UTI in the setting of high grade urothelial carcinoma and chronic palomo catheter  - Patient with suprapubic pain  - Positive UA  - Urine culture > [id number] GNR  - Blood Culture: NGTD  - no pyelonephritis on abdominal imaging  - On piperaciilin/tazobactam as per ID recommendations    # Hematuria  - Can be due to UTI or mass  - Follow CBC and trasnfuse as needed  - will hold lovenox    # Jaundice (High direct bilirubin)  - Patient with RUQ tenderness  - Known mets to the liver, unclear if getting worse. PET scan done last week (no results as per daughter, she will try to bring the results)  - CT scan + RUQ US: cholelithiasis, no cholecystitis, no CBD dilatation  - HIDA scan: no cholecystitis  - Possible cholangitis? on piperaciilin/tazobactam as per ID recommendations  - Possible DILI, patient on abiraterone that is associated with Increased serum alanine aminotransferase (11% to 46%), increased serum aspartate aminotransferase (15% to 37%), increased serum bilirubin (7% to 16%)  - MRCP pending.   - GI service following    # Prolonged QTc,   - Likely due to hypokalemia (2.3) and hypomagnesemia on presentation  - QTC: > 700 on admission, down to 470 and 500  - Can D.c tele    # Malnutrition, hypoalbuminemia severe hypokalemia:  - replete as needed  - nutrition consult    # High grade urothelial carcinoma + metastasis to liver and lung.  - will continue abiraterone 1000 mg daily as prescribed  - Will continue prednisone 5 mg oral daily  - Follow up with Dipesh hook at [facility]  - recently pet/CT done waiting for results  - Called Dr. Hook office at [phone], no answer, left voice mail again today     # DM  lantus, lispro, sliding scale    # CAD  continue home meds    # HTN  will hold the BP meds since BP was borderline.     DVT ppx  lovenox on hold given hematuria    GI ppx  protonix    Dispo: acute

## 2019-12-10 NOTE — PROGRESS NOTE ADULT - SUBJECTIVE AND OBJECTIVE BOX
SUBJECTIVE:  Patient is feeling better.  No events overnight.  Explains he had a bowel movement.  Pain is improved.      OBJECTIVE:    Vital Signs Last 24 Hrs  T(C): 36.6 (10 Dec 2019 07:43), Max: 37.8 (09 Dec 2019 17:37)  T(F): 97.9 (10 Dec 2019 07:43), Max: 100 (09 Dec 2019 17:37)  HR: 92 (10 Dec 2019 07:43) (92 - 100)  BP: 151/71 (10 Dec 2019 07:43) (151/71 - 171/80)  BP(mean): --  RR: 18 (10 Dec 2019 07:43) (16 - 18)  SpO2: 98% (10 Dec 2019 07:43) (98% - 98%)    PHYSICAL EXAM:  Constitutional: no acute distress  Eyes: no icterus  Neck: no masses, no LAD  Respiratory: normal inspiratory effort; no wheezing or crackles  Cardiovascular: RRR, normal S1/S2, no murmurs/rubs/gallops  Gastrointestinal: distended, tender diffusely, +BS  Extremities: no LE edema  Skin: no rashes, bruises, petechiae, jaundice     LABS:                        7.7    12.46 )-----------( 227      ( 10 Dec 2019 08:22 )             23.6     12-10    139  |  100  |  12  ----------------------------<  175<H>  3.0<L>   |  21  |  0.7    Ca    7.7<L>      10 Dec 2019 08:22  Mg     1.9     12-10    TPro  5.5<L>  /  Alb  2.2<L>  /  TBili  4.0<H>  /  DBili  3.7<H>  /  AST  90<H>  /  ALT  33  /  AlkPhos  188<H>  12-10    PT/INR - ( 08 Dec 2019 19:50 )   PT: 16.30 sec;   INR: 1.42 ratio         PTT - ( 08 Dec 2019 19:50 )  PTT:32.5 sec  LIVER FUNCTIONS - ( 10 Dec 2019 08:22 )  Alb: 2.2 g/dL / Pro: 5.5 g/dL / ALK PHOS: 188 U/L / ALT: 33 U/L / AST: 90 U/L / GGT: x

## 2019-12-10 NOTE — PROGRESS NOTE ADULT - ASSESSMENT
75 yo male with PMH of BPH, HTN, DM, CAD, high grade urothelial carcinoma/metastatic comes to the ED for Abdominal pain and fever for 3 days duration.    # Elevated liver enzymes, with abd pain and ? fever at home:  Likely progression of metastatic liver disease,   patient improved on physical exam today, feeling better, with improvement in liver enzymes, and bowel movements  - Imaging shows metastatic disease to the liver consistent with diagnosed advanced bladder cancer  (patient follows up in [facility])  - currently on chemotherapy  - HIDA scan negative for cholecystitis  - monitor liver enzymes, bili  - check MRCP   - c/w antibiotics for now as per ID, fever is likely due to cystitis   - pain control    # Anemia, normocytic, likely secondary to chronic disease  - c/w hgb monitoring  - no active signs of bleeding      will follow up 75 yo male with PMH of BPH, HTN, DM, CAD, high grade urothelial carcinoma/metastatic comes to the ED for Abdominal pain and fever for 3 days duration.    # Elevated liver enzymes, with abd pain and ? fever at home:  Likely progression of metastatic liver disease,   patient improved on physical exam today, feeling better, with improvement in liver enzymes, and bowel movements  - Imaging shows metastatic disease to the liver consistent with diagnosed advanced bladder cancer  (patient follows up in [facility])  - currently on chemotherapy  - HIDA scan negative for cholecystitis  - monitor liver enzymes, bili  - check MRCP   - c/w antibiotics for now as per ID, fever is likely due to cystitis   - pain control  -Treat constipation     # Anemia, normocytic, likely secondary to chronic disease  - c/w hgb monitoring  - no active signs of bleeding      will follow up

## 2019-12-10 NOTE — PROGRESS NOTE ADULT - SUBJECTIVE AND OBJECTIVE BOX
[facility]  74y, Male  Allergy: No Known Allergies      CHIEF COMPLAINT: Abdominal pain/UTI (10 Dec 2019 13:06)      INTERVAL EVENTS/HPI  - No acute events overnight, UCX GNR  - T(F): , Max: 100 (19 @ 17:37)  - Denies any worsening symptoms  - Tolerating medication  - WBC Count: 12.46 (12-10-19 @ 08:22)  WBC Count: 14.30 (19 @ 06:56)  - Creatinine, Serum: 0.7 (12-10-19 @ 08:22)  Creatinine, Serum: 0.7 (19 @ 16:47)       ROS  General: Denies rigors, nightsweats  HEENT: Denies headache, rhinorrhea, sore throat, eye pain  CV: Denies CP, palpitations  PULM: Denies wheezing, hemoptysis  GI: Denies hematemesis, hematochezia, melena  : Denies discharge, hematuria  MSK: Denies arthralgias, myalgias  SKIN: Denies rash, lesions  NEURO: Denies paresthesias, weakness  PSYCH: Denies depression, anxiety    VITALS:  T(F): 97.9, Max: 100 (19 @ 17:37)  HR: 92  BP: 151/71  RR: 18Vital Signs Last 24 Hrs  T(C): 36.6 (10 Dec 2019 07:43), Max: 37.8 (09 Dec 2019 17:37)  T(F): 97.9 (10 Dec 2019 07:43), Max: 100 (09 Dec 2019 17:37)  HR: 92 (10 Dec 2019 07:43) (92 - 100)  BP: 151/71 (10 Dec 2019 07:43) (151/71 - 171/80)  BP(mean): --  RR: 18 (10 Dec 2019 07:43) (16 - 18)  SpO2: 98% (10 Dec 2019 07:43) (98% - 98%)    PHYSICAL EXAM:  Gen: NAD, resting in bed, icteric  HEENT: Normocephalic, atraumatic  Neck: supple, no lymphadenopathy  CV: Regular rate & regular rhythm  Lungs: decreased BS at bases, no fremitus  Abdomen: Soft, BS present RUQ TTP  Ext: Warm, well perfused  Neuro: non focal, awake  Skin: no rash, no erythema  Lines: no phlebitis    FH: Non-contributory  Social Hx: Non-contributory    TESTS & MEASUREMENTS:                        7.7    12.46 )-----------( 227      ( 10 Dec 2019 08:22 )             23.6     12-10    139  |  100  |  12  ----------------------------<  175<H>  3.0<L>   |  21  |  0.7    Ca    7.7<L>      10 Dec 2019 08:22  Mg     1.9     12-10    TPro  5.5<L>  /  Alb  2.2<L>  /  TBili  4.0<H>  /  DBili  3.7<H>  /  AST  90<H>  /  ALT  33  /  AlkPhos  188<H>  12-10    eGFR if Non African American: 92 mL/min/1.73M2 (12-10-19 @ 08:22)  eGFR if : 106 mL/min/1.73M2 (12-10-19 @ 08:22)  eGFR if Non African American: 92 mL/min/1.73M2 (19 @ 16:47)  eGFR if : 106 mL/min/1.73M2 (19 @ 16:47)    LIVER FUNCTIONS - ( 10 Dec 2019 08:22 )  Alb: 2.2 g/dL / Pro: 5.5 g/dL / ALK PHOS: 188 U/L / ALT: 33 U/L / AST: 90 U/L / GGT: x           Urinalysis Basic - ( 08 Dec 2019 22:10 )    Color: Light Orange / Appearance: Turbid / S.015 / pH: x  Gluc: x / Ketone: Trace  / Bili: Negative / Urobili: <2 mg/dL   Blood: x / Protein: 30 mg/dL / Nitrite: Negative   Leuk Esterase: Moderate / RBC: Many /HPF / WBC Many /HPF   Sq Epi: x / Non Sq Epi: Few /HPF / Bacteria: Many        Culture - Urine (collected 19 @ 22:10)  Source: .Urine Clean Catch (Midstream)  Final Report (12-10-19 @ 12:25):    >=3 organisms. Probable collection contamination.    Culture - Blood (collected 19 @ 20:45)  Source: .Blood Blood-Peripheral  Preliminary Report (12-10-19 @ 08:00):    No growth to date.    Culture - Blood (collected 19 @ 19:50)  Source: .Blood Blood-Peripheral  Preliminary Report (12-10-19 @ 08:00):    No growth to date.        Blood Gas Venous - Lactate: 1.9 mmoL/L (19 @ 02:37)  Lactate, Blood: 2.7 mmol/L (19 @ 19:50)      INFECTIOUS DISEASES TESTING      RADIOLOGY & ADDITIONAL TESTS:  I have personally reviewed the last Chest xray  CXR      CT  CT Abdomen and Pelvis w/ Oral Cont and w/ IV Cont:   EXAM:  CT ANGIO CHEST (W)AW IC        EXAM:  CT ABDOMEN AND PELVIS OC IC            PROCEDURE DATE:  2019            INTERPRETATION:  CLINICAL STATEMENT: Abdominal pain. Shortness of breath.   History of metastatic bladder cancer.    TECHNIQUE: Multislice helical sections were obtained from the thoracic   inlet to the lung bases during rapid administration of 100cc Optiray 320   intravenous contrast using a CTA protocol. Subsequently, axial CT   sections were obtained from the domes of the diaphragms to the pubic   symphysis. Thin sections were reconstructed through the pulmonary   vasculature. Sagittal and coronal reformatted images were acquired, as   well as MIP reconstructed images.    COMPARISON CT: None.    FINDINGS:    CHEST:     PULMONARY EMBOLUS: No evidence of acute pulmonary embolus.    LUNGS/PLEURA/AIRWAYS: Paraseptal and centrilobular emphysema. Innumerable   bilateral pulmonary nodules measuring up to 2.5 x 2.0 cm in the medial   right lower lobe (series 6, image 195). Bilateral dependent atelectatic   change. No focal consolidation, pneumothorax or significant pleural   effusion. No evidence for intraluminal central bronchial lesion.    MEDIASTINUM/THORACIC NODES: Right hilar lymph conglomerate measuring 4.6   x3.4 x 2.8 cm and right infrahilar lymph node conglomerate measuring 3.5   x 2.5 x 3.3 cm. The right hilar and infrahilar lymph nodes mildly narrow   right middle lower lobe rhonchi as well as a segmental branch of the   right upper lobe pulmonary artery, all of which remains patent.    Borderline enlarged subcarinal lymph node measuring 1.5 cm in short axis.   No enlarged axillary lymph nodes. Visualized thyroid gland is   unremarkable.     HEART/GREAT VESSELS: No pericardial effusion. Heart sizeis within normal   limits. The thoracic aorta and main pulmonary artery are normal in   caliber. Diffuse coronary artery calcifications versus stents.      ABDOMEN/PELVIS:    HEPATOBILIARY: Innumerable hypovascular hepatic masses measuring up to at   least 3.5 cm. Cholelithiasis.    SPLEEN: Unremarkable.    PANCREAS: Unremarkable.    ADRENAL GLANDS: Subcentimeter right adrenal gland nodules.    KIDNEYS: Symmetric renal enhancement. No hydronephrosis. 2.5 cm right   renal cyst. Areas of left renalcortical scarring.    ABDOMINOPELVIC NODES: Bilateral enlarged inguinal lymph nodes measuring   up to 2 cm in short axis. Otherwise shotty subcentimeter abdominal pelvic   lymph nodes.    PELVIC ORGANS: Limited evaluation of a thickened urinary bladder   secondary to distention. Prostate brachytherapy seeds. A portion of the   Talamantes catheter balloon appears to be inflated within the prostatic   urethra.    PERITONEUM/MESENTERY/BOWEL: No evidence of bowel obstruction or   pneumoperitoneum. Normal caliber appendix. Trace perihepatic ascites.    BONES/SOFT TISSUES: Mild wedge deformity of the T12 vertebral body, age   indeterminate. Chronic right posterior lateral rib fractures. Mottled   appearance of the bones for which subtle lytic lesions could be missed.    OTHER: . Status post bilateral iliac venous stents. Diffuse   atherosclerotic disease of the abdominal aorta and its branches.      IMPRESSION:    No evidence of acute pulmonary embolus.    A portion of the Talamantes catheter balloon appears to be inflated within the   prostatic urethra.    Findings compatible with patient's known metastases including innumerable   pulmonary nodules, liver metastases, hilar and inguinal lymphadenopathy.    No evidence for pulmonary consolidation. No evidence for acute bowel   pathology.    Age-indeterminate T12 mild wedge compression deformity.    Cholelithiasis.                          DAMARIS WISE M.D., RESIDENT RADIOLOGIST  This document has been electronically signed.  PATRICIA HENNESSY M.D., ATTENDING RADIOLOGIST  This document has been electronically signed. Dec  9 2019 12:02AM             (19 @ 22:57)      CARDIOLOGY TESTING  12 Lead ECG:   Ventricular Rate 86 BPM    Atrial Rate 86 BPM    P-R Interval 212 ms    QRS Duration 106 ms    Q-T Interval 422 ms    QTC Calculation(Bezet) 504 ms    P Axis 20 degrees    R Axis -18 degrees    T Axis 79 degrees    Diagnosis Line Sinus rhythm with 1st degree A-V block  Incomplete left bundle branch block  Nonspecific ST and T wave abnormality  Prolonged QT  Abnormal ECG    Confirmed by LAURA RODRIGEZ MD ([id number]) on 12/10/2019 12:03:10 PM (12-10-19 @ 11:13)  12 Lead ECG:   Ventricular Rate 97 BPM    Atrial Rate 97 BPM    P-R Interval 190 ms    QRS Duration 102 ms    Q-T Interval 372 ms    QTC Calculation(Bezet) 472 ms    P Axis 10 degrees    R Axis -23 degrees    T Axis 65 degrees    Diagnosis Line Normal sinus rhythm  Cannot rule out Anterior infarct , age undetermined  Abnormal ECG    Confirmed by LAURA RODRIGEZ MD ([id number]) on 2019 4:18:14 PM (19 @ 13:00)      MEDICATIONS  abiraterone 1000  atorvastatin Oral Tab/Cap - Peds 10  dextrose 5%. 1000  dextrose 50% Injectable 12.5  dextrose 50% Injectable 25  dextrose 50% Injectable 25  dronabinol 2.5  finasteride 5  insulin glargine Injectable (LANTUS) 10  insulin lispro (HumaLOG) corrective regimen sliding scale   multivitamin 1  oxybutynin 5  piperacillin/tazobactam IVPB.. 3.375  potassium chloride    Tablet ER 20  potassium chloride  20 mEq/100 mL IVPB 20  predniSONE   Tablet 5  ranolazine 500  sodium chloride 0.9%. 1000  tamsulosin Oral Tab/Cap - Peds 0.4      ANTIBIOTICS:  piperacillin/tazobactam IVPB.. 3.375 Gram(s) IV Intermittent every 8 hours      All available historical records have been reviewed

## 2019-12-11 LAB
ALBUMIN SERPL ELPH-MCNC: 2.3 G/DL — LOW (ref 3.5–5.2)
ALP SERPL-CCNC: 169 U/L — HIGH (ref 30–115)
ALT FLD-CCNC: 29 U/L — SIGNIFICANT CHANGE UP (ref 0–41)
ANION GAP SERPL CALC-SCNC: 15 MMOL/L — HIGH (ref 7–14)
AST SERPL-CCNC: 82 U/L — HIGH (ref 0–41)
BASOPHILS # BLD AUTO: 0.02 K/UL — SIGNIFICANT CHANGE UP (ref 0–0.2)
BASOPHILS NFR BLD AUTO: 0.2 % — SIGNIFICANT CHANGE UP (ref 0–1)
BILIRUB DIRECT SERPL-MCNC: 4.3 MG/DL — HIGH (ref 0–0.2)
BILIRUB INDIRECT FLD-MCNC: 0.3 MG/DL — SIGNIFICANT CHANGE UP (ref 0.2–1.2)
BILIRUB SERPL-MCNC: 4.6 MG/DL — HIGH (ref 0.2–1.2)
BUN SERPL-MCNC: 12 MG/DL — SIGNIFICANT CHANGE UP (ref 10–20)
CALCIUM SERPL-MCNC: 7.7 MG/DL — LOW (ref 8.5–10.1)
CHLORIDE SERPL-SCNC: 104 MMOL/L — SIGNIFICANT CHANGE UP (ref 98–110)
CO2 SERPL-SCNC: 21 MMOL/L — SIGNIFICANT CHANGE UP (ref 17–32)
CREAT SERPL-MCNC: 0.8 MG/DL — SIGNIFICANT CHANGE UP (ref 0.7–1.5)
EOSINOPHIL # BLD AUTO: 0.05 K/UL — SIGNIFICANT CHANGE UP (ref 0–0.7)
EOSINOPHIL NFR BLD AUTO: 0.4 % — SIGNIFICANT CHANGE UP (ref 0–8)
GLUCOSE BLDC GLUCOMTR-MCNC: 210 MG/DL — HIGH (ref 70–99)
GLUCOSE BLDC GLUCOMTR-MCNC: 236 MG/DL — HIGH (ref 70–99)
GLUCOSE BLDC GLUCOMTR-MCNC: 244 MG/DL — HIGH (ref 70–99)
GLUCOSE BLDC GLUCOMTR-MCNC: 246 MG/DL — HIGH (ref 70–99)
GLUCOSE SERPL-MCNC: 157 MG/DL — HIGH (ref 70–99)
HCT VFR BLD CALC: 23.8 % — LOW (ref 42–52)
HGB BLD-MCNC: 7.7 G/DL — LOW (ref 14–18)
IMM GRANULOCYTES NFR BLD AUTO: 1.3 % — HIGH (ref 0.1–0.3)
LYMPHOCYTES # BLD AUTO: 1.87 K/UL — SIGNIFICANT CHANGE UP (ref 1.2–3.4)
LYMPHOCYTES # BLD AUTO: 15 % — LOW (ref 20.5–51.1)
MAGNESIUM SERPL-MCNC: 1.9 MG/DL — SIGNIFICANT CHANGE UP (ref 1.8–2.4)
MCHC RBC-ENTMCNC: 28 PG — SIGNIFICANT CHANGE UP (ref 27–31)
MCHC RBC-ENTMCNC: 32.4 G/DL — SIGNIFICANT CHANGE UP (ref 32–37)
MCV RBC AUTO: 86.5 FL — SIGNIFICANT CHANGE UP (ref 80–94)
MONOCYTES # BLD AUTO: 1.17 K/UL — HIGH (ref 0.1–0.6)
MONOCYTES NFR BLD AUTO: 9.4 % — HIGH (ref 1.7–9.3)
NEUTROPHILS # BLD AUTO: 9.22 K/UL — HIGH (ref 1.4–6.5)
NEUTROPHILS NFR BLD AUTO: 73.7 % — SIGNIFICANT CHANGE UP (ref 42.2–75.2)
NRBC # BLD: 0 /100 WBCS — SIGNIFICANT CHANGE UP (ref 0–0)
PLATELET # BLD AUTO: 202 K/UL — SIGNIFICANT CHANGE UP (ref 130–400)
POTASSIUM SERPL-MCNC: 3.7 MMOL/L — SIGNIFICANT CHANGE UP (ref 3.5–5)
POTASSIUM SERPL-SCNC: 3.7 MMOL/L — SIGNIFICANT CHANGE UP (ref 3.5–5)
PROT SERPL-MCNC: 5.4 G/DL — LOW (ref 6–8)
RBC # BLD: 2.75 M/UL — LOW (ref 4.7–6.1)
RBC # FLD: 16.6 % — HIGH (ref 11.5–14.5)
SODIUM SERPL-SCNC: 140 MMOL/L — SIGNIFICANT CHANGE UP (ref 135–146)
WBC # BLD: 12.49 K/UL — HIGH (ref 4.8–10.8)
WBC # FLD AUTO: 12.49 K/UL — HIGH (ref 4.8–10.8)

## 2019-12-11 PROCEDURE — 71045 X-RAY EXAM CHEST 1 VIEW: CPT | Mod: 26

## 2019-12-11 PROCEDURE — 99233 SBSQ HOSP IP/OBS HIGH 50: CPT

## 2019-12-11 PROCEDURE — 99497 ADVNCD CARE PLAN 30 MIN: CPT | Mod: 25

## 2019-12-11 RX ORDER — IPRATROPIUM/ALBUTEROL SULFATE 18-103MCG
3 AEROSOL WITH ADAPTER (GRAM) INHALATION ONCE
Refills: 0 | Status: COMPLETED | OUTPATIENT
Start: 2019-12-11 | End: 2019-12-11

## 2019-12-11 RX ORDER — PHENAZOPYRIDINE HCL 100 MG
200 TABLET ORAL THREE TIMES A DAY
Refills: 0 | Status: COMPLETED | OUTPATIENT
Start: 2019-12-11 | End: 2019-12-13

## 2019-12-11 RX ORDER — FUROSEMIDE 40 MG
40 TABLET ORAL ONCE
Refills: 0 | Status: COMPLETED | OUTPATIENT
Start: 2019-12-11 | End: 2019-12-11

## 2019-12-11 RX ORDER — CHLORHEXIDINE GLUCONATE 213 G/1000ML
1 SOLUTION TOPICAL
Refills: 0 | Status: DISCONTINUED | OUTPATIENT
Start: 2019-12-11 | End: 2019-12-16

## 2019-12-11 RX ORDER — MORPHINE SULFATE 50 MG/1
4 CAPSULE, EXTENDED RELEASE ORAL ONCE
Refills: 0 | Status: DISCONTINUED | OUTPATIENT
Start: 2019-12-11 | End: 2019-12-11

## 2019-12-11 RX ORDER — PHENAZOPYRIDINE HCL 100 MG
200 TABLET ORAL ONCE
Refills: 0 | Status: COMPLETED | OUTPATIENT
Start: 2019-12-11 | End: 2019-12-11

## 2019-12-11 RX ORDER — IPRATROPIUM/ALBUTEROL SULFATE 18-103MCG
3 AEROSOL WITH ADAPTER (GRAM) INHALATION EVERY 6 HOURS
Refills: 0 | Status: DISCONTINUED | OUTPATIENT
Start: 2019-12-11 | End: 2019-12-12

## 2019-12-11 RX ADMIN — FINASTERIDE 5 MILLIGRAM(S): 5 TABLET, FILM COATED ORAL at 11:59

## 2019-12-11 RX ADMIN — Medication 1 TABLET(S): at 11:59

## 2019-12-11 RX ADMIN — Medication 2.5 MILLIGRAM(S): at 11:58

## 2019-12-11 RX ADMIN — Medication 200 MILLIGRAM(S): at 21:19

## 2019-12-11 RX ADMIN — MORPHINE SULFATE 4 MILLIGRAM(S): 50 CAPSULE, EXTENDED RELEASE ORAL at 22:45

## 2019-12-11 RX ADMIN — Medication 3 MILLILITER(S): at 19:27

## 2019-12-11 RX ADMIN — TAMSULOSIN HYDROCHLORIDE 0.4 MILLIGRAM(S): 0.4 CAPSULE ORAL at 21:19

## 2019-12-11 RX ADMIN — Medication 3 MILLILITER(S): at 11:57

## 2019-12-11 RX ADMIN — PIPERACILLIN AND TAZOBACTAM 25 GRAM(S): 4; .5 INJECTION, POWDER, LYOPHILIZED, FOR SOLUTION INTRAVENOUS at 06:30

## 2019-12-11 RX ADMIN — Medication 2: at 12:39

## 2019-12-11 RX ADMIN — Medication 1 TABLET(S): at 17:18

## 2019-12-11 RX ADMIN — Medication 2: at 08:26

## 2019-12-11 RX ADMIN — RANOLAZINE 500 MILLIGRAM(S): 500 TABLET, FILM COATED, EXTENDED RELEASE ORAL at 17:18

## 2019-12-11 RX ADMIN — CHLORHEXIDINE GLUCONATE 1 APPLICATION(S): 213 SOLUTION TOPICAL at 11:59

## 2019-12-11 RX ADMIN — Medication 40 MILLIEQUIVALENT(S): at 03:04

## 2019-12-11 RX ADMIN — Medication 40 MILLIGRAM(S): at 11:58

## 2019-12-11 RX ADMIN — Medication 200 MILLIGRAM(S): at 18:05

## 2019-12-11 RX ADMIN — Medication 2: at 17:17

## 2019-12-11 RX ADMIN — MORPHINE SULFATE 4 MILLIGRAM(S): 50 CAPSULE, EXTENDED RELEASE ORAL at 22:24

## 2019-12-11 RX ADMIN — INSULIN GLARGINE 10 UNIT(S): 100 INJECTION, SOLUTION SUBCUTANEOUS at 21:45

## 2019-12-11 RX ADMIN — ABIRATERONE ACETATE 1000 MILLIGRAM(S): 250 TABLET ORAL at 09:56

## 2019-12-11 RX ADMIN — RANOLAZINE 500 MILLIGRAM(S): 500 TABLET, FILM COATED, EXTENDED RELEASE ORAL at 05:15

## 2019-12-11 RX ADMIN — Medication 5 MILLIGRAM(S): at 05:15

## 2019-12-11 RX ADMIN — ATORVASTATIN CALCIUM 10 MILLIGRAM(S): 80 TABLET, FILM COATED ORAL at 11:59

## 2019-12-11 RX ADMIN — Medication 5 MILLIGRAM(S): at 11:59

## 2019-12-11 RX ADMIN — LISINOPRIL 20 MILLIGRAM(S): 2.5 TABLET ORAL at 05:15

## 2019-12-11 NOTE — PROGRESS NOTE ADULT - ASSESSMENT
Mr. Milton is a 73 yo male patient with PMH of BPH, HTN, DM, CAD, high grade urothelial carcinoma/metastatic to liver and lungs (following at [facility]") comes to the ED for Abdominal pain and fever for 3 days duration.    #Acute complicated UTI in the setting of high grade urothelial carcinoma and chronic palomo catheter  - Positive UA  - UCx 11/08 <49k E fecalis  - BCx 12/08 NGTD  - no pyelonephritis on abdominal imaging  - Per ID, continue Zosyn    #Hematuria  - Can be due to UTI or mass  - Hold Lovenox  - Follow CBC and transfuse as needed    #Jaundice (High direct bilirubin)  - Patient with RUQ tenderness  - Known mets to the liver, unclear if getting worse. PET scan done last week (no results as per daughter, she will try to bring the results)  - CT scan + RUQ US: cholelithiasis, no cholecystitis, no CBD dilatation  - HIDA scan: no cholecystitis  - Possible cholangitis? on piperaciilin/tazobactam as per ID recommendations  - Possible DILI, patient on abiraterone that is associated with Increased serum alanine aminotransferase (11% to 46%), increased serum aspartate aminotransferase (15% to 37%), increased serum bilirubin (7% to 16%)  - MRCP pending.   - GI service following    #Prolonged QTc  - QTc >700 on admission, likely due to hypokalemia and hypomagnesemia  - Down to ~500, telemetry discontinued    #Malnutrition, hypoalbuminemia severe hypokalemia  - Monitor, replete as needed  - Follow up nutrition consult    #High grade urothelial carcinoma with metastasis to liver and lung  - Follows with Dr. Hook ([phone]) at [facility]  - Patient had recent PET/CT, follow up results  - Continue Zytiga  - Continue Prednisone 5mg Daily     #DM  - Continue Lantus 10 qHS, Sliding scale    #CAD  - Continue home meds    #HTN  - Continue Lisinopril 20mg daily    #Diet: Clear Liquids  #GI Prophylaxis: Protonix 40mg PO Daily  #DVT Prophylaxis: Holding Lovenox for hematuria  #Activity: Ambulate as tolerated  #Code Status: DNR/DNI Mr. Milton is a 73 yo male patient with PMH of BPH, HTN, DM, CAD, high grade urothelial carcinoma/metastatic to liver and lungs (following at [facility]) comes to the ED for Abdominal pain and fever for 3 days duration.    #Acute complicated UTI in the setting of high grade urothelial carcinoma and chronic palomo catheter  - Positive UA  - UCx 11/08 <49k E fecalis  - BCx 12/08 NGTD  - no pyelonephritis on abdominal imaging  - Per ID, change Zosyn to PO Augmentin 875mg BID for 7 days    #Hematuria  - Can be due to UTI or mass  - Hold Lovenox  - Follow CBC and transfuse as needed    #Jaundice (High direct bilirubin)  - Patient with RUQ tenderness  - Known mets to the liver, unclear if getting worse. PET scan done last week (no results as per daughter, she will try to bring the results)  - CT scan + RUQ US: cholelithiasis, no cholecystitis, no CBD dilatation  - HIDA scan: no cholecystitis  - Possible cholangitis? on piperaciilin/tazobactam as per ID recommendations  - Possible DILI, patient on abiraterone that is associated with Increased serum alanine aminotransferase (11% to 46%), increased serum aspartate aminotransferase (15% to 37%), increased serum bilirubin (7% to 16%)  - MRCP pending.   - GI service following    #Prolonged QTc  - QTc >700 on admission, likely due to hypokalemia and hypomagnesemia  - Down to ~500, telemetry discontinued    #Malnutrition, hypoalbuminemia severe hypokalemia  - Monitor, replete as needed  - Follow up nutrition consult    #High grade urothelial carcinoma with metastasis to liver and lung  - Follows with Dr. Hook ([phone]) at [facility]  - Patient had recent PET/CT, follow up results  - Continue Zytiga  - Continue Prednisone 5mg Daily     #DM  - Continue Lantus 10 qHS, Sliding scale    #CAD  - Continue home meds    #HTN  - Continue Lisinopril 20mg daily    #Diet: Clear Liquids  #GI Prophylaxis: Protonix 40mg PO Daily  #DVT Prophylaxis: Holding Lovenox for hematuria  #Activity: Ambulate as tolerated  #Code Status: DNR/DNI Mr. Milton is a 75 yo male patient with PMH of BPH, HTN, DM, CAD, high grade urothelial carcinoma/metastatic to liver and lungs (following at [facility]) comes to the ED for Abdominal pain and fever for 3 days duration.    #Acute complicated UTI in the setting of high grade urothelial carcinoma and chronic palomo catheter  - Positive UA  - BCx 12/08 NGTD  - UCx 11/08 <49k E fecalis  - Per ID, change Zosyn to PO Augmentin 875mg BID for 7 days    #Jaundice (High direct bilirubin)  - Patient with RUQ tenderness  - Known mets to the liver, unclear if getting worse. PET scan done last week (no results as per daughter, she will try to bring the results)  - CT scan + RUQ US: cholelithiasis, no cholecystitis, no CBD dilatation  - HIDA scan: no cholecystitis  - MRCP: Cholelithiasis; No choledocholithiasis; No biliary ductal dilation. Hepatic/Lung metastases.    #Malnutrition, hypoalbuminemia severe hypokalemia  - Monitor, replete as needed  - Follow up nutrition consult    #High grade urothelial carcinoma with metastasis to liver and lung  - Follows with Dr. Hook ([phone]) at [facility]  - Patient had recent PET/CT, follow up results  - Continue Zytiga  - Continue Prednisone 5mg Daily    #Prolonged QTc  - QTc >700 on admission, likely due to hypokalemia and hypomagnesemia  - Down to ~500, telemetry discontinued     #DM  - Continue Lantus 10 qHS, Sliding scale    #CAD  - Continue home meds    #HTN  - Continue Lisinopril 20mg daily    #Diet: Clear Liquids  #GI Prophylaxis: Protonix 40mg PO Daily  #DVT Prophylaxis: Holding Lovenox for hematuria  #Activity: Ambulate as tolerated  #Code Status: DNR/DNI

## 2019-12-11 NOTE — PROGRESS NOTE ADULT - ASSESSMENT
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 Patient is currently being treated with oral chemotherapy.   Patient is presenting for fever for 1 day duration.     IMPRESSION  #Complicated catheter-associated cystitis patient has a chronic palomo and UA is positive. Palomo catheter was changed on admission.     UCX contaminant , initially GNR     previous ucx with enterococcus x3  #Elevated liver function tests: from metastatic disease  - HIDA neg  #QTC Calculation(Bezet) 504 ms    RECOMMENDATIONS  - CHANGE to PO augmentin 875mg BID and monitor, plan for 7 days

## 2019-12-11 NOTE — PROGRESS NOTE ADULT - SUBJECTIVE AND OBJECTIVE BOX
Patient Information:  EMIGDIO RUBI / 74y / Male / MRN#:[id number]    Hospital Day: 2d    HPI:  73 yo male with PMH of BPH, HTN, DM, CAD, high grade urothelial carcinoma/metastatic comes to the ED for Abdominal pain and fever for 3 days duration.  pain in lower abdomen, sharp, 4/10, aggravates on straining or pressing lower abdomen.  Fever subjective, low grade, associated with nausea, 1 episode non billious non bloody vomiting, severe loss of appetite, and severe generalized weakness.  patient also complains of dry cough worse on deep inspiration.  No headache, photophobia, chest pain, vertigo, LE edema.    patient has been recently diagnosed with High grade urothelial carcinoma taking chemo pills 2 pills per day for last 2 months.  follows up with Dr Hook at [facility].; recently had PET/CT done; has an appointment there tomorrow.    In the ED, patient Tachycardic, severe generalized weakness, prolonger QT, severe hypokalemia.    Interval History:  Patient seen and examined at bedside. No acute events overnight.    Past Medical History:  Bladder cancer  CAD, multiple vessel  Anemia  PVD (peripheral vascular disease)  Diabetes mellitus  Hypertension  Prostate CA    Past Surgical History:  S/P coronary artery stent placement  H/O hernia repair  No significant past surgical history    Allergies:  No Known Allergies    Medications:  PRN:    Standing:  abiraterone 1000 milliGRAM(s) Oral daily  albuterol/ipratropium for Nebulization 3 milliLiter(s) Nebulizer every 6 hours  atorvastatin Oral Tab/Cap - Peds 10 milliGRAM(s) Oral daily  chlorhexidine 4% Liquid 1 Application(s) Topical <User Schedule>  dronabinol 2.5 milliGRAM(s) Oral daily  finasteride 5 milliGRAM(s) Oral daily  insulin glargine Injectable (LANTUS) 10 Unit(s) SubCutaneous at bedtime  insulin lispro (HumaLOG) corrective regimen sliding scale   SubCutaneous three times a day before meals  lisinopril 20 milliGRAM(s) Oral daily  multivitamin 1 Tablet(s) Oral daily  oxybutynin 5 milliGRAM(s) Oral daily  piperacillin/tazobactam IVPB.. 3.375 Gram(s) IV Intermittent every 8 hours  predniSONE   Tablet 5 milliGRAM(s) Oral daily  ranolazine 500 milliGRAM(s) Oral two times a day  tamsulosin Oral Tab/Cap - Peds 0.4 milliGRAM(s) Oral at bedtime    Home:  acetaminophen 325 mg oral tablet: 2 tab(s) orally once, As needed, Mild Pain (1 - 3)  atorvastatin 10 mg oral tablet: 1 tab(s) orally once a day  finasteride 5 mg oral tablet: 1 tab(s) orally once a day  glipiZIDE 5 mg oral tablet: 1 tab(s) orally once a day  Janumet 50 mg-500 mg oral tablet:   lisinopril-hydroCHLOROthiazide 20 mg-25 mg oral tablet: 1 tab(s) orally once a day  Ranexa 500 mg oral tablet, extended release: 1 tab(s) orally 2 times a day    Vitals:  T(C): 37.1, Max: 37.4 (12-10-19 @ 21:00)  T(F): 98.8, Max: 99.3 (12-10-19 @ 21:00)  HR: 97 (91 - 97)  BP: 168/84 (161/79 - 171/83)  RR: 18 (18 - 18)  SpO2: 94% (94% - 97%)    Physical Exam:  General: Awake, Alert. Jaundiced  Heart: Regular rate and rhythm; S1, S2; No murmurs.  Lungs: Clear to auscultation bilaterally.  Abdomen: Soft, tender  Neuro: AAOx3, No focal deficits.    Labs:  CBC (12-11 @ 06:34)                        Hgb: 7.7    WBC: 12.49 )-----------------( Plts: 202                              Hct: 23.8     Chem (12-11 @ 06:34)  Na: 140  |     Cl: 104     |  BUN: 12  -----------------------------------------< Gluc: 157    K: 3.7   |    HCO3: 21  |  Cr: 0.8    Ca 7.7 (12-11 @ 06:34)  Mg 1.9 (12-11 @ 06:34)    LFTs (12-11 @ 06:34)  TPro 5.4  /  Alb 2.3  TBili 4.6  /  DBili 4.3  AST 82  /  ALT 29  /  AlkPhos 169    Microbiology:  Culture - Urine (collected 12-08 @ 22:10)  Source: .Urine Clean Catch (Midstream)  Final Report (12-10 @ 12:25):    >=3 organisms. Probable collection contamination.    Culture - Blood (collected 12-08 @ 20:45)  Source: .Blood Blood-Peripheral  Preliminary Report (12-10 @ 08:00):    No growth to date.    Culture - Blood (collected 12-08 @ 19:50)  Source: .Blood Blood-Peripheral  Preliminary Report (12-10 @ 08:00):    No growth to date.

## 2019-12-11 NOTE — PROGRESS NOTE ADULT - ATTENDING COMMENTS
I saw and evaluated patient  by bedside, c/o poor appetite, mild dyspnea at rest, patient is very deconditioned, c/o occasional right upper and right lower abdominal pain, hypoxic today, placed on 2 L continuous oxygen tx.   All labs, radiology studies, VS was reviewed  I have reviewed the resident's note and agree with documented findings and  plan of care.  - Progressive stage 4 urothelial carcinoma with metastatic disease to lungs, liver with very poor prognosis, I have discussed Advanced Directives at bedside today, patient has verbalized to be DNR/DNI. continue supportive tx.   - Acute transaminitis due to metastatic liver disease- MRCP- no biliary system obstruction, continue supportive tx.   -Acute Hypoxic respiratory failure - with mild volume OD status on today's cXR with right lower lung opacity- c/w mets dz- patient also smoked sigarettes in past. - started on Duonebs, Oxygen tx. IV lasix tx.  - Complicated UTI - IV abx tx. - to switch to PO abx.   -Malnutrition - continue supportive tx.     Patient's overall prognosis very poor    #Progress Note Handoff: continue abx tx. , monitor pulse ox, resumed on lasix tx. for pulm. congestion, GOC discussion   Family discussion: medical problems and plan was d/w patient and patient's daughter by bedside Disposition: to be determined

## 2019-12-11 NOTE — GOALS OF CARE CONVERSATION - ADVANCED CARE PLANNING - CONVERSATION DETAILS
I had a detailed discussion of patient's medical condition with patient and patient's daughter, with progressive metastatic disease, acute hypoxic respiratory failure, mets to liver and lung, patient has very poor prognosis. Advanced directives d/w pt. he voiced to be DNR/DNI. MOLST form was filled out for DNR/DNI - with two witnesses today.   Patient wants to continue with aggressive medical therapy for now.

## 2019-12-11 NOTE — PROGRESS NOTE ADULT - SUBJECTIVE AND OBJECTIVE BOX
EMIGDIO RUBI  74y, Male  Allergy: No Known Allergies      CHIEF COMPLAINT: Abdominal pain/UTI (10 Dec 2019 13:38)      INTERVAL EVENTS/HPI  - No acute events overnight, UCX contaminant   - T(F): , Max: 99.3 (12-10-19 @ 21:00)  - Denies any worsening symptoms  - Tolerating medication  - WBC Count: 12.49 (12-11-19 @ 06:34)  WBC Count: 13.86 (12-10-19 @ 16:52)  - Creatinine, Serum: 0.8 (12-11-19 @ 06:34)  Creatinine, Serum: 0.8 (12-10-19 @ 16:52)       ROS  General: Denies rigors, nightsweats  HEENT: Denies headache, rhinorrhea, sore throat, eye pain  CV: Denies CP, palpitations  PULM: Denies wheezing, hemoptysis  GI: Denies hematemesis, hematochezia, melena  : Denies discharge, hematuria  MSK: Denies arthralgias, myalgias  SKIN: Denies rash, lesions  NEURO: Denies paresthesias, weakness  PSYCH: Denies depression, anxiety    VITALS:  T(F): 98.8, Max: 99.3 (12-10-19 @ 21:00)  HR: 97  BP: 168/84  RR: 18Vital Signs Last 24 Hrs  T(C): 37.1 (11 Dec 2019 04:56), Max: 37.4 (10 Dec 2019 21:00)  T(F): 98.8 (11 Dec 2019 04:56), Max: 99.3 (10 Dec 2019 21:00)  HR: 97 (11 Dec 2019 04:56) (91 - 97)  BP: 168/84 (11 Dec 2019 04:56) (161/79 - 171/83)  BP(mean): --  RR: 18 (11 Dec 2019 04:56) (18 - 18)  SpO2: 94% (11 Dec 2019 08:00) (94% - 97%)    PHYSICAL EXAM:  Gen: NAD, resting in bed, icteric  HEENT: Normocephalic, atraumatic  Neck: supple, no lymphadenopathy  CV: Regular rate & regular rhythm  Lungs: decreased BS at bases, no fremitus  Abdomen: Soft, BS present RUQ TTP  Ext: Warm, well perfused  Neuro: non focal, awake  Skin: no rash, no erythema  Lines: no phlebitis      FH: Non-contributory  Social Hx: Non-contributory    TESTS & MEASUREMENTS:                        7.7    12.49 )-----------( 202      ( 11 Dec 2019 06:34 )             23.8     12-11    140  |  104  |  12  ----------------------------<  157<H>  3.7   |  21  |  0.8    Ca    7.7<L>      11 Dec 2019 06:34  Mg     1.9     12-11    TPro  5.4<L>  /  Alb  2.3<L>  /  TBili  4.6<H>  /  DBili  4.3<H>  /  AST  82<H>  /  ALT  29  /  AlkPhos  169<H>  12-11    eGFR if Non African American: 87 mL/min/1.73M2 (12-11-19 @ 06:34)  eGFR if African American: 101 mL/min/1.73M2 (12-11-19 @ 06:34)  eGFR if Non African American: 87 mL/min/1.73M2 (12-10-19 @ 16:52)  eGFR if : 101 mL/min/1.73M2 (12-10-19 @ 16:52)    LIVER FUNCTIONS - ( 11 Dec 2019 06:34 )  Alb: 2.3 g/dL / Pro: 5.4 g/dL / ALK PHOS: 169 U/L / ALT: 29 U/L / AST: 82 U/L / GGT: x               Culture - Urine (collected 12-08-19 @ 22:10)  Source: .Urine Clean Catch (Midstream)  Final Report (12-10-19 @ 12:25):    >=3 organisms. Probable collection contamination.    Culture - Blood (collected 12-08-19 @ 20:45)  Source: .Blood Blood-Peripheral  Preliminary Report (12-10-19 @ 08:00):    No growth to date.    Culture - Blood (collected 12-08-19 @ 19:50)  Source: .Blood Blood-Peripheral  Preliminary Report (12-10-19 @ 08:00):    No growth to date.        Blood Gas Venous - Lactate: 1.9 mmoL/L (12-09-19 @ 02:37)  Lactate, Blood: 2.7 mmol/L (12-08-19 @ 19:50)      INFECTIOUS DISEASES TESTING      RADIOLOGY & ADDITIONAL TESTS:  I have personally reviewed the last Chest xray  CXR      CT  CT Abdomen and Pelvis w/ Oral Cont and w/ IV Cont:   EXAM:  CT ANGIO CHEST (W)AW IC        EXAM:  CT ABDOMEN AND PELVIS OC IC            PROCEDURE DATE:  12/08/2019            INTERPRETATION:  CLINICAL STATEMENT: Abdominal pain. Shortness of breath.   History of metastatic bladder cancer.    TECHNIQUE: Multislice helical sections were obtained from the thoracic   inlet to the lung bases during rapid administration of 100cc Optiray 320   intravenous contrast using a CTA protocol. Subsequently, axial CT   sections were obtained from the domes of the diaphragms to the pubic   symphysis. Thin sections were reconstructed through the pulmonary   vasculature. Sagittal and coronal reformatted images were acquired, as   well as MIP reconstructed images.    COMPARISON CT: None.    FINDINGS:    CHEST:     PULMONARY EMBOLUS: No evidence of acute pulmonary embolus.    LUNGS/PLEURA/AIRWAYS: Paraseptal and centrilobular emphysema. Innumerable   bilateral pulmonary nodules measuring up to 2.5 x 2.0 cm in the medial   right lower lobe (series 6, image 195). Bilateral dependent atelectatic   change. No focal consolidation, pneumothorax or significant pleural   effusion. No evidence for intraluminal central bronchial lesion.    MEDIASTINUM/THORACIC NODES: Right hilar lymph conglomerate measuring 4.6   x3.4 x 2.8 cm and right infrahilar lymph node conglomerate measuring 3.5   x 2.5 x 3.3 cm. The right hilar and infrahilar lymph nodes mildly narrow   right middle lower lobe rhonchi as well as a segmental branch of the   right upper lobe pulmonary artery, all of which remains patent.    Borderline enlarged subcarinal lymph node measuring 1.5 cm in short axis.   No enlarged axillary lymph nodes. Visualized thyroid gland is   unremarkable.     HEART/GREAT VESSELS: No pericardial effusion. Heart sizeis within normal   limits. The thoracic aorta and main pulmonary artery are normal in   caliber. Diffuse coronary artery calcifications versus stents.      ABDOMEN/PELVIS:    HEPATOBILIARY: Innumerable hypovascular hepatic masses measuring up to at   least 3.5 cm. Cholelithiasis.    SPLEEN: Unremarkable.    PANCREAS: Unremarkable.    ADRENAL GLANDS: Subcentimeter right adrenal gland nodules.    KIDNEYS: Symmetric renal enhancement. No hydronephrosis. 2.5 cm right   renal cyst. Areas of left renalcortical scarring.    ABDOMINOPELVIC NODES: Bilateral enlarged inguinal lymph nodes measuring   up to 2 cm in short axis. Otherwise shotty subcentimeter abdominal pelvic   lymph nodes.    PELVIC ORGANS: Limited evaluation of a thickened urinary bladder   secondary to distention. Prostate brachytherapy seeds. A portion of the   Talamantes catheter balloon appears to be inflated within the prostatic   urethra.    PERITONEUM/MESENTERY/BOWEL: No evidence of bowel obstruction or   pneumoperitoneum. Normal caliber appendix. Trace perihepatic ascites.    BONES/SOFT TISSUES: Mild wedge deformity of the T12 vertebral body, age   indeterminate. Chronic right posterior lateral rib fractures. Mottled   appearance of the bones for which subtle lytic lesions could be missed.    OTHER: . Status post bilateral iliac venous stents. Diffuse   atherosclerotic disease of the abdominal aorta and its branches.      IMPRESSION:    No evidence of acute pulmonary embolus.    A portion of the Talamantes catheter balloon appears to be inflated within the   prostatic urethra.    Findings compatible with patient's known metastases including innumerable   pulmonary nodules, liver metastases, hilar and inguinal lymphadenopathy.    No evidence for pulmonary consolidation. No evidence for acute bowel   pathology.    Age-indeterminate T12 mild wedge compression deformity.    Cholelithiasis.                          DAMARIS WISE M.D., RESIDENT RADIOLOGIST  This document has been electronically signed.  PATRICIA HENNESSY M.D., ATTENDING RADIOLOGIST  This document has been electronically signed. Dec  9 2019 12:02AM             (12-08-19 @ 22:57)      CARDIOLOGY TESTING  12 Lead ECG:   Ventricular Rate 86 BPM    Atrial Rate 86 BPM    P-R Interval 212 ms    QRS Duration 106 ms    Q-T Interval 422 ms    QTC Calculation(Bezet) 504 ms    P Axis 20 degrees    R Axis -18 degrees    T Axis 79 degrees    Diagnosis Line Sinus rhythm with 1st degree A-V block  Incomplete left bundle branch block  Nonspecific ST and T wave abnormality  Prolonged QT  Abnormal ECG    Confirmed by LAURA RODRIGEZ MD ([id number]) on 12/10/2019 12:03:10 PM (12-10-19 @ 11:13)  12 Lead ECG:   Ventricular Rate 97 BPM    Atrial Rate 97 BPM    P-R Interval 190 ms    QRS Duration 102 ms    Q-T Interval 372 ms    QTC Calculation(Bezet) 472 ms    P Axis 10 degrees    R Axis -23 degrees    T Axis 65 degrees    Diagnosis Line Normal sinus rhythm  Cannot rule out Anterior infarct , age undetermined  Abnormal ECG    Confirmed by LAURA RODRIGEZ MD ([id number]) on 12/9/2019 4:18:14 PM (12-09-19 @ 13:00)      MEDICATIONS  abiraterone 1000  atorvastatin Oral Tab/Cap - Peds 10  chlorhexidine 4% Liquid 1  dronabinol 2.5  finasteride 5  insulin glargine Injectable (LANTUS) 10  insulin lispro (HumaLOG) corrective regimen sliding scale   lisinopril 20  multivitamin 1  oxybutynin 5  piperacillin/tazobactam IVPB.. 3.375  predniSONE   Tablet 5  ranolazine 500  sodium chloride 0.9%. 1000  tamsulosin Oral Tab/Cap - Peds 0.4      ANTIBIOTICS:  piperacillin/tazobactam IVPB.. 3.375 Gram(s) IV Intermittent every 8 hours      All available historical records have been reviewed

## 2019-12-12 LAB
ALBUMIN SERPL ELPH-MCNC: 2.3 G/DL — LOW (ref 3.5–5.2)
ALP SERPL-CCNC: 185 U/L — HIGH (ref 30–115)
ALT FLD-CCNC: 37 U/L — SIGNIFICANT CHANGE UP (ref 0–41)
ANION GAP SERPL CALC-SCNC: 17 MMOL/L — HIGH (ref 7–14)
AST SERPL-CCNC: 119 U/L — HIGH (ref 0–41)
BASOPHILS # BLD AUTO: 0.04 K/UL — SIGNIFICANT CHANGE UP (ref 0–0.2)
BASOPHILS NFR BLD AUTO: 0.3 % — SIGNIFICANT CHANGE UP (ref 0–1)
BILIRUB SERPL-MCNC: 4.4 MG/DL — HIGH (ref 0.2–1.2)
BUN SERPL-MCNC: 11 MG/DL — SIGNIFICANT CHANGE UP (ref 10–20)
CALCIUM SERPL-MCNC: 7.7 MG/DL — LOW (ref 8.5–10.1)
CHLORIDE SERPL-SCNC: 99 MMOL/L — SIGNIFICANT CHANGE UP (ref 98–110)
CO2 SERPL-SCNC: 22 MMOL/L — SIGNIFICANT CHANGE UP (ref 17–32)
CREAT SERPL-MCNC: 0.7 MG/DL — SIGNIFICANT CHANGE UP (ref 0.7–1.5)
EOSINOPHIL # BLD AUTO: 0.1 K/UL — SIGNIFICANT CHANGE UP (ref 0–0.7)
EOSINOPHIL NFR BLD AUTO: 0.7 % — SIGNIFICANT CHANGE UP (ref 0–8)
GLUCOSE BLDC GLUCOMTR-MCNC: 173 MG/DL — HIGH (ref 70–99)
GLUCOSE BLDC GLUCOMTR-MCNC: 198 MG/DL — HIGH (ref 70–99)
GLUCOSE BLDC GLUCOMTR-MCNC: 200 MG/DL — HIGH (ref 70–99)
GLUCOSE BLDC GLUCOMTR-MCNC: 212 MG/DL — HIGH (ref 70–99)
GLUCOSE SERPL-MCNC: 162 MG/DL — HIGH (ref 70–99)
HCT VFR BLD CALC: 25 % — LOW (ref 42–52)
HGB BLD-MCNC: 8 G/DL — LOW (ref 14–18)
IMM GRANULOCYTES NFR BLD AUTO: 1.3 % — HIGH (ref 0.1–0.3)
LYMPHOCYTES # BLD AUTO: 19.2 % — LOW (ref 20.5–51.1)
LYMPHOCYTES # BLD AUTO: 2.81 K/UL — SIGNIFICANT CHANGE UP (ref 1.2–3.4)
MAGNESIUM SERPL-MCNC: 1.6 MG/DL — LOW (ref 1.8–2.4)
MCHC RBC-ENTMCNC: 27.8 PG — SIGNIFICANT CHANGE UP (ref 27–31)
MCHC RBC-ENTMCNC: 32 G/DL — SIGNIFICANT CHANGE UP (ref 32–37)
MCV RBC AUTO: 86.8 FL — SIGNIFICANT CHANGE UP (ref 80–94)
MONOCYTES # BLD AUTO: 1.38 K/UL — HIGH (ref 0.1–0.6)
MONOCYTES NFR BLD AUTO: 9.4 % — HIGH (ref 1.7–9.3)
NEUTROPHILS # BLD AUTO: 10.12 K/UL — HIGH (ref 1.4–6.5)
NEUTROPHILS NFR BLD AUTO: 69.1 % — SIGNIFICANT CHANGE UP (ref 42.2–75.2)
NRBC # BLD: 0 /100 WBCS — SIGNIFICANT CHANGE UP (ref 0–0)
PLATELET # BLD AUTO: 184 K/UL — SIGNIFICANT CHANGE UP (ref 130–400)
POTASSIUM SERPL-MCNC: 3 MMOL/L — LOW (ref 3.5–5)
POTASSIUM SERPL-SCNC: 3 MMOL/L — LOW (ref 3.5–5)
PROT SERPL-MCNC: 5.6 G/DL — LOW (ref 6–8)
RBC # BLD: 2.88 M/UL — LOW (ref 4.7–6.1)
RBC # FLD: 16.7 % — HIGH (ref 11.5–14.5)
SODIUM SERPL-SCNC: 138 MMOL/L — SIGNIFICANT CHANGE UP (ref 135–146)
WBC # BLD: 14.64 K/UL — HIGH (ref 4.8–10.8)
WBC # FLD AUTO: 14.64 K/UL — HIGH (ref 4.8–10.8)

## 2019-12-12 PROCEDURE — 99233 SBSQ HOSP IP/OBS HIGH 50: CPT

## 2019-12-12 RX ORDER — ABIRATERONE ACETATE 250 MG/1
1000 TABLET ORAL DAILY
Refills: 0 | Status: DISCONTINUED | OUTPATIENT
Start: 2019-12-12 | End: 2019-12-12

## 2019-12-12 RX ORDER — IPRATROPIUM/ALBUTEROL SULFATE 18-103MCG
3 AEROSOL WITH ADAPTER (GRAM) INHALATION EVERY 6 HOURS
Refills: 0 | Status: DISCONTINUED | OUTPATIENT
Start: 2019-12-12 | End: 2019-12-15

## 2019-12-12 RX ORDER — FUROSEMIDE 40 MG
40 TABLET ORAL DAILY
Refills: 0 | Status: DISCONTINUED | OUTPATIENT
Start: 2019-12-12 | End: 2019-12-16

## 2019-12-12 RX ORDER — ABIRATERONE ACETATE 250 MG/1
1000 TABLET ORAL DAILY
Refills: 0 | Status: DISCONTINUED | OUTPATIENT
Start: 2019-12-13 | End: 2019-12-16

## 2019-12-12 RX ORDER — MAGNESIUM SULFATE 500 MG/ML
2 VIAL (ML) INJECTION ONCE
Refills: 0 | Status: DISCONTINUED | OUTPATIENT
Start: 2019-12-12 | End: 2019-12-12

## 2019-12-12 RX ORDER — MAGNESIUM SULFATE 500 MG/ML
2 VIAL (ML) INJECTION ONCE
Refills: 0 | Status: COMPLETED | OUTPATIENT
Start: 2019-12-12 | End: 2019-12-12

## 2019-12-12 RX ORDER — POTASSIUM CHLORIDE 20 MEQ
20 PACKET (EA) ORAL
Refills: 0 | Status: COMPLETED | OUTPATIENT
Start: 2019-12-12 | End: 2019-12-12

## 2019-12-12 RX ADMIN — Medication 1: at 08:17

## 2019-12-12 RX ADMIN — TAMSULOSIN HYDROCHLORIDE 0.4 MILLIGRAM(S): 0.4 CAPSULE ORAL at 21:40

## 2019-12-12 RX ADMIN — Medication 3 MILLILITER(S): at 13:43

## 2019-12-12 RX ADMIN — Medication 50 MILLIEQUIVALENT(S): at 09:20

## 2019-12-12 RX ADMIN — RANOLAZINE 500 MILLIGRAM(S): 500 TABLET, FILM COATED, EXTENDED RELEASE ORAL at 05:18

## 2019-12-12 RX ADMIN — Medication 40 MILLIGRAM(S): at 21:40

## 2019-12-12 RX ADMIN — Medication 2.5 MILLIGRAM(S): at 12:20

## 2019-12-12 RX ADMIN — FINASTERIDE 5 MILLIGRAM(S): 5 TABLET, FILM COATED ORAL at 12:20

## 2019-12-12 RX ADMIN — INSULIN GLARGINE 10 UNIT(S): 100 INJECTION, SOLUTION SUBCUTANEOUS at 21:41

## 2019-12-12 RX ADMIN — Medication 200 MILLIGRAM(S): at 05:18

## 2019-12-12 RX ADMIN — Medication 1: at 12:18

## 2019-12-12 RX ADMIN — Medication 50 MILLIEQUIVALENT(S): at 13:08

## 2019-12-12 RX ADMIN — Medication 1 TABLET(S): at 12:20

## 2019-12-12 RX ADMIN — Medication 5 MILLIGRAM(S): at 05:18

## 2019-12-12 RX ADMIN — ATORVASTATIN CALCIUM 10 MILLIGRAM(S): 80 TABLET, FILM COATED ORAL at 12:20

## 2019-12-12 RX ADMIN — RANOLAZINE 500 MILLIGRAM(S): 500 TABLET, FILM COATED, EXTENDED RELEASE ORAL at 20:03

## 2019-12-12 RX ADMIN — CHLORHEXIDINE GLUCONATE 1 APPLICATION(S): 213 SOLUTION TOPICAL at 05:18

## 2019-12-12 RX ADMIN — Medication 50 GRAM(S): at 09:19

## 2019-12-12 RX ADMIN — Medication 1 TABLET(S): at 20:03

## 2019-12-12 RX ADMIN — LISINOPRIL 20 MILLIGRAM(S): 2.5 TABLET ORAL at 05:18

## 2019-12-12 RX ADMIN — ABIRATERONE ACETATE 1000 MILLIGRAM(S): 250 TABLET ORAL at 08:48

## 2019-12-12 RX ADMIN — Medication 1 TABLET(S): at 05:18

## 2019-12-12 RX ADMIN — Medication 5 MILLIGRAM(S): at 12:21

## 2019-12-12 RX ADMIN — Medication 200 MILLIGRAM(S): at 13:11

## 2019-12-12 RX ADMIN — Medication 3 MILLILITER(S): at 22:42

## 2019-12-12 RX ADMIN — Medication 200 MILLIGRAM(S): at 21:40

## 2019-12-12 NOTE — DIETITIAN INITIAL EVALUATION ADULT. - RD TO REMAIN AVAILABLE
yes/RD to monitor diet order, energy intake, body composition, NFPF, electrolyte profile, glucose profile

## 2019-12-12 NOTE — DIETITIAN INITIAL EVALUATION ADULT. - ENERGY NEEDS
calorie 2040-2210kcal (MSJ x 1.2-1.3 AF) for elderly, overweight pt. with metastatic CA  protein 92-110g (1-1.2g/kg CBW)  fluid 1ml/kcal or per LIP

## 2019-12-12 NOTE — DIETITIAN INITIAL EVALUATION ADULT. - FACTORS AFF FOOD INTAKE
abd discomfort, was constipated for several days- now resolving, had BM yesterday, denies chew/swallow difficulty, nausea occasionally, felt like vomiting during breakfast today. Pt. states he's weak and uninterested in food

## 2019-12-12 NOTE — DIETITIAN INITIAL EVALUATION ADULT. - OTHER INFO
P/w: Abdominal pain/UTI. Acute complicated UTI in the setting of high grade urothelial carcinoma and chronic palomo catheter. ID, urology following. Jaundice (High direct bilirubin)- mets to liver. - MRCP: Cholelithiasis; No choledocholithiasis; No biliary ductal dilation. High grade urothelial carcinoma.  Hepatic/Lung metastases. DM: sliding scale insulin.

## 2019-12-12 NOTE — CONSULT NOTE ADULT - SUBJECTIVE AND OBJECTIVE BOX
HPI:  75 yo male with PMH of BPH, HTN, DM, CAD, high grade urothelial carcinoma/metastatic comes to the ED for Abdominal pain and fever for 3 days duration.  pain in lower abdomen, sharp, 4/10, aggravates on straining or pressing lower abdomen.  Fever subjective, low grade, associated with nausea, 1 episode non billious non bloody vomiting, severe loss of appetite, and severe generalized weakness.  patient also complains of dry cough worse on deep inspiration.  No headache, photophobia, chest pain, vertigo, LE edema.    patient has been recently diagnosed with High grade urothelial carcinoma taking chemo pills 2 pills per day for last 2 months.  follows up with Dr Hook at [facility].; recently had PET/CT done; has an appointment there tomorrow.    In the ED, patient Tachycardic, severe generalized weakness, prolonger QT, severe hypokalemia. (09 Dec 2019 03:00)      PAST MEDICAL & SURGICAL HISTORY:  Bladder cancer  CAD, multiple vessel  Anemia  PVD (peripheral vascular disease)  Diabetes mellitus: type 2  Hypertension  Prostate CA: with radiation  S/P coronary artery stent placement  H/O hernia repair      Hospital Course:  He is deconditioned and weak. He has urothelial carcinoma metastatic high grade with mets to liver and lungs. Hosp with weakness. treated for urosepsis.   TODAY'S SUBJECTIVE & REVIEW OF SYMPTOMS:     Constitutional WNL   Cardio WNL   Resp WNL   GI WNL  Heme WNL  Endo WNL  Skin WNL  MSK WNL  Neuro WNL  Cognitive WNL  Psych WNL      MEDICATIONS  (STANDING):  amoxicillin  875 milliGRAM(s)/clavulanate 1 Tablet(s) Oral two times a day  atorvastatin Oral Tab/Cap - Peds 10 milliGRAM(s) Oral daily  chlorhexidine 4% Liquid 1 Application(s) Topical <User Schedule>  dronabinol 2.5 milliGRAM(s) Oral daily  finasteride 5 milliGRAM(s) Oral daily  furosemide    Tablet 40 milliGRAM(s) Oral daily  insulin glargine Injectable (LANTUS) 10 Unit(s) SubCutaneous at bedtime  insulin lispro (HumaLOG) corrective regimen sliding scale   SubCutaneous three times a day before meals  lisinopril 20 milliGRAM(s) Oral daily  multivitamin 1 Tablet(s) Oral daily  oxybutynin 5 milliGRAM(s) Oral daily  phenazopyridine 200 milliGRAM(s) Oral three times a day  predniSONE   Tablet 5 milliGRAM(s) Oral daily  ranolazine 500 milliGRAM(s) Oral two times a day  tamsulosin Oral Tab/Cap - Peds 0.4 milliGRAM(s) Oral at bedtime    MEDICATIONS  (PRN):  albuterol/ipratropium for Nebulization 3 milliLiter(s) Nebulizer every 6 hours PRN Shortness of Breath and/or Wheezing      FAMILY HISTORY:      Allergies    No Known Allergies    Intolerances        SOCIAL HISTORY:    [  ] Etoh  [  ] Smoking  [  ] Substance abuse     Home Environment:  [  ] Home Alone  [  ] Lives with Family  [ x ] Home Health Aid-he has 38 hours of HHA help per week    Dwelling:  [  ] Apartment  [  ] Private House  [  ] Adult Home  [  ] Skilled Nursing Facility      [  ] Short Term  [  ] Long Term  [  ] Stairs       Elevator [  ]    FUNCTIONAL STATUS PTA: (Check all that apply)  Ambulation: [   ]Independent    [  ] Dependent     [  ] Non-Ambulatory  Assistive Device: [  ] SA Cane  [  ]  Q Cane  [  ] Walker  [  ]  Wheelchair  ADL : [  ] Independent  [  ]  Dependent       Vital Signs Last 24 Hrs  T(C): 36.1 (12 Dec 2019 12:17), Max: 37.2 (11 Dec 2019 21:00)  T(F): 97 (12 Dec 2019 12:17), Max: 98.9 (11 Dec 2019 21:00)  HR: 84 (12 Dec 2019 12:17) (84 - 101)  BP: 154/80 (12 Dec 2019 12:17) (150/83 - 162/86)  BP(mean): --  RR: 17 (12 Dec 2019 12:17) (17 - 18)  SpO2: --      PHYSICAL EXAM: Alert & Oriented X3  GENERAL: NAD, well-groomed, well-developed  HEAD:  Atraumatic, Normocephalic  EYES: EOMI, PERRLA, conjunctiva and sclera clear  NECK: Supple, No JVD, Normal thyroid  CHEST/LUNG: Clear to percussion bilaterally; No rales, rhonchi, wheezing, or rubs  HEART: Regular rate and rhythm; No murmurs, rubs, or gallops  ABDOMEN: Soft, Nontender, Nondistended; Bowel sounds present  EXTREMITIES:  2+ Peripheral Pulses, No clubbing, cyanosis, or edema    NERVOUS SYSTEM:  Cranial Nerves 2-12 intact [  ] Abnormal  [  ]  ROM: WFL all extremities [  ]  Abnormal [  ]  Motor Strength: WFL all extremities  [  ]  Abnormal [x  ]   4-/5 LE's p[devora  Sensation: intact to light touch [  ] Abnormal [  ]  Reflexes: Symmetric [  ]  Abnormal [  ]    FUNCTIONAL STATUS:  Bed Mobility: Independent [  ]  Supervision [  ]  Needs Assistance [  ]  N/A [  ]  Transfers: Independent [  ]  Supervision [  ]  Needs Assistance [  ]  N/A [  ]   Ambulation: Independent [  ]  Supervision [  ]  Needs Assistance [  ]  N/A [  ]  ADL: Independent [  ] Requires Assistance [  ] N/A [  ]      LABS:                        8.0    14.64 )-----------( 184      ( 12 Dec 2019 06:18 )             25.0     12-12    138  |  99  |  11  ----------------------------<  162<H>  3.0<L>   |  22  |  0.7    Ca    7.7<L>      12 Dec 2019 06:18  Mg     1.6     12-12    TPro  5.6<L>  /  Alb  2.3<L>  /  TBili  4.4<H>  /  DBili  x   /  AST  119<H>  /  ALT  37  /  AlkPhos  185<H>  12-12          RADIOLOGY & ADDITIONAL STUDIES:    Assesment:

## 2019-12-12 NOTE — DIETITIAN INITIAL EVALUATION ADULT. - DIET TYPE
Pt. reports poor appetite for the past couple of weeks, usually has 2 small meals daily. Takes vit K and megadose vit C supplement. Encouraged pt. to discuss marie dose supplement indication with MD and discouraged pt. from taking it until he does so.  Pt. states his daughter ordered some "shakes" for him but unsure what kind. UBW 204lbs per pt. No cultural/Orthodox dietary restr.

## 2019-12-12 NOTE — CONSULT NOTE ADULT - ASSESSMENT
IMPRESSION: Rehab of   debility, UTI, metastatic urothelial carcinoma    PRECAUTIONS: [x  ] Cardiac  [  ] Respiratory  [  ] Seizures [  ] Contact Isolation  [  ] Droplet Isolation  [  ] Other    Weight Bearing Status:     RECOMMENDATION:    Out of Bed to Chair     DVT/Decubiti Prophylaxis    REHAB PLAN:     [ x  ] Bedside P/T 3-5 times a week   [   ]   Bedside O/T  2-3 times a week             [   ] No Rehab Therapy Indicated                   [   ]  Speech Therapy   Conditioning/ROM                                    ADL  Bed Mobility                                               Conditioning/ROM  Transfers                                                     Bed Mobility  Sitting /Standing Balance                         Transfers                                        Gait Training                                               Sitting/Standing Balance  Stair Training [   ]Applicable                    Home equipment Eval                                                                        Splinting  [   ] Only      GOALS:   ADL   [  x ]   Independent                    Transfers  [ x  ] Independent                          Ambulation  [ x  ] Independent     [    ] With device                            [   ]  CG                                                         [   ]  CG                                                                  [   ] CG                            [    ] Min A                                                   [   ] Min A                                                              [   ] Min  A          DISCHARGE PLAN:   [   ]  Good candidate for Intensive Rehabilitation/Hospital based-[facility]                                             Will tolerate 3hrs Intensive Rehab Daily                                       [  x  ]  Short Term Rehab in Skilled Nursing Facility                                       [    ]  Home with Outpatient or  services                                         [    ]  Possible Candidate for Intensive Hospital based Rehab

## 2019-12-12 NOTE — PROGRESS NOTE ADULT - ATTENDING COMMENTS
I saw and evaluated patient  by bedside, c/o slightly improved appetite, no dyspnea today,  patient remains very deconditioned, c/o occasional right upper and right lower abdominal pain, no hypoxia at rest today, c/o leaking palomo catheter.  All labs, radiology studies, VS was reviewed  I have reviewed the resident's note and agree with documented findings and  plan of care.  - Progressive stage 4 urothelial carcinoma with metastatic disease to lungs, liver with very poor prognosis,  continue palliative hormonal tx.    - Acute transaminitis due to metastatic liver disease- MRCP- no biliary system obstruction, continue supportive tx.   -Acute Hypoxic respiratory failure - clinically has improved post lasix tx, and starting nebs, Oxygen tx. transition to po lasix tx.  - Complicated UTI - IV abx tx. -  switched to PO abx.   -Malnutrition - continue supportive tx.     Patient's overall prognosis very poor    #Progress Note Handoff: continue abx tx. , f/up  for leaking palomo catheter  Family discussion: medical problems and plan was d/w patient  by bedside Disposition: SNF in 24 to 48 hours I saw and evaluated patient  by bedside, c/o slightly improved appetite, no dyspnea today,  patient remains very deconditioned, c/o occasional right upper and right lower abdominal pain, no hypoxia at rest today, c/o leaking palomo catheter.  All labs, radiology studies, VS was reviewed  I have reviewed the resident's note and agree with documented findings and  plan of care.  - Progressive stage 4 urothelial carcinoma with metastatic disease to lungs, liver with very poor prognosis,  continue palliative hormonal tx.    - Acute transaminitis due to metastatic liver disease- MRCP- no biliary system obstruction, continue supportive tx.   -Acute Hypoxic respiratory failure - clinically has improved post lasix tx, and starting nebs, Oxygen tx. transition to po lasix tx.  - Complicated UTI - IV abx tx. -  switched to PO abx.   -Hypomangesemia, Hypokalemia - supplemented  -Malnutrition - continue supportive tx.     Patient's overall prognosis very poor    #Progress Note Handoff: continue abx tx. , f/up  for leaking palomo catheter  Family discussion: medical problems and plan was d/w patient  by bedside Disposition: SNF in 24 to 48 hours

## 2019-12-12 NOTE — DIETITIAN INITIAL EVALUATION ADULT. - PHYSICAL APPEARANCE
overweight/BMI 27.7, alert&oriented, Polish speaking- interviewed pt. in Polish, skin: intact (BS 17), jaundiced, no obvious physical signs of malnutrition noted

## 2019-12-12 NOTE — PROGRESS NOTE ADULT - ASSESSMENT
Mr. Milton is a 73 yo male patient with PMH of BPH, HTN, DM, CAD, high grade urothelial carcinoma/metastatic to liver and lungs (following at [facility]") comes to the ED for Abdominal pain and fever for 3 days duration.    #Acute complicated UTI in the setting of high grade urothelial carcinoma and chronic palomo catheter  - Positive UA  - BCx 12/08 NGTD  - UCx 11/08 <49k E fecalis  - Per ID, continue PO Augmentin 875mg BID for 7 days    #Jaundice (High direct bilirubin)  - Patient with RUQ tenderness  - Known mets to the liver, unclear if getting worse. PET scan done last week (no results as per daughter, she will try to bring the results)  - CT scan + RUQ US: cholelithiasis, no cholecystitis, no CBD dilatation  - HIDA scan: no cholecystitis  - MRCP: Cholelithiasis; No choledocholithiasis; No biliary ductal dilation. Hepatic/Lung metastases.    #Malnutrition, hypoalbuminemia severe hypokalemia  - Monitor, replete as needed  - Follow up nutrition consult    #High grade urothelial carcinoma with metastasis to liver and lung  - Follows with Dr. Hook ([phone]) at [facility]  - Patient had recent PET/CT, follow up results  - Continue Zytiga  - Continue Prednisone 5mg Daily    #Prolonged QTc  - QTc >700 on admission, likely due to hypokalemia and hypomagnesemia  - Down to ~500, telemetry discontinued     #DM  - Continue Lantus 10 qHS, Sliding scale    #CAD  - Continue home meds    #HTN  - Continue Lisinopril 20mg daily    #Diet: Clear Liquids  #GI Prophylaxis: Protonix 40mg PO Daily  #DVT Prophylaxis: Holding Lovenox for hematuria  #Activity: Ambulate as tolerated  #Code Status: DNR/DNI Mr. Milton is a 73 yo male patient with PMH of BPH, HTN, DM, CAD, high grade urothelial carcinoma/metastatic to liver and lungs (following at [facility]) comes to the ED for Abdominal pain and fever for 3 days duration.    #Acute complicated UTI in the setting of high grade urothelial carcinoma and chronic palomo catheter  - Positive UA  - BCx 12/08 NGTD  - UCx 11/08 <49k E fecalis  - Per ID, continue PO Augmentin 875mg BID for 7 days  - Follow up Urology consult    #Jaundice (High direct bilirubin)  - Patient with RUQ tenderness  - Known mets to the liver, unclear if getting worse. PET scan done last week (no results as per daughter, she will try to bring the results)  - CT scan + RUQ US: cholelithiasis, no cholecystitis, no CBD dilatation  - HIDA scan: no cholecystitis  - MRCP: Cholelithiasis; No choledocholithiasis; No biliary ductal dilation. Hepatic/Lung metastases.    #Malnutrition, hypoalbuminemia severe hypokalemia  - Monitor, replete as needed  - Follow up nutrition consult    #High grade urothelial carcinoma with metastasis to liver and lung  - Follows with Dr. Hook ([phone]) at [facility]  - Patient had recent PET/CT, follow up results  - Continue Zytiga  - Continue Prednisone 5mg Daily    #Prolonged QTc  - QTc >700 on admission, likely due to hypokalemia and hypomagnesemia  - Down to ~500, telemetry discontinued     #DM  - Continue Lantus 10 qHS, Sliding scale    #CAD  - Continue home meds    #HTN  - Continue Lisinopril 20mg daily    #Diet: Clear Liquids  #GI Prophylaxis: Protonix 40mg PO Daily  #DVT Prophylaxis: Holding Lovenox for hematuria  #Activity: Ambulate as tolerated  #Code Status: DNR/DNI

## 2019-12-12 NOTE — PROGRESS NOTE ADULT - SUBJECTIVE AND OBJECTIVE BOX
Patient Information:  EMIGDIO RUBI / 74y / Male / MRN#:[id number]    Hospital Day: 3d    HPI:  73 yo male with PMH of BPH, HTN, DM, CAD, high grade urothelial carcinoma/metastatic comes to the ED for Abdominal pain and fever for 3 days duration.  pain in lower abdomen, sharp, 4/10, aggravates on straining or pressing lower abdomen.  Fever subjective, low grade, associated with nausea, 1 episode non billious non bloody vomiting, severe loss of appetite, and severe generalized weakness.  patient also complains of dry cough worse on deep inspiration.  No headache, photophobia, chest pain, vertigo, LE edema.    patient has been recently diagnosed with High grade urothelial carcinoma taking chemo pills 2 pills per day for last 2 months.  follows up with Dr Hook at [facility].; recently had PET/CT done; has an appointment there tomorrow.    In the ED, patient Tachycardic, severe generalized weakness, prolonger QT, severe hypokalemia.    Interval History:  Patient seen and examined at bedside. Patient's abdominal pain improved compared to yesterday. Primary concern is palomo leakage. Urology consult placed for evaluation.    Past Medical History:  Bladder cancer  CAD, multiple vessel  Anemia  PVD (peripheral vascular disease)  Diabetes mellitus  Hypertension  Prostate CA    Past Surgical History:  S/P coronary artery stent placement  H/O hernia repair  No significant past surgical history    Allergies:  No Known Allergies    Medications:  PRN:    Standing:  albuterol/ipratropium for Nebulization 3 milliLiter(s) Nebulizer every 6 hours  amoxicillin  875 milliGRAM(s)/clavulanate 1 Tablet(s) Oral two times a day  atorvastatin Oral Tab/Cap - Peds 10 milliGRAM(s) Oral daily  chlorhexidine 4% Liquid 1 Application(s) Topical <User Schedule>  dronabinol 2.5 milliGRAM(s) Oral daily  finasteride 5 milliGRAM(s) Oral daily  insulin glargine Injectable (LANTUS) 10 Unit(s) SubCutaneous at bedtime  insulin lispro (HumaLOG) corrective regimen sliding scale   SubCutaneous three times a day before meals  lisinopril 20 milliGRAM(s) Oral daily  magnesium sulfate  IVPB 2 Gram(s) IV Intermittent once  multivitamin 1 Tablet(s) Oral daily  oxybutynin 5 milliGRAM(s) Oral daily  phenazopyridine 200 milliGRAM(s) Oral three times a day  potassium chloride  20 mEq/100 mL IVPB 20 milliEquivalent(s) IV Intermittent every 2 hours  predniSONE   Tablet 5 milliGRAM(s) Oral daily  ranolazine 500 milliGRAM(s) Oral two times a day  tamsulosin Oral Tab/Cap - Peds 0.4 milliGRAM(s) Oral at bedtime    Home:  acetaminophen 325 mg oral tablet: 2 tab(s) orally once, As needed, Mild Pain (1 - 3)  atorvastatin 10 mg oral tablet: 1 tab(s) orally once a day  finasteride 5 mg oral tablet: 1 tab(s) orally once a day  glipiZIDE 5 mg oral tablet: 1 tab(s) orally once a day  Janumet 50 mg-500 mg oral tablet:   lisinopril-hydroCHLOROthiazide 20 mg-25 mg oral tablet: 1 tab(s) orally once a day  Ranexa 500 mg oral tablet, extended release: 1 tab(s) orally 2 times a day    Vitals:  T(C): 35.9, Max: 37.2 (12-11-19 @ 21:00)  T(F): 96.6, Max: 98.9 (12-11-19 @ 21:00)  HR: 90 (90 - 105)  BP: 150/83 (150/83 - 171/98)  RR: 18 (18 - 18)  SpO2: --    Physical Exam:  General: Awake, Alert. Jaundiced  Heart: Regular rate and rhythm; S1, S2; No murmurs.  Lungs: Clear to auscultation bilaterally.  Abdomen: Soft, tender, nondistended.   Neuro: AAOx3, No focal deficits.    Labs:  CBC (12-12 @ 06:18)                        Hgb: 8.0    WBC: 14.64 )-----------------( Plts: 184                              Hct: 25.0     Chem (12-12 @ 06:18)  Na: 138  |     Cl: 99     |  BUN: 11  -----------------------------------------< Gluc: 162    K: 3.0   |    HCO3: 22  |  Cr: 0.7    Ca 7.7 (12-12 @ 06:18)  Mg 1.6 (12-12 @ 06:18)    LFTs (12-12 @ 06:18)  TPro 5.6  /  Alb 2.3  TBili 4.4  /  DBili       /  ALT 37  /  AlkPhos 185    Microbiology:  Culture - Urine (collected 12-08 @ 22:10)  Source: .Urine Clean Catch (Midstream)  Final Report (12-10 @ 12:25):    >=3 organisms. Probable collection contamination.    Culture - Blood (collected 12-08 @ 20:45)  Source: .Blood Blood-Peripheral  Preliminary Report (12-10 @ 08:00):    No growth to date.    Culture - Blood (collected 12-08 @ 19:50)  Source: .Blood Blood-Peripheral  Preliminary Report (12-10 @ 08:00):    No growth to date.

## 2019-12-12 NOTE — DIETITIAN INITIAL EVALUATION ADULT. - ADD RECOMMEND
add Ensure clear BID, Prosource gelatein SF TID. Continue Marinol daily. add Ensure clear BID, Prosource gelatein SF TID. Continue Marinol daily. Replete K.

## 2019-12-13 ENCOUNTER — TRANSCRIPTION ENCOUNTER (OUTPATIENT)
Age: 74
End: 2019-12-13

## 2019-12-13 LAB
ALBUMIN SERPL ELPH-MCNC: 2.3 G/DL — LOW (ref 3.5–5.2)
ALP SERPL-CCNC: 242 U/L — HIGH (ref 30–115)
ALT FLD-CCNC: 64 U/L — HIGH (ref 0–41)
ANION GAP SERPL CALC-SCNC: 20 MMOL/L — HIGH (ref 7–14)
AST SERPL-CCNC: 223 U/L — HIGH (ref 0–41)
BASOPHILS # BLD AUTO: 0.04 K/UL — SIGNIFICANT CHANGE UP (ref 0–0.2)
BASOPHILS NFR BLD AUTO: 0.3 % — SIGNIFICANT CHANGE UP (ref 0–1)
BILIRUB SERPL-MCNC: 5.6 MG/DL — HIGH (ref 0.2–1.2)
BUN SERPL-MCNC: 13 MG/DL — SIGNIFICANT CHANGE UP (ref 10–20)
CALCIUM SERPL-MCNC: 7.9 MG/DL — LOW (ref 8.5–10.1)
CHLORIDE SERPL-SCNC: 100 MMOL/L — SIGNIFICANT CHANGE UP (ref 98–110)
CO2 SERPL-SCNC: 21 MMOL/L — SIGNIFICANT CHANGE UP (ref 17–32)
CREAT SERPL-MCNC: 0.7 MG/DL — SIGNIFICANT CHANGE UP (ref 0.7–1.5)
EOSINOPHIL # BLD AUTO: 0.04 K/UL — SIGNIFICANT CHANGE UP (ref 0–0.7)
EOSINOPHIL NFR BLD AUTO: 0.3 % — SIGNIFICANT CHANGE UP (ref 0–8)
GLUCOSE BLDC GLUCOMTR-MCNC: 168 MG/DL — HIGH (ref 70–99)
GLUCOSE BLDC GLUCOMTR-MCNC: 190 MG/DL — HIGH (ref 70–99)
GLUCOSE BLDC GLUCOMTR-MCNC: 193 MG/DL — HIGH (ref 70–99)
GLUCOSE BLDC GLUCOMTR-MCNC: 200 MG/DL — HIGH (ref 70–99)
GLUCOSE SERPL-MCNC: 158 MG/DL — HIGH (ref 70–99)
HCT VFR BLD CALC: 26.1 % — LOW (ref 42–52)
HGB BLD-MCNC: 8.4 G/DL — LOW (ref 14–18)
IMM GRANULOCYTES NFR BLD AUTO: 1.9 % — HIGH (ref 0.1–0.3)
LYMPHOCYTES # BLD AUTO: 16.7 % — LOW (ref 20.5–51.1)
LYMPHOCYTES # BLD AUTO: 2.52 K/UL — SIGNIFICANT CHANGE UP (ref 1.2–3.4)
MAGNESIUM SERPL-MCNC: 1.8 MG/DL — SIGNIFICANT CHANGE UP (ref 1.8–2.4)
MCHC RBC-ENTMCNC: 27.6 PG — SIGNIFICANT CHANGE UP (ref 27–31)
MCHC RBC-ENTMCNC: 32.2 G/DL — SIGNIFICANT CHANGE UP (ref 32–37)
MCV RBC AUTO: 85.9 FL — SIGNIFICANT CHANGE UP (ref 80–94)
MONOCYTES # BLD AUTO: 1.32 K/UL — HIGH (ref 0.1–0.6)
MONOCYTES NFR BLD AUTO: 8.8 % — SIGNIFICANT CHANGE UP (ref 1.7–9.3)
NEUTROPHILS # BLD AUTO: 10.84 K/UL — HIGH (ref 1.4–6.5)
NEUTROPHILS NFR BLD AUTO: 72 % — SIGNIFICANT CHANGE UP (ref 42.2–75.2)
NRBC # BLD: 0 /100 WBCS — SIGNIFICANT CHANGE UP (ref 0–0)
PLATELET # BLD AUTO: 191 K/UL — SIGNIFICANT CHANGE UP (ref 130–400)
POTASSIUM SERPL-MCNC: 3.4 MMOL/L — LOW (ref 3.5–5)
POTASSIUM SERPL-SCNC: 3.4 MMOL/L — LOW (ref 3.5–5)
PROT SERPL-MCNC: 5.6 G/DL — LOW (ref 6–8)
RBC # BLD: 3.04 M/UL — LOW (ref 4.7–6.1)
RBC # FLD: 17.3 % — HIGH (ref 11.5–14.5)
SODIUM SERPL-SCNC: 141 MMOL/L — SIGNIFICANT CHANGE UP (ref 135–146)
WBC # BLD: 15.05 K/UL — HIGH (ref 4.8–10.8)
WBC # FLD AUTO: 15.05 K/UL — HIGH (ref 4.8–10.8)

## 2019-12-13 PROCEDURE — 99233 SBSQ HOSP IP/OBS HIGH 50: CPT

## 2019-12-13 RX ORDER — ASPIRIN/CALCIUM CARB/MAGNESIUM 324 MG
81 TABLET ORAL DAILY
Refills: 0 | Status: DISCONTINUED | OUTPATIENT
Start: 2019-12-13 | End: 2019-12-16

## 2019-12-13 RX ORDER — OXYCODONE HYDROCHLORIDE 5 MG/1
5 TABLET ORAL ONCE
Refills: 0 | Status: DISCONTINUED | OUTPATIENT
Start: 2019-12-13 | End: 2019-12-13

## 2019-12-13 RX ORDER — SENNA PLUS 8.6 MG/1
2 TABLET ORAL AT BEDTIME
Refills: 0 | Status: DISCONTINUED | OUTPATIENT
Start: 2019-12-13 | End: 2019-12-16

## 2019-12-13 RX ORDER — POTASSIUM CHLORIDE 20 MEQ
20 PACKET (EA) ORAL
Refills: 0 | Status: COMPLETED | OUTPATIENT
Start: 2019-12-13 | End: 2019-12-13

## 2019-12-13 RX ORDER — MORPHINE SULFATE 50 MG/1
4 CAPSULE, EXTENDED RELEASE ORAL ONCE
Refills: 0 | Status: DISCONTINUED | OUTPATIENT
Start: 2019-12-13 | End: 2019-12-13

## 2019-12-13 RX ORDER — POTASSIUM CHLORIDE 20 MEQ
20 PACKET (EA) ORAL
Refills: 0 | Status: DISCONTINUED | OUTPATIENT
Start: 2019-12-13 | End: 2019-12-13

## 2019-12-13 RX ORDER — POLYETHYLENE GLYCOL 3350 17 G/17G
17 POWDER, FOR SOLUTION ORAL DAILY
Refills: 0 | Status: DISCONTINUED | OUTPATIENT
Start: 2019-12-13 | End: 2019-12-16

## 2019-12-13 RX ORDER — DRONABINOL 2.5 MG
2.5 CAPSULE ORAL
Refills: 0 | Status: DISCONTINUED | OUTPATIENT
Start: 2019-12-13 | End: 2019-12-16

## 2019-12-13 RX ORDER — LACTULOSE 10 G/15ML
10 SOLUTION ORAL
Refills: 0 | Status: DISCONTINUED | OUTPATIENT
Start: 2019-12-13 | End: 2019-12-14

## 2019-12-13 RX ORDER — MORPHINE SULFATE 50 MG/1
4 CAPSULE, EXTENDED RELEASE ORAL EVERY 6 HOURS
Refills: 0 | Status: DISCONTINUED | OUTPATIENT
Start: 2019-12-13 | End: 2019-12-16

## 2019-12-13 RX ORDER — MAGNESIUM SULFATE 500 MG/ML
2 VIAL (ML) INJECTION ONCE
Refills: 0 | Status: COMPLETED | OUTPATIENT
Start: 2019-12-13 | End: 2019-12-13

## 2019-12-13 RX ADMIN — LACTULOSE 10 GRAM(S): 10 SOLUTION ORAL at 11:08

## 2019-12-13 RX ADMIN — RANOLAZINE 500 MILLIGRAM(S): 500 TABLET, FILM COATED, EXTENDED RELEASE ORAL at 17:29

## 2019-12-13 RX ADMIN — LISINOPRIL 20 MILLIGRAM(S): 2.5 TABLET ORAL at 05:34

## 2019-12-13 RX ADMIN — Medication 50 MILLIEQUIVALENT(S): at 15:23

## 2019-12-13 RX ADMIN — MORPHINE SULFATE 4 MILLIGRAM(S): 50 CAPSULE, EXTENDED RELEASE ORAL at 18:43

## 2019-12-13 RX ADMIN — FINASTERIDE 5 MILLIGRAM(S): 5 TABLET, FILM COATED ORAL at 11:09

## 2019-12-13 RX ADMIN — Medication 40 MILLIGRAM(S): at 05:36

## 2019-12-13 RX ADMIN — Medication 200 MILLIGRAM(S): at 05:34

## 2019-12-13 RX ADMIN — CHLORHEXIDINE GLUCONATE 1 APPLICATION(S): 213 SOLUTION TOPICAL at 05:34

## 2019-12-13 RX ADMIN — RANOLAZINE 500 MILLIGRAM(S): 500 TABLET, FILM COATED, EXTENDED RELEASE ORAL at 05:34

## 2019-12-13 RX ADMIN — Medication 5 MILLIGRAM(S): at 05:34

## 2019-12-13 RX ADMIN — Medication 2.5 MILLIGRAM(S): at 17:28

## 2019-12-13 RX ADMIN — Medication 1 TABLET(S): at 11:09

## 2019-12-13 RX ADMIN — TAMSULOSIN HYDROCHLORIDE 0.4 MILLIGRAM(S): 0.4 CAPSULE ORAL at 21:47

## 2019-12-13 RX ADMIN — Medication 1: at 17:29

## 2019-12-13 RX ADMIN — ATORVASTATIN CALCIUM 10 MILLIGRAM(S): 80 TABLET, FILM COATED ORAL at 11:08

## 2019-12-13 RX ADMIN — Medication 1: at 12:15

## 2019-12-13 RX ADMIN — MORPHINE SULFATE 4 MILLIGRAM(S): 50 CAPSULE, EXTENDED RELEASE ORAL at 08:30

## 2019-12-13 RX ADMIN — MORPHINE SULFATE 4 MILLIGRAM(S): 50 CAPSULE, EXTENDED RELEASE ORAL at 18:28

## 2019-12-13 RX ADMIN — Medication 50 GRAM(S): at 11:07

## 2019-12-13 RX ADMIN — Medication 1: at 08:15

## 2019-12-13 RX ADMIN — Medication 1 TABLET(S): at 17:28

## 2019-12-13 RX ADMIN — MORPHINE SULFATE 4 MILLIGRAM(S): 50 CAPSULE, EXTENDED RELEASE ORAL at 08:15

## 2019-12-13 RX ADMIN — Medication 81 MILLIGRAM(S): at 11:08

## 2019-12-13 RX ADMIN — POLYETHYLENE GLYCOL 3350 17 GRAM(S): 17 POWDER, FOR SOLUTION ORAL at 11:09

## 2019-12-13 RX ADMIN — Medication 200 MILLIGRAM(S): at 14:25

## 2019-12-13 RX ADMIN — Medication 2.5 MILLIGRAM(S): at 11:08

## 2019-12-13 RX ADMIN — LACTULOSE 10 GRAM(S): 10 SOLUTION ORAL at 17:29

## 2019-12-13 RX ADMIN — Medication 50 MILLIEQUIVALENT(S): at 12:14

## 2019-12-13 RX ADMIN — Medication 1 TABLET(S): at 05:33

## 2019-12-13 RX ADMIN — SENNA PLUS 2 TABLET(S): 8.6 TABLET ORAL at 21:47

## 2019-12-13 RX ADMIN — INSULIN GLARGINE 10 UNIT(S): 100 INJECTION, SOLUTION SUBCUTANEOUS at 21:47

## 2019-12-13 RX ADMIN — Medication 5 MILLIGRAM(S): at 11:09

## 2019-12-13 RX ADMIN — ABIRATERONE ACETATE 1000 MILLIGRAM(S): 250 TABLET ORAL at 05:46

## 2019-12-13 NOTE — PROGRESS NOTE ADULT - SUBJECTIVE AND OBJECTIVE BOX
Patient Information:  EMIGDIO RUBI / 74y / Male / MRN#:[id number]    Hospital Day: 4d    HPI:  75 yo male with PMH of BPH, HTN, DM, CAD, high grade urothelial carcinoma/metastatic comes to the ED for Abdominal pain and fever for 3 days duration.  pain in lower abdomen, sharp, 4/10, aggravates on straining or pressing lower abdomen.  Fever subjective, low grade, associated with nausea, 1 episode non billious non bloody vomiting, severe loss of appetite, and severe generalized weakness.  patient also complains of dry cough worse on deep inspiration.  No headache, photophobia, chest pain, vertigo, LE edema.    patient has been recently diagnosed with High grade urothelial carcinoma taking chemo pills 2 pills per day for last 2 months.  follows up with Dr Hook at [facility].; recently had PET/CT done; has an appointment there tomorrow.    In the ED, patient Tachycardic, severe generalized weakness, prolonger QT, severe hypokalemia.    Interval History:  Patient seen and examined at bedside. No acute events overnight. Breathing improving, pain controlled with medication. Primary concern continues to be leaking palomo. Urology evaluated yesterday. Leaking likely due to bladder spasms, not size of catheter or balloon inflation. Primary solution would be antispasmodics, but not recommended due to side effects. Will be chronic issue.    Past Medical History:  Bladder cancer  CAD, multiple vessel  Anemia  PVD (peripheral vascular disease)  Diabetes mellitus  Hypertension  Prostate CA    Past Surgical History:  S/P coronary artery stent placement  H/O hernia repair  No significant past surgical history    Allergies:  No Known Allergies    Medications:  PRN:  albuterol/ipratropium for Nebulization 3 milliLiter(s) Nebulizer every 6 hours PRN Shortness of Breath and/or Wheezing  morphine  - Injectable 4 milliGRAM(s) IV Push every 6 hours PRN Moderate Pain (4 - 6)    Standing:  abiraterone 1000 milliGRAM(s) Oral daily  amoxicillin  875 milliGRAM(s)/clavulanate 1 Tablet(s) Oral two times a day  atorvastatin Oral Tab/Cap - Peds 10 milliGRAM(s) Oral daily  chlorhexidine 4% Liquid 1 Application(s) Topical <User Schedule>  dronabinol 2.5 milliGRAM(s) Oral daily  finasteride 5 milliGRAM(s) Oral daily  furosemide    Tablet 40 milliGRAM(s) Oral daily  insulin glargine Injectable (LANTUS) 10 Unit(s) SubCutaneous at bedtime  insulin lispro (HumaLOG) corrective regimen sliding scale   SubCutaneous three times a day before meals  lisinopril 20 milliGRAM(s) Oral daily  multivitamin 1 Tablet(s) Oral daily  oxybutynin 5 milliGRAM(s) Oral daily  phenazopyridine 200 milliGRAM(s) Oral three times a day  predniSONE   Tablet 5 milliGRAM(s) Oral daily  ranolazine 500 milliGRAM(s) Oral two times a day  tamsulosin Oral Tab/Cap - Peds 0.4 milliGRAM(s) Oral at bedtime    Home:  acetaminophen 325 mg oral tablet: 2 tab(s) orally once, As needed, Mild Pain (1 - 3)  atorvastatin 10 mg oral tablet: 1 tab(s) orally once a day  finasteride 5 mg oral tablet: 1 tab(s) orally once a day  glipiZIDE 5 mg oral tablet: 1 tab(s) orally once a day  Janumet 50 mg-500 mg oral tablet:   lisinopril-hydroCHLOROthiazide 20 mg-25 mg oral tablet: 1 tab(s) orally once a day  Ranexa 500 mg oral tablet, extended release: 1 tab(s) orally 2 times a day    Vitals:  T(C): 35.9, Max: 37.1 (12-12-19 @ 21:00)  T(F): 96.7, Max: 98.7 (12-12-19 @ 21:00)  HR: 101 (84 - 105)  BP: 161/79 (143/76 - 161/79)  RR: 18 (17 - 18)  SpO2: 99% (95% - 99%)    Physical Exam:  General: Awake, Alert. Jaundiced  Heart: Regular rate and rhythm; S1, S2; No murmurs.  Lungs: Coarse breath sounds bilaterally.  Abdomen: Soft, RUQ/RLQ tender to palpation, nondistended.  : Palomo in place, leaking  Extremities: No edema in upper or lower extremities.  Neuro: AAOx3, No focal deficits.    Labs:  CBC (12-12 @ 06:18)                        Hgb: 8.0    WBC: 14.64 )-----------------( Plts: 184                              Hct: 25.0     Chem (12-12 @ 06:18)  Na: 138  |     Cl: 99     |  BUN: 11  -----------------------------------------< Gluc: 162    K: 3.0   |    HCO3: 22  |  Cr: 0.7    Ca 7.7 (12-12 @ 06:18)  Mg 1.6 (12-12 @ 06:18)    LFTs (12-12 @ 06:18)  TPro 5.6  /  Alb 2.3  TBili 4.4  /  DBili       /  ALT 37  /  AlkPhos 185    Microbiology:  Culture - Urine (collected 12-08 @ 22:10)  Source: .Urine Clean Catch (Midstream)  Final Report (12-10 @ 12:25):    >=3 organisms. Probable collection contamination.    Culture - Blood (collected 12-08 @ 20:45)  Source: .Blood Blood-Peripheral  Preliminary Report (12-10 @ 08:00):    No growth to date.    Culture - Blood (collected 12-08 @ 19:50)  Source: .Blood Blood-Peripheral  Preliminary Report (12-10 @ 08:00):    No growth to date.

## 2019-12-13 NOTE — PHYSICAL THERAPY INITIAL EVALUATION ADULT - CRITERIA FOR SKILLED THERAPEUTIC INTERVENTIONS
predicted duration of therapy intervention/risk reduction/prevention/rehab potential/impairments found/therapy frequency/functional limitations in following categories

## 2019-12-13 NOTE — DISCHARGE NOTE PROVIDER - NSDCMRMEDTOKEN_GEN_ALL_CORE_FT
acetaminophen 325 mg oral tablet: 2 tab(s) orally once, As needed, Mild Pain (1 - 3)  atorvastatin 10 mg oral tablet: 1 tab(s) orally once a day  Bactrim  mg-160 mg oral tablet: 1 tab(s) orally 2 times a day   Cipro 500 mg oral tablet: 1 tab(s) orally 2 times a day   finasteride 5 mg oral tablet: 1 tab(s) orally once a day  glipiZIDE 5 mg oral tablet: 1 tab(s) orally once a day  Janumet 50 mg-500 mg oral tablet:   lisinopril-hydroCHLOROthiazide 20 mg-25 mg oral tablet: 1 tab(s) orally once a day  Macrobid 100 mg oral capsule: 1 cap(s) orally 2 times a day   oxybutynin 5 mg oral tablet: 1 tab(s) orally 2 times a day   Ranexa 500 mg oral tablet, extended release: 1 tab(s) orally 2 times a day  tamsulosin 0.4 mg oral capsule: 1 cap(s) orally once a day (at bedtime) abiraterone 250 mg oral tablet: 4 tab(s) orally once a day  acetaminophen 325 mg oral tablet: 2 tab(s) orally once, As needed, Mild Pain (1 - 3)  amoxicillin-clavulanate 875 mg-125 mg oral tablet: 1 tab(s) orally 2 times a day until Dec 18th  aspirin 81 mg oral delayed release tablet: 1 tab(s) orally once a day  dronabinol 2.5 mg oral capsule: 1 cap(s) orally 3 times a day (before meals)  finasteride 5 mg oral tablet: 1 tab(s) orally once a day  furosemide 40 mg oral tablet: 1 tab(s) orally once a day  ipratropium-albuterol 0.5 mg-2.5 mg/3 mLinhalation solution: 3 milliliter(s) inhaled every 6 hours, As needed, Shortness of Breath and/or Wheezing  lactulose 10 g/15 mL oral syrup: 30 milliliter(s) orally once a day  lisinopril 20 mg oral tablet: 1 tab(s) orally once a day  Multiple Vitamins oral tablet: 1 tab(s) orally once a day  oxybutynin 5 mg oral tablet: 1 tab(s) orally 2 times a day   oxyCODONE 10 mg oral tablet: 1 tab(s) orally every 4 hours, As needed, Moderate Pain (4 - 6)  predniSONE 5 mg oral tablet: 1 tab(s) orally once a day  Ranexa 500 mg oral tablet, extended release: 1 tab(s) orally 2 times a day  senna oral tablet: 2 tab(s) orally once a day (at bedtime)  tamsulosin 0.4 mg oral capsule: 1 cap(s) orally once a day (at bedtime)

## 2019-12-13 NOTE — PROGRESS NOTE ADULT - ASSESSMENT
Mr. Milton is a 75 yo male patient with PMH of BPH, HTN, DM, CAD, high grade urothelial carcinoma/metastatic to liver and lungs (following at [facility]) comes to the ED for Abdominal pain and fever for 3 days duration.    #Acute complicated UTI in the setting of high grade urothelial carcinoma and chronic palomo catheter  - Positive UA  - BCx 12/08 NGTD  - UCx 11/08 <49k E fecalis  - Per Urology, palomo leaking likely due to bladder spasm, primary solution would be antispasmodics, but not recommended due to side effects  - Per ID, continue PO Augmentin 875mg BID for 7 days (12/11-12/18)    #Dyspnea  - Continue Duonebs q6H PRN and Lasix 40mg PO Daily    #Jaundice (High direct bilirubin)  - Patient with RUQ tenderness  - Known mets to the liver, unclear if getting worse. PET scan done last week (no results as per daughter, she will try to bring the results)  - CT scan + RUQ US: cholelithiasis, no cholecystitis, no CBD dilatation  - HIDA scan: no cholecystitis  - MRCP: Cholelithiasis; No choledocholithiasis; No biliary ductal dilation. Hepatic/Lung metastases.    #Malnutrition, hypoalbuminemia severe hypokalemia  - Per nutrition, added Prosource and Ensure to diet  - Monitor, replete as needed    #High grade urothelial carcinoma with metastasis to liver and lung  - Follows with Dr. Hook ([phone]) at [facility]  - Patient had recent PET/CT, follow up results  - Continue Zytiga  - Continue Prednisone 5mg Daily    #Prolonged QTc  - QTc >700 on admission, likely due to hypokalemia and hypomagnesemia  - Down to ~500, telemetry discontinued     #DM  - Continue Lantus 10 qHS, Sliding scale    #CAD  - Continue home meds    #HTN  - Continue Lisinopril 20mg daily    #Pending: PT JUANI cannon  #Diet: Clear Liquids  #GI Prophylaxis: Protonix 40mg PO Daily  #DVT Prophylaxis: Holding Lovenox for hematuria  #Activity: Ambulate as tolerated  #Code Status: DNR/DNI Mr. Milton is a 75 yo male patient with PMH of BPH, HTN, DM, CAD, high grade urothelial carcinoma/metastatic to liver and lungs (following at [facility]) comes to the ED for Abdominal pain and fever for 3 days duration.    #Acute complicated UTI in the setting of high grade urothelial carcinoma and chronic palomo catheter  - Positive UA  - BCx 12/08 NGTD  - UCx 11/08 <49k E fecalis  - Per Urology, palomo leaking likely due to bladder spasm, primary solution would be antispasmodics, but not recommended due to side effects  - Per ID, continue PO Augmentin 875mg BID for 7 days (12/11-12/18)    #Dyspnea  - Continue Duonebs q6H PRN and Lasix 40mg PO Daily    #Jaundice (High direct bilirubin)  - Patient with RUQ tenderness  - Known mets to the liver, unclear if getting worse. PET scan done last week (no results as per daughter, she will try to bring the results)  - CT scan + RUQ US: cholelithiasis, no cholecystitis, no CBD dilatation  - HIDA scan: no cholecystitis  - MRCP: Cholelithiasis; No choledocholithiasis; No biliary ductal dilation. Hepatic/Lung metastases.    #Malnutrition, hypoalbuminemia severe hypokalemia  - Per nutrition, added Prosource and Ensure to diet  - Monitor, replete as needed  - Increased Dronabinol to 2.5mg TID with meals    #High grade urothelial carcinoma with metastasis to liver and lung  - Follows with Dr. Hook ([phone]) at [facility]  - Patient had recent PET/CT, follow up results  - Continue Zytiga  - Continue Prednisone 5mg Daily    #Prolonged QTc  - QTc >700 on admission, likely due to hypokalemia and hypomagnesemia  - Down to ~500, telemetry discontinued     #DM  - Continue Lantus 10 qHS, Sliding scale    #CAD  - Continue home meds    #HTN  - Continue Lisinopril 20mg daily    #Pending: PT eval, JUANI auth  #Diet: Clear Liquids  #GI Prophylaxis: Protonix 40mg PO Daily  #DVT Prophylaxis: Holding Lovenox for hematuria  #Activity: Ambulate as tolerated  #Code Status: DNR/DNI

## 2019-12-13 NOTE — PHYSICAL THERAPY INITIAL EVALUATION ADULT - GENERAL OBSERVATIONS, REHAB EVAL
13-30-14:00. Pt encountered semifowler in bed in NAD, + palomo with leaking noted. Dr. Hoenle and Ida yeung,, +spouse at b/s, + IV L UE, Poly PCA present at b/s. Pt agreeable to PT.

## 2019-12-13 NOTE — PHYSICAL THERAPY INITIAL EVALUATION ADULT - IMPAIRMENTS CONTRIBUTING TO GAIT DEVIATIONS, PT EVAL
decreased strength/impaired balance/decreased endurance. Pt found to have labored breathing. O2 Sat Rate 92-93% CLAUDETTE Prieto RN notifed and aware.

## 2019-12-13 NOTE — PROGRESS NOTE ADULT - ASSESSMENT
73 yo male with PMH of BPH, HTN, DM, CAD, high grade urothelial carcinoma/metastatic comes to the ED for Abdominal pain and fever for 3 days duration.    # Elevated liver enzymes, with abd pain and ? fever at home:  Likely progression of metastatic liver disease,   patient continues to improve on physical exam today, liver enzymes have increased once again however patient has no acute complaints of the abdomen and is having bowel movements  - as previous imaging shows metastatic disease found in the liver consistent with diagnosed advanced bladder cancer  (patient follows up in [facility])  - currently on chemotherapy  - MRCP on 12/10 showed no biliary obstruction, cholelithiasis and multiple metastatic lesions  - c/w antibiotics for now as per ID  - pain control  - pt and family now likely opting for eventual hospice care, will sign off, if need for any new or acute concerns please contact team at any time    # Anemia, normocytic, likely secondary to chronic disease  - stable  - no active signs of bleeding

## 2019-12-13 NOTE — DISCHARGE NOTE PROVIDER - NSDCCPCAREPLAN_GEN_ALL_CORE_FT
PRINCIPAL DISCHARGE DIAGNOSIS  Diagnosis: Acute cystitis  Assessment and Plan of Treatment: You were admitted to the hospital for cystitis. While in the hospital you were evaluated by infectious disease, who recommended a 7 day course of oral Augmentin 875mg twice a day. Please take your medication as prescribed and follow up with your primary doctor after you are discharged.      SECONDARY DISCHARGE DIAGNOSES  Diagnosis: Transaminitis  Assessment and Plan of Treatment: While in the hospital your liver enzymes were elevated, likely due to liver metastases from your urothelial carcinoma. Your CT scan, HIDA scan, and MRCP showed no cholecystitis or obstruction. Please follow up with your oncologist after you are discharged from the hospital. PRINCIPAL DISCHARGE DIAGNOSIS  Diagnosis: Acute cystitis  Assessment and Plan of Treatment: You were admitted to the hospital for cystitis. While in the hospital you were evaluated by infectious disease, who recommended a 7 day course of oral Augmentin 875mg twice a day (until December 18th). Please take your medication as prescribed and follow up with your primary doctor after you are discharged.      SECONDARY DISCHARGE DIAGNOSES  Diagnosis: Transaminitis  Assessment and Plan of Treatment: While in the hospital your liver enzymes were elevated, likely due to liver metastases from your urothelial carcinoma. Your CT scan, HIDA scan, and MRCP showed no cholecystitis or obstruction. Please follow up with your oncologist after you are discharged from the hospital.

## 2019-12-13 NOTE — DISCHARGE NOTE PROVIDER - NSDCFUSCHEDAPPT_GEN_ALL_CORE_FT
EMIGDIO RUBI ; 12/18/2019 ; [facility] [address] [place] [address] EMIGDIO RUBI ; 12/18/2019 ; [facility] [address] [place] [address] EMIGDIO RUBI ; 12/18/2019 ; [facility] [address] [place] [address]

## 2019-12-13 NOTE — PHYSICAL THERAPY INITIAL EVALUATION ADULT - PERTINENT HX OF CURRENT PROBLEM, REHAB EVAL
75 yo male with PMH of BPH, HTN, DM, CAD, high grade urothelial carcinoma/metastatic comes to the ED for Abdominal pain and fever for 3 days duration.

## 2019-12-13 NOTE — PROGRESS NOTE ADULT - ATTENDING COMMENTS
I saw and evaluated patient  by bedside, no dyspnea today,  patient remains very deconditioned, c/o occasional right upper and right lower abdominal pain, no hypoxia at rest today, has persistent leaking palomo catheter.  All labs, radiology studies, VS was reviewed  I have reviewed the resident's note and agree with documented findings and  plan of care.  - Progressive stage 4 urothelial carcinoma with metastatic disease to lungs, liver with very poor prognosis,  continue palliative hormonal tx.    - Acute transaminitis due to metastatic liver disease- MRCP- no biliary system obstruction, continue supportive tx.   -Acute Hypoxic respiratory failure - clinically has improved post lasix tx, and starting nebs, Oxygen tx. transitioned to po lasix tx.  - Complicated UTI - IV abx tx. -  switched to PO abx.   -Hypomangesemia, Hypokalemia - supplemented  -Malnutrition - continue supportive tx.     Patient's overall prognosis very poor    #Progress Note Handoff: continue abx tx. , d/c planning to SNF with transition to hospice at SNF  Family discussion: medical problems and plan was d/w patient and patient's daughter  by bedside Disposition: SNF once bed available

## 2019-12-13 NOTE — DISCHARGE NOTE PROVIDER - CARE PROVIDER_API CALL
Nikolay Guajardo)  [facility]  [address] [facility], [address]  [place] [address]  Phone: [phone]  Fax: [phone]  Follow Up Time: 1 week

## 2019-12-13 NOTE — PROGRESS NOTE ADULT - SUBJECTIVE AND OBJECTIVE BOX
Gastroenterology progress note:      74y year old Male with a chief complaint of : Patient is a 74y old  Male who presents with a chief complaint of Abdominal pain/UTI (13 Dec 2019 12:14)       We are following the patient for: elevated liver enzymes     Interval History:  Patient was admitted for findings of subjective fever and chills.  Later found to have a UTI which was treated with IV abx.  Patient and family have at this time opted for snf placement and hospice care.  Discussed previously with family findings of liver enzyme elvatations and that likely this is due to metastatic disease in the liver.  No biliary tree obstruction at this time.          Review of Systems:   Cardiovascular:  No Chest Pain, No Palpitations  HEENT: no lymphadenopathy, no ear pain, no oral pain  Genitourinary no dysuria, no hematuria   Musculoskeletal:	 no joint pain, no myalgia  Neurological: AAox3  Respiratory:  No Cough, No Dyspnea  Gastrointestinal:  no tenderness, slightly distended with some guarding, bs positive  Skin:  No Skin Lesions, Jaundice present       PMHX  PAST MEDICAL & SURGICAL HISTORY:  Bladder cancer  CAD, multiple vessel  Anemia  PVD (peripheral vascular disease)  Diabetes mellitus: type 2  Hypertension  Prostate CA: with radiation  S/P coronary artery stent placement  H/O hernia repair      MEDICATIONS  (STANDING):  abiraterone 1000 milliGRAM(s) Oral daily  amoxicillin  875 milliGRAM(s)/clavulanate 1 Tablet(s) Oral two times a day  aspirin enteric coated 81 milliGRAM(s) Oral daily  atorvastatin Oral Tab/Cap - Peds 10 milliGRAM(s) Oral daily  chlorhexidine 4% Liquid 1 Application(s) Topical <User Schedule>  dronabinol 2.5 milliGRAM(s) Oral three times a day before meals  finasteride 5 milliGRAM(s) Oral daily  furosemide    Tablet 40 milliGRAM(s) Oral daily  insulin glargine Injectable (LANTUS) 10 Unit(s) SubCutaneous at bedtime  insulin lispro (HumaLOG) corrective regimen sliding scale   SubCutaneous three times a day before meals  lactulose Syrup 10 Gram(s) Oral two times a day  lisinopril 20 milliGRAM(s) Oral daily  multivitamin 1 Tablet(s) Oral daily  oxybutynin 5 milliGRAM(s) Oral daily  phenazopyridine 200 milliGRAM(s) Oral three times a day  polyethylene glycol 3350 17 Gram(s) Oral daily  potassium chloride  20 mEq/100 mL IVPB 20 milliEquivalent(s) IV Intermittent every 2 hours  predniSONE   Tablet 5 milliGRAM(s) Oral daily  ranolazine 500 milliGRAM(s) Oral two times a day  senna 2 Tablet(s) Oral at bedtime  tamsulosin Oral Tab/Cap - Peds 0.4 milliGRAM(s) Oral at bedtime    MEDICATIONS  (PRN):  albuterol/ipratropium for Nebulization 3 milliLiter(s) Nebulizer every 6 hours PRN Shortness of Breath and/or Wheezing  morphine  - Injectable 4 milliGRAM(s) IV Push every 6 hours PRN Moderate Pain (4 - 6)      No Known Allergies       Physical Examination:  T(C): 35.9 (12-13-19 @ 06:05), Max: 37.1 (12-12-19 @ 21:00)  HR: 101 (12-13-19 @ 06:05) (95 - 105)  BP: 161/79 (12-13-19 @ 06:05) (143/76 - 161/79)  RR: 18 (12-13-19 @ 06:05) (18 - 18)  SpO2: 92% (12-13-19 @ 09:57) (92% - 99%)  Constitutional: No acute distress., lethargic  Respiratory:  No signs of respiratory distress. Lung sounds are clear bilaterally.  Cardiovascular:  S1 S2, Regular rate and rhythm.  Abdominal: Abdomen distended, symmetric, and tender There are no visible lesions or scars. Bowel sounds are present and normoactive in all four quadrants. No masses  Skin: No rashes, Jaundice appearing  HEENT: no thyromegaly noted  Neurological: no focal deficits, strength 4/4 upper and lower extremities, no sensory deficits        Data:                        8.4    15.05 )-----------( 191      ( 13 Dec 2019 04:30 )             26.1     12-13    141  |  100  |  13  ----------------------------<  158<H>  3.4<L>   |  21  |  0.7    Ca    7.9<L>      13 Dec 2019 04:30  Mg     1.8     12-13    TPro  5.6<L>  /  Alb  2.3<L>  /  TBili  5.6<H>  /  DBili  x   /  AST  223<H>  /  ALT  64<H>  /  AlkPhos  242<H>  12-13    LIVER FUNCTIONS - ( 13 Dec 2019 04:30 )  Alb: 2.3 g/dL / Pro: 5.6 g/dL / ALK PHOS: 242 U/L / ALT: 64 U/L / AST: 223 U/L / GGT: x

## 2019-12-13 NOTE — DISCHARGE NOTE PROVIDER - HOSPITAL COURSE
73 yo male with PMH of BPH, HTN, DM, CAD, high grade urothelial carcinoma/metastatic comes to the ED for Abdominal pain and fever for 3 days duration.        pain in lower abdomen, sharp, 4/10, aggravates on straining or pressing lower abdomen.    Fever subjective, low grade, associated with nausea, 1 episode non billious non bloody vomiting, severe loss of appetite, and severe generalized weakness. patient also complains of dry cough worse on deep inspiration. No headache, photophobia, chest pain, vertigo, LE edema.        patient has been recently diagnosed with High grade urothelial carcinoma taking chemo pills 2 pills per day for last 2 months. follows up with Dr Hook at [facility].; recently had PET/CT done        In the ED, patient Tachycardic, severe generalized weakness, prolonger QT, severe hypokalemia.        Patient was admitted for acute complicated UTI in the setting of high grade urothelial carcinoma and chronic palomo catheter    Positive UA    BCx 12/08 NGTD    UCx 11/08 <49k E fecalis    Per Urology, palomo leaking likely due to bladder spasm, primary solution would be antispasmodics, but not recommended due to side effects    Per ID, PO Augmentin 875mg BID for 7 days (12/11-12/18)        Dyspnea    Duonebs q6H PRN and Lasix 40mg PO Daily        Jaundice (High direct bilirubin)    RUQ tenderness    Known mets to the liver, unclear if getting worse. PET scan done last week (no results as per daughter, she will try to bring the results)    CT scan + RUQ US: cholelithiasis, no cholecystitis, no CBD dilatation    HIDA scan: no cholecystitis    MRCP: Cholelithiasis; No choledocholithiasis; No biliary ductal dilation. Hepatic/Lung metastases.        Malnutrition, hypoalbuminemia severe hypokalemia    Per nutrition, added Prosource and Ensure to diet    Dronabinol 2.5mg TID with meals        High grade urothelial carcinoma with metastasis to liver and lung    Follows with Dr. Hook ([phone]) at [facility]    Patient had recent PET/CT    Zytiga    Prednisone 5mg Daily

## 2019-12-14 LAB
ALBUMIN SERPL ELPH-MCNC: 2.2 G/DL — LOW (ref 3.5–5.2)
ALP SERPL-CCNC: 231 U/L — HIGH (ref 30–115)
ALT FLD-CCNC: 92 U/L — HIGH (ref 0–41)
ANION GAP SERPL CALC-SCNC: 15 MMOL/L — HIGH (ref 7–14)
AST SERPL-CCNC: 343 U/L — HIGH (ref 0–41)
BASOPHILS # BLD AUTO: 0.01 K/UL — SIGNIFICANT CHANGE UP (ref 0–0.2)
BASOPHILS # BLD AUTO: 0.02 K/UL — SIGNIFICANT CHANGE UP (ref 0–0.2)
BASOPHILS NFR BLD AUTO: 0.1 % — SIGNIFICANT CHANGE UP (ref 0–1)
BASOPHILS NFR BLD AUTO: 0.1 % — SIGNIFICANT CHANGE UP (ref 0–1)
BILIRUB SERPL-MCNC: 6.1 MG/DL — HIGH (ref 0.2–1.2)
BLD GP AB SCN SERPL QL: SIGNIFICANT CHANGE UP
BUN SERPL-MCNC: 15 MG/DL — SIGNIFICANT CHANGE UP (ref 10–20)
CALCIUM SERPL-MCNC: 7.6 MG/DL — LOW (ref 8.5–10.1)
CHLORIDE SERPL-SCNC: 100 MMOL/L — SIGNIFICANT CHANGE UP (ref 98–110)
CO2 SERPL-SCNC: 25 MMOL/L — SIGNIFICANT CHANGE UP (ref 17–32)
CREAT SERPL-MCNC: 0.8 MG/DL — SIGNIFICANT CHANGE UP (ref 0.7–1.5)
CULTURE RESULTS: SIGNIFICANT CHANGE UP
CULTURE RESULTS: SIGNIFICANT CHANGE UP
EOSINOPHIL # BLD AUTO: 0.02 K/UL — SIGNIFICANT CHANGE UP (ref 0–0.7)
EOSINOPHIL # BLD AUTO: 0.08 K/UL — SIGNIFICANT CHANGE UP (ref 0–0.7)
EOSINOPHIL NFR BLD AUTO: 0.2 % — SIGNIFICANT CHANGE UP (ref 0–8)
EOSINOPHIL NFR BLD AUTO: 0.6 % — SIGNIFICANT CHANGE UP (ref 0–8)
GLUCOSE BLDC GLUCOMTR-MCNC: 190 MG/DL — HIGH (ref 70–99)
GLUCOSE BLDC GLUCOMTR-MCNC: 194 MG/DL — HIGH (ref 70–99)
GLUCOSE BLDC GLUCOMTR-MCNC: 267 MG/DL — HIGH (ref 70–99)
GLUCOSE BLDC GLUCOMTR-MCNC: 319 MG/DL — HIGH (ref 70–99)
GLUCOSE SERPL-MCNC: 145 MG/DL — HIGH (ref 70–99)
HCT VFR BLD CALC: 25.5 % — LOW (ref 42–52)
HCT VFR BLD CALC: 25.8 % — LOW (ref 42–52)
HGB BLD-MCNC: 8 G/DL — LOW (ref 14–18)
HGB BLD-MCNC: 8 G/DL — LOW (ref 14–18)
IMM GRANULOCYTES NFR BLD AUTO: 1.9 % — HIGH (ref 0.1–0.3)
IMM GRANULOCYTES NFR BLD AUTO: 2.1 % — HIGH (ref 0.1–0.3)
INR BLD: 1.77 RATIO — HIGH (ref 0.65–1.3)
LYMPHOCYTES # BLD AUTO: 1.94 K/UL — SIGNIFICANT CHANGE UP (ref 1.2–3.4)
LYMPHOCYTES # BLD AUTO: 15.3 % — LOW (ref 20.5–51.1)
LYMPHOCYTES # BLD AUTO: 15.5 % — LOW (ref 20.5–51.1)
LYMPHOCYTES # BLD AUTO: 2.24 K/UL — SIGNIFICANT CHANGE UP (ref 1.2–3.4)
MAGNESIUM SERPL-MCNC: 2 MG/DL — SIGNIFICANT CHANGE UP (ref 1.8–2.4)
MCHC RBC-ENTMCNC: 27.1 PG — SIGNIFICANT CHANGE UP (ref 27–31)
MCHC RBC-ENTMCNC: 27.5 PG — SIGNIFICANT CHANGE UP (ref 27–31)
MCHC RBC-ENTMCNC: 31 G/DL — LOW (ref 32–37)
MCHC RBC-ENTMCNC: 31.4 G/DL — LOW (ref 32–37)
MCV RBC AUTO: 87.5 FL — SIGNIFICANT CHANGE UP (ref 80–94)
MCV RBC AUTO: 87.6 FL — SIGNIFICANT CHANGE UP (ref 80–94)
MONOCYTES # BLD AUTO: 0.91 K/UL — HIGH (ref 0.1–0.6)
MONOCYTES # BLD AUTO: 1.31 K/UL — HIGH (ref 0.1–0.6)
MONOCYTES NFR BLD AUTO: 7.2 % — SIGNIFICANT CHANGE UP (ref 1.7–9.3)
MONOCYTES NFR BLD AUTO: 9.1 % — SIGNIFICANT CHANGE UP (ref 1.7–9.3)
NEUTROPHILS # BLD AUTO: 10.51 K/UL — HIGH (ref 1.4–6.5)
NEUTROPHILS # BLD AUTO: 9.53 K/UL — HIGH (ref 1.4–6.5)
NEUTROPHILS NFR BLD AUTO: 72.8 % — SIGNIFICANT CHANGE UP (ref 42.2–75.2)
NEUTROPHILS NFR BLD AUTO: 75.1 % — SIGNIFICANT CHANGE UP (ref 42.2–75.2)
NRBC # BLD: 0 /100 WBCS — SIGNIFICANT CHANGE UP (ref 0–0)
NRBC # BLD: 0 /100 WBCS — SIGNIFICANT CHANGE UP (ref 0–0)
PLATELET # BLD AUTO: 196 K/UL — SIGNIFICANT CHANGE UP (ref 130–400)
PLATELET # BLD AUTO: 197 K/UL — SIGNIFICANT CHANGE UP (ref 130–400)
POTASSIUM SERPL-MCNC: 3.3 MMOL/L — LOW (ref 3.5–5)
POTASSIUM SERPL-SCNC: 3.3 MMOL/L — LOW (ref 3.5–5)
PROT SERPL-MCNC: 5.5 G/DL — LOW (ref 6–8)
PROTHROM AB SERPL-ACNC: 20.2 SEC — HIGH (ref 9.95–12.87)
RBC # BLD: 2.91 M/UL — LOW (ref 4.7–6.1)
RBC # BLD: 2.95 M/UL — LOW (ref 4.7–6.1)
RBC # FLD: 17.9 % — HIGH (ref 11.5–14.5)
RBC # FLD: 18.1 % — HIGH (ref 11.5–14.5)
SODIUM SERPL-SCNC: 140 MMOL/L — SIGNIFICANT CHANGE UP (ref 135–146)
SPECIMEN SOURCE: SIGNIFICANT CHANGE UP
SPECIMEN SOURCE: SIGNIFICANT CHANGE UP
WBC # BLD: 12.67 K/UL — HIGH (ref 4.8–10.8)
WBC # BLD: 14.44 K/UL — HIGH (ref 4.8–10.8)
WBC # FLD AUTO: 12.67 K/UL — HIGH (ref 4.8–10.8)
WBC # FLD AUTO: 14.44 K/UL — HIGH (ref 4.8–10.8)

## 2019-12-14 PROCEDURE — 99233 SBSQ HOSP IP/OBS HIGH 50: CPT

## 2019-12-14 RX ORDER — DRONABINOL 2.5 MG
1 CAPSULE ORAL
Qty: 0 | Refills: 0 | DISCHARGE
Start: 2019-12-14

## 2019-12-14 RX ORDER — LACTULOSE 10 G/15ML
20 SOLUTION ORAL DAILY
Refills: 0 | Status: DISCONTINUED | OUTPATIENT
Start: 2019-12-14 | End: 2019-12-16

## 2019-12-14 RX ORDER — LISINOPRIL/HYDROCHLOROTHIAZIDE 10-12.5 MG
1 TABLET ORAL
Qty: 0 | Refills: 0 | DISCHARGE

## 2019-12-14 RX ORDER — POTASSIUM CHLORIDE 20 MEQ
20 PACKET (EA) ORAL
Refills: 0 | Status: COMPLETED | OUTPATIENT
Start: 2019-12-14 | End: 2019-12-14

## 2019-12-14 RX ORDER — ASPIRIN/CALCIUM CARB/MAGNESIUM 324 MG
1 TABLET ORAL
Qty: 0 | Refills: 0 | DISCHARGE
Start: 2019-12-14

## 2019-12-14 RX ORDER — IPRATROPIUM/ALBUTEROL SULFATE 18-103MCG
3 AEROSOL WITH ADAPTER (GRAM) INHALATION
Qty: 0 | Refills: 0 | DISCHARGE
Start: 2019-12-14

## 2019-12-14 RX ORDER — SENNA PLUS 8.6 MG/1
2 TABLET ORAL
Qty: 0 | Refills: 0 | DISCHARGE
Start: 2019-12-14

## 2019-12-14 RX ORDER — OXYCODONE HYDROCHLORIDE 5 MG/1
1 TABLET ORAL
Qty: 0 | Refills: 0 | DISCHARGE
Start: 2019-12-14

## 2019-12-14 RX ORDER — SITAGLIPTIN AND METFORMIN HYDROCHLORIDE 500; 50 MG/1; MG/1
0 TABLET, FILM COATED ORAL
Qty: 0 | Refills: 0 | DISCHARGE

## 2019-12-14 RX ORDER — POTASSIUM CHLORIDE 20 MEQ
20 PACKET (EA) ORAL ONCE
Refills: 0 | Status: COMPLETED | OUTPATIENT
Start: 2019-12-14 | End: 2019-12-14

## 2019-12-14 RX ORDER — POTASSIUM CHLORIDE 20 MEQ
20 PACKET (EA) ORAL
Refills: 0 | Status: DISCONTINUED | OUTPATIENT
Start: 2019-12-14 | End: 2019-12-14

## 2019-12-14 RX ORDER — FUROSEMIDE 40 MG
1 TABLET ORAL
Qty: 0 | Refills: 0 | DISCHARGE
Start: 2019-12-14

## 2019-12-14 RX ORDER — ATORVASTATIN CALCIUM 80 MG/1
1 TABLET, FILM COATED ORAL
Qty: 0 | Refills: 0 | DISCHARGE

## 2019-12-14 RX ORDER — LACTULOSE 10 G/15ML
30 SOLUTION ORAL
Qty: 0 | Refills: 0 | DISCHARGE
Start: 2019-12-14

## 2019-12-14 RX ORDER — ABIRATERONE ACETATE 250 MG/1
4 TABLET ORAL
Qty: 0 | Refills: 0 | DISCHARGE
Start: 2019-12-14

## 2019-12-14 RX ORDER — LISINOPRIL 2.5 MG/1
1 TABLET ORAL
Qty: 0 | Refills: 0 | DISCHARGE
Start: 2019-12-14

## 2019-12-14 RX ORDER — OXYCODONE HYDROCHLORIDE 5 MG/1
10 TABLET ORAL EVERY 4 HOURS
Refills: 0 | Status: DISCONTINUED | OUTPATIENT
Start: 2019-12-14 | End: 2019-12-16

## 2019-12-14 RX ADMIN — CHLORHEXIDINE GLUCONATE 1 APPLICATION(S): 213 SOLUTION TOPICAL at 05:32

## 2019-12-14 RX ADMIN — INSULIN GLARGINE 10 UNIT(S): 100 INJECTION, SOLUTION SUBCUTANEOUS at 22:09

## 2019-12-14 RX ADMIN — ATORVASTATIN CALCIUM 10 MILLIGRAM(S): 80 TABLET, FILM COATED ORAL at 12:05

## 2019-12-14 RX ADMIN — Medication 2.5 MILLIGRAM(S): at 17:14

## 2019-12-14 RX ADMIN — Medication 20 MILLIEQUIVALENT(S): at 12:04

## 2019-12-14 RX ADMIN — Medication 5 MILLIGRAM(S): at 12:04

## 2019-12-14 RX ADMIN — ABIRATERONE ACETATE 1000 MILLIGRAM(S): 250 TABLET ORAL at 05:32

## 2019-12-14 RX ADMIN — Medication 2.5 MILLIGRAM(S): at 12:04

## 2019-12-14 RX ADMIN — Medication 40 MILLIGRAM(S): at 05:31

## 2019-12-14 RX ADMIN — Medication 50 MILLIEQUIVALENT(S): at 09:43

## 2019-12-14 RX ADMIN — FINASTERIDE 5 MILLIGRAM(S): 5 TABLET, FILM COATED ORAL at 12:05

## 2019-12-14 RX ADMIN — RANOLAZINE 500 MILLIGRAM(S): 500 TABLET, FILM COATED, EXTENDED RELEASE ORAL at 05:31

## 2019-12-14 RX ADMIN — Medication 2.5 MILLIGRAM(S): at 08:13

## 2019-12-14 RX ADMIN — Medication 3: at 12:05

## 2019-12-14 RX ADMIN — Medication 5 MILLIGRAM(S): at 05:31

## 2019-12-14 RX ADMIN — TAMSULOSIN HYDROCHLORIDE 0.4 MILLIGRAM(S): 0.4 CAPSULE ORAL at 22:08

## 2019-12-14 RX ADMIN — Medication 1 TABLET(S): at 05:31

## 2019-12-14 RX ADMIN — LACTULOSE 10 GRAM(S): 10 SOLUTION ORAL at 05:31

## 2019-12-14 RX ADMIN — Medication 4: at 17:15

## 2019-12-14 RX ADMIN — Medication 50 MILLIEQUIVALENT(S): at 14:46

## 2019-12-14 RX ADMIN — SENNA PLUS 2 TABLET(S): 8.6 TABLET ORAL at 22:08

## 2019-12-14 RX ADMIN — Medication 3 MILLILITER(S): at 05:37

## 2019-12-14 RX ADMIN — RANOLAZINE 500 MILLIGRAM(S): 500 TABLET, FILM COATED, EXTENDED RELEASE ORAL at 17:14

## 2019-12-14 RX ADMIN — LISINOPRIL 20 MILLIGRAM(S): 2.5 TABLET ORAL at 05:31

## 2019-12-14 RX ADMIN — Medication 1 TABLET(S): at 12:05

## 2019-12-14 RX ADMIN — Medication 81 MILLIGRAM(S): at 12:05

## 2019-12-14 RX ADMIN — Medication 1: at 08:14

## 2019-12-14 RX ADMIN — OXYCODONE HYDROCHLORIDE 10 MILLIGRAM(S): 5 TABLET ORAL at 12:03

## 2019-12-14 RX ADMIN — Medication 1 TABLET(S): at 17:15

## 2019-12-14 RX ADMIN — Medication 50 MILLIEQUIVALENT(S): at 12:05

## 2019-12-14 RX ADMIN — OXYCODONE HYDROCHLORIDE 10 MILLIGRAM(S): 5 TABLET ORAL at 12:33

## 2019-12-14 NOTE — PROGRESS NOTE ADULT - SUBJECTIVE AND OBJECTIVE BOX
Patient Information:  EMIGDIO RUBI / 74y / Male / MRN#:[id number]    Hospital Day: 5d    HPI:  75 yo male with PMH of BPH, HTN, DM, CAD, high grade urothelial carcinoma/metastatic comes to the ED for Abdominal pain and fever for 3 days duration.  pain in lower abdomen, sharp, 4/10, aggravates on straining or pressing lower abdomen.  Fever subjective, low grade, associated with nausea, 1 episode non billious non bloody vomiting, severe loss of appetite, and severe generalized weakness.  patient also complains of dry cough worse on deep inspiration.  No headache, photophobia, chest pain, vertigo, LE edema.    patient has been recently diagnosed with High grade urothelial carcinoma taking chemo pills 2 pills per day for last 2 months.  follows up with Dr Hook at [facility].; recently had PET/CT done; has an appointment there tomorrow.    In the ED, patient Tachycardic, severe generalized weakness, prolonger QT, severe hypokalemia.    Interval History:  Patient seen and examined at bedside. No acute events overnight.    Past Medical History:  Bladder cancer  CAD, multiple vessel  Anemia  PVD (peripheral vascular disease)  Diabetes mellitus  Hypertension  Prostate CA    Past Surgical History:  S/P coronary artery stent placement  H/O hernia repair  No significant past surgical history    Allergies:  No Known Allergies    Medications:  PRN:  albuterol/ipratropium for Nebulization 3 milliLiter(s) Nebulizer every 6 hours PRN Shortness of Breath and/or Wheezing  morphine  - Injectable 4 milliGRAM(s) IV Push every 6 hours PRN Moderate Pain (4 - 6)  oxyCODONE    IR 10 milliGRAM(s) Oral every 4 hours PRN Moderate Pain (4 - 6)    Standing:  abiraterone 1000 milliGRAM(s) Oral daily  amoxicillin  875 milliGRAM(s)/clavulanate 1 Tablet(s) Oral two times a day  aspirin enteric coated 81 milliGRAM(s) Oral daily  atorvastatin Oral Tab/Cap - Peds 10 milliGRAM(s) Oral daily  chlorhexidine 4% Liquid 1 Application(s) Topical <User Schedule>  dronabinol 2.5 milliGRAM(s) Oral three times a day before meals  finasteride 5 milliGRAM(s) Oral daily  furosemide    Tablet 40 milliGRAM(s) Oral daily  insulin glargine Injectable (LANTUS) 10 Unit(s) SubCutaneous at bedtime  insulin lispro (HumaLOG) corrective regimen sliding scale   SubCutaneous three times a day before meals  lactulose Syrup 20 Gram(s) Oral daily  lisinopril 20 milliGRAM(s) Oral daily  multivitamin 1 Tablet(s) Oral daily  oxybutynin 5 milliGRAM(s) Oral daily  polyethylene glycol 3350 17 Gram(s) Oral daily  potassium chloride   Powder 20 milliEquivalent(s) Oral once  potassium chloride  20 mEq/100 mL IVPB 20 milliEquivalent(s) IV Intermittent every 2 hours  predniSONE   Tablet 5 milliGRAM(s) Oral daily  ranolazine 500 milliGRAM(s) Oral two times a day  senna 2 Tablet(s) Oral at bedtime  tamsulosin Oral Tab/Cap - Peds 0.4 milliGRAM(s) Oral at bedtime    Home:  abiraterone 250 mg oral tablet: 4 tab(s) orally once a day  acetaminophen 325 mg oral tablet: 2 tab(s) orally once, As needed, Mild Pain (1 - 3)  amoxicillin-clavulanate 875 mg-125 mg oral tablet: 1 tab(s) orally 2 times a day until Dec 18th  aspirin 81 mg oral delayed release tablet: 1 tab(s) orally once a day  dronabinol 2.5 mg oral capsule: 1 cap(s) orally 3 times a day (before meals)  finasteride 5 mg oral tablet: 1 tab(s) orally once a day  furosemide 40 mg oral tablet: 1 tab(s) orally once a day  ipratropium-albuterol 0.5 mg-2.5 mg/3 mLinhalation solution: 3 milliliter(s) inhaled every 6 hours, As needed, Shortness of Breath and/or Wheezing  lactulose 10 g/15 mL oral syrup: 30 milliliter(s) orally once a day  lisinopril 20 mg oral tablet: 1 tab(s) orally once a day  Multiple Vitamins oral tablet: 1 tab(s) orally once a day  oxyCODONE 10 mg oral tablet: 1 tab(s) orally every 4 hours, As needed, Moderate Pain (4 - 6)  predniSONE 5 mg oral tablet: 1 tab(s) orally once a day  Ranexa 500 mg oral tablet, extended release: 1 tab(s) orally 2 times a day  senna oral tablet: 2 tab(s) orally once a day (at bedtime)    Vitals:  T(C): 36.6, Max: 36.6 (12-14-19 @ 05:53)  T(F): 97.8, Max: 97.8 (12-14-19 @ 05:53)  HR: 87 (85 - 97)  BP: 138/69 (131/74 - 147/69)  RR: 18 (18 - 19)  SpO2: 98% (92% - 98%)    Physical Exam:  General: Awake, Alert. Not in acute distress. Jaundiced  Heart: Regular rate and rhythm; S1, S2; No murmurs.  Lungs: Clear to auscultation bilaterally.  Abdomen: Soft, tender to palpation in RUQ/RLQ, nondistended.  Extremities: No edema in upper or lower extremities.  Neuro: AAOx3, No focal deficits.    Labs:  CBC (12-14 @ 05:44)                        Hgb: 8.0    WBC: 14.44 )-----------------( Plts: 197                              Hct: 25.8     Chem (12-14 @ 05:44)  Na: 140  |     Cl: 100     |  BUN: 15  -----------------------------------------< Gluc: 145    K: 3.3   |    HCO3: 25  |  Cr: 0.8    Ca 7.6 (12-14 @ 05:44)  Mg 2.0 (12-14 @ 05:44)    LFTs (12-14 @ 05:44)  TPro 5.5  /  Alb 2.2  TBili 6.1  /  DBili       /  ALT 92  /  AlkPhos 231    Microbiology:  Culture - Urine (collected 12-08 @ 22:10)  Source: .Urine Clean Catch (Midstream)  Final Report (12-10 @ 12:25):    >=3 organisms. Probable collection contamination.    Culture - Blood (collected 12-08 @ 20:45)  Source: .Blood Blood-Peripheral  Final Report (12-14 @ 08:00):    No growth at 5 days.    Culture - Blood (collected 12-08 @ 19:50)  Source: .Blood Blood-Peripheral  Final Report (12-14 @ 08:00):    No growth at 5 days. Head atraumatic, normal cephalic shape.

## 2019-12-14 NOTE — PROGRESS NOTE ADULT - ASSESSMENT
Mr. Milton is a 73 yo male patient with PMH of BPH, HTN, DM, CAD, high grade urothelial carcinoma/metastatic to liver and lungs (following at [facility]) comes to the ED for Abdominal pain and fever for 3 days duration.    #Acute complicated UTI in the setting of high grade urothelial carcinoma and chronic palomo catheter  - Positive UA  - BCx 12/08 NGTD  - UCx 11/08 <49k E fecalis  - Per Urology, palomo leaking likely due to bladder spasm, primary solution would be antispasmodics, but not recommended due to side effects  - Per ID, continue PO Augmentin 875mg BID for 7 days (12/11-12/18)    #Dyspnea  - Continue Duonebs q6H PRN and Lasix 40mg PO Daily    #Jaundice (High direct bilirubin)  - Patient with RUQ tenderness  - Known mets to the liver, unclear if getting worse. PET scan done last week (no results as per daughter, she will try to bring the results)  - CT scan + RUQ US: cholelithiasis, no cholecystitis, no CBD dilatation  - HIDA scan: no cholecystitis  - MRCP: Cholelithiasis; No choledocholithiasis; No biliary ductal dilation. Hepatic/Lung metastases.    #Malnutrition, hypoalbuminemia severe hypokalemia  - Per nutrition, added Prosource and Ensure to diet  - Monitor, replete as needed  - Continue Dronabinol 2.5mg TID with meals    #High grade urothelial carcinoma with metastasis to liver and lung  - Follows with Dr. Hook ([phone]) at [facility]  - Patient had recent PET/CT, follow up results  - Continue Zytiga  - Continue Prednisone 5mg Daily    #Prolonged QTc  - QTc >700 on admission, likely due to hypokalemia and hypomagnesemia  - Down to ~500, telemetry discontinued     #DM  - Continue Lantus 10 qHS, Sliding scale    #CAD  - Continue home meds    #HTN  - Continue Lisinopril 20mg daily    #Pending: JUANI bass  #Diet: Clear Liquids  #GI Prophylaxis: Protonix 40mg PO Daily  #DVT Prophylaxis: Holding Lovenox for hematuria  #Activity: Ambulate as tolerated  #Code Status: DNR/DNI

## 2019-12-15 LAB
ALBUMIN SERPL ELPH-MCNC: 2 G/DL — LOW (ref 3.5–5.2)
ALP SERPL-CCNC: 358 U/L — HIGH (ref 30–115)
ALT FLD-CCNC: 159 U/L — HIGH (ref 0–41)
ANION GAP SERPL CALC-SCNC: 17 MMOL/L — HIGH (ref 7–14)
AST SERPL-CCNC: 644 U/L — HIGH (ref 0–41)
BASOPHILS # BLD AUTO: 0.04 K/UL — SIGNIFICANT CHANGE UP (ref 0–0.2)
BASOPHILS NFR BLD AUTO: 0.3 % — SIGNIFICANT CHANGE UP (ref 0–1)
BILIRUB SERPL-MCNC: 8.4 MG/DL — HIGH (ref 0.2–1.2)
BUN SERPL-MCNC: 21 MG/DL — HIGH (ref 10–20)
CALCIUM SERPL-MCNC: 7.9 MG/DL — LOW (ref 8.5–10.1)
CHLORIDE SERPL-SCNC: 99 MMOL/L — SIGNIFICANT CHANGE UP (ref 98–110)
CO2 SERPL-SCNC: 24 MMOL/L — SIGNIFICANT CHANGE UP (ref 17–32)
CREAT SERPL-MCNC: 1 MG/DL — SIGNIFICANT CHANGE UP (ref 0.7–1.5)
EOSINOPHIL # BLD AUTO: 0.06 K/UL — SIGNIFICANT CHANGE UP (ref 0–0.7)
EOSINOPHIL NFR BLD AUTO: 0.4 % — SIGNIFICANT CHANGE UP (ref 0–8)
GLUCOSE BLDC GLUCOMTR-MCNC: 146 MG/DL — HIGH (ref 70–99)
GLUCOSE BLDC GLUCOMTR-MCNC: 197 MG/DL — HIGH (ref 70–99)
GLUCOSE BLDC GLUCOMTR-MCNC: 234 MG/DL — HIGH (ref 70–99)
GLUCOSE BLDC GLUCOMTR-MCNC: 325 MG/DL — HIGH (ref 70–99)
GLUCOSE SERPL-MCNC: 140 MG/DL — HIGH (ref 70–99)
HCT VFR BLD CALC: 26 % — LOW (ref 42–52)
HGB BLD-MCNC: 8.4 G/DL — LOW (ref 14–18)
IMM GRANULOCYTES NFR BLD AUTO: 2.1 % — HIGH (ref 0.1–0.3)
LYMPHOCYTES # BLD AUTO: 17.4 % — LOW (ref 20.5–51.1)
LYMPHOCYTES # BLD AUTO: 2.44 K/UL — SIGNIFICANT CHANGE UP (ref 1.2–3.4)
MAGNESIUM SERPL-MCNC: 1.9 MG/DL — SIGNIFICANT CHANGE UP (ref 1.8–2.4)
MCHC RBC-ENTMCNC: 27.9 PG — SIGNIFICANT CHANGE UP (ref 27–31)
MCHC RBC-ENTMCNC: 32.3 G/DL — SIGNIFICANT CHANGE UP (ref 32–37)
MCV RBC AUTO: 86.4 FL — SIGNIFICANT CHANGE UP (ref 80–94)
MONOCYTES # BLD AUTO: 1.1 K/UL — HIGH (ref 0.1–0.6)
MONOCYTES NFR BLD AUTO: 7.9 % — SIGNIFICANT CHANGE UP (ref 1.7–9.3)
NEUTROPHILS # BLD AUTO: 10.05 K/UL — HIGH (ref 1.4–6.5)
NEUTROPHILS NFR BLD AUTO: 71.9 % — SIGNIFICANT CHANGE UP (ref 42.2–75.2)
NRBC # BLD: 0 /100 WBCS — SIGNIFICANT CHANGE UP (ref 0–0)
PLATELET # BLD AUTO: 197 K/UL — SIGNIFICANT CHANGE UP (ref 130–400)
POTASSIUM SERPL-MCNC: 3.6 MMOL/L — SIGNIFICANT CHANGE UP (ref 3.5–5)
POTASSIUM SERPL-SCNC: 3.6 MMOL/L — SIGNIFICANT CHANGE UP (ref 3.5–5)
PROT SERPL-MCNC: 5.6 G/DL — LOW (ref 6–8)
RBC # BLD: 3.01 M/UL — LOW (ref 4.7–6.1)
RBC # FLD: 18.4 % — HIGH (ref 11.5–14.5)
SODIUM SERPL-SCNC: 140 MMOL/L — SIGNIFICANT CHANGE UP (ref 135–146)
WBC # BLD: 13.99 K/UL — HIGH (ref 4.8–10.8)
WBC # FLD AUTO: 13.99 K/UL — HIGH (ref 4.8–10.8)

## 2019-12-15 PROCEDURE — 99233 SBSQ HOSP IP/OBS HIGH 50: CPT

## 2019-12-15 RX ORDER — LACTOBACILLUS ACIDOPHILUS 100MM CELL
1 CAPSULE ORAL THREE TIMES A DAY
Refills: 0 | Status: DISCONTINUED | OUTPATIENT
Start: 2019-12-15 | End: 2019-12-16

## 2019-12-15 RX ORDER — INSULIN LISPRO 100/ML
7 VIAL (ML) SUBCUTANEOUS
Refills: 0 | Status: DISCONTINUED | OUTPATIENT
Start: 2019-12-15 | End: 2019-12-16

## 2019-12-15 RX ORDER — INSULIN LISPRO 100/ML
7 VIAL (ML) SUBCUTANEOUS ONCE
Refills: 0 | Status: COMPLETED | OUTPATIENT
Start: 2019-12-15 | End: 2019-12-15

## 2019-12-15 RX ORDER — IPRATROPIUM BROMIDE 0.2 MG/ML
500 SOLUTION, NON-ORAL INHALATION EVERY 6 HOURS
Refills: 0 | Status: DISCONTINUED | OUTPATIENT
Start: 2019-12-15 | End: 2019-12-16

## 2019-12-15 RX ADMIN — Medication 1 TABLET(S): at 22:56

## 2019-12-15 RX ADMIN — Medication 4: at 12:01

## 2019-12-15 RX ADMIN — Medication 81 MILLIGRAM(S): at 11:32

## 2019-12-15 RX ADMIN — Medication 2: at 18:04

## 2019-12-15 RX ADMIN — FINASTERIDE 5 MILLIGRAM(S): 5 TABLET, FILM COATED ORAL at 11:32

## 2019-12-15 RX ADMIN — Medication 5 MILLIGRAM(S): at 05:16

## 2019-12-15 RX ADMIN — Medication 1 TABLET(S): at 13:27

## 2019-12-15 RX ADMIN — Medication 7 UNIT(S): at 12:38

## 2019-12-15 RX ADMIN — INSULIN GLARGINE 10 UNIT(S): 100 INJECTION, SOLUTION SUBCUTANEOUS at 22:55

## 2019-12-15 RX ADMIN — Medication 1 TABLET(S): at 05:16

## 2019-12-15 RX ADMIN — Medication 2.5 MILLIGRAM(S): at 11:31

## 2019-12-15 RX ADMIN — TAMSULOSIN HYDROCHLORIDE 0.4 MILLIGRAM(S): 0.4 CAPSULE ORAL at 22:55

## 2019-12-15 RX ADMIN — Medication 1 TABLET(S): at 17:42

## 2019-12-15 RX ADMIN — Medication 2.5 MILLIGRAM(S): at 17:42

## 2019-12-15 RX ADMIN — Medication 5 MILLIGRAM(S): at 11:32

## 2019-12-15 RX ADMIN — Medication 1 TABLET(S): at 11:32

## 2019-12-15 RX ADMIN — Medication 2.5 MILLIGRAM(S): at 05:39

## 2019-12-15 RX ADMIN — ATORVASTATIN CALCIUM 10 MILLIGRAM(S): 80 TABLET, FILM COATED ORAL at 11:32

## 2019-12-15 RX ADMIN — CHLORHEXIDINE GLUCONATE 1 APPLICATION(S): 213 SOLUTION TOPICAL at 05:16

## 2019-12-15 RX ADMIN — RANOLAZINE 500 MILLIGRAM(S): 500 TABLET, FILM COATED, EXTENDED RELEASE ORAL at 17:42

## 2019-12-15 RX ADMIN — RANOLAZINE 500 MILLIGRAM(S): 500 TABLET, FILM COATED, EXTENDED RELEASE ORAL at 05:16

## 2019-12-15 RX ADMIN — LISINOPRIL 20 MILLIGRAM(S): 2.5 TABLET ORAL at 05:17

## 2019-12-15 RX ADMIN — Medication 40 MILLIGRAM(S): at 05:16

## 2019-12-15 RX ADMIN — Medication 7 UNIT(S): at 18:04

## 2019-12-15 RX ADMIN — SENNA PLUS 2 TABLET(S): 8.6 TABLET ORAL at 22:55

## 2019-12-15 RX ADMIN — ABIRATERONE ACETATE 1000 MILLIGRAM(S): 250 TABLET ORAL at 05:37

## 2019-12-15 RX ADMIN — Medication 3 MILLILITER(S): at 19:52

## 2019-12-15 NOTE — PROGRESS NOTE ADULT - SUBJECTIVE AND OBJECTIVE BOX
Called by housestaff for non draining palomo catheter. @0 F  palomo not draining urine, ballon deflateand palomo repositioned. Aspirated several small blood clots, urine now draining freely. Housestaff instructed to flush palomo with 60 cc sterile water q shift.

## 2019-12-15 NOTE — PROGRESS NOTE ADULT - SUBJECTIVE AND OBJECTIVE BOX
EMIGDIO RUBI  [facility] T2-3A 018 A ([facility] T2-3A)            Patient was evaluated and examined  by bedside, slightly wheezing today,  flashed palomo and repositioned balloon. Overall patient remains very weak, jaundiced, deconditioned.                 REVIEW OF SYSTEMS:  please see pertinent positives mentioned above, all other 12 ROS negative      T(C): , Max: 36.6 (12-14-19 @ 12:15)  HR: 96 (12-14-19 @ 21:00)  BP: 138/65 (12-15-19 @ 04:56)  RR: 18 (12-15-19 @ 04:56)  SpO2: --  CAPILLARY BLOOD GLUCOSE      POCT Blood Glucose.: 146 mg/dL (15 Dec 2019 07:36)  POCT Blood Glucose.: 190 mg/dL (14 Dec 2019 21:54)  POCT Blood Glucose.: 319 mg/dL (14 Dec 2019 16:50)  POCT Blood Glucose.: 267 mg/dL (14 Dec 2019 11:47)      PHYSICAL EXAM:  General: NAD, AAOX3, patient is laying comfortably in bed  HEENT: AT, NC, Supple, NO JVD, NO CB  Lungs: CTA B/L, mild b/l wheezing, no rhonchi  CVS: normal S1, S2, RRR, NO M/G/R  Abdomen:  bowel sounds present, tender in right quadrants, mildly-distended  : palomo draining yellow urine  Extremities: no edema, no clubbing, no cyanosis, positive peripheral pulses b/l  Neuro: no acute focal neurological deficits, diffuse body weakness  Skin: jaundiced skin      LAB  CBC  Date: 12-15-19 @ 05:45  Mean cell Vbxuncwkhf03.9  Mean cell Hemoglobin Conc32.3  Mean cell Volum 86.4  Platelet count-Automate 197  RBC Count 3.01  Red Cell Distrib Width18.4  WBC Count13.99  % Albumin, Urine--  Hematocrit 26.0  Hemoglobin 8.4  CBC  Date: 12-14-19 @ 18:14  Mean cell Jtriehwlyi87.5  Mean cell Hemoglobin Conc31.4  Mean cell Volum 87.6  Platelet count-Automate 196  RBC Count 2.91  Red Cell Distrib Width18.1  WBC Count12.67  % Albumin, Urine--  Hematocrit 25.5  Hemoglobin 8.0  CBC  Date: 12-14-19 @ 05:44  Mean cell Coslmsmyur87.1  Mean cell Hemoglobin Conc31.0  Mean cell Volum 87.5  Platelet count-Automate 197  RBC Count 2.95  Red Cell Distrib Width17.9  WBC Count14.44  % Albumin, Urine--  Hematocrit 25.8  Hemoglobin 8.0  CBC  Date: 12-13-19 @ 04:30  Mean cell Zeipsgnuae36.6  Mean cell Hemoglobin Conc32.2  Mean cell Volum 85.9  Platelet count-Automate 191  RBC Count 3.04  Red Cell Distrib Width17.3  WBC Count15.05  % Albumin, Urine--  Hematocrit 26.1  Hemoglobin 8.4  CBC  Date: 12-12-19 @ 06:18  Mean cell Qtreayxiqj76.8  Mean cell Hemoglobin Conc32.0  Mean cell Volum 86.8  Platelet count-Automate 184  RBC Count 2.88  Red Cell Distrib Width16.7  WBC Count14.64  % Albumin, Urine--  Hematocrit 25.0  Hemoglobin 8.0  CBC  Date: 12-11-19 @ 06:34  Mean cell Comorhqsiw10.0  Mean cell Hemoglobin Conc32.4  Mean cell Volum 86.5  Platelet count-Automate 202  RBC Count 2.75  Red Cell Distrib Width16.6  WBC Count12.49  % Albumin, Urine--  Hematocrit 23.8  Hemoglobin 7.7  CBC  Date: 12-10-19 @ 16:52  Mean cell Qnujxpxfdg40.3  Mean cell Hemoglobin Conc32.8  Mean cell Volum 86.4  Platelet count-Automate 241  RBC Count 2.86  Red Cell Distrib Width16.8  WBC Count13.86  % Albumin, Urine--  Hematocrit 24.7  Hemoglobin 8.1  CBC  Date: 12-10-19 @ 08:22  Mean cell Afigacxqrt36.1  Mean cell Hemoglobin Conc32.6  Mean cell Volum 86.1  Platelet count-Automate 227  RBC Count 2.74  Red Cell Distrib Width16.9  WBC Count12.46  % Albumin, Urine--  Hematocrit 23.6  Hemoglobin 7.7  CBC  Date: 12-09-19 @ 06:56  Mean cell Vnweirfdzd55.0  Mean cell Hemoglobin Conc31.8  Mean cell Volum 88.0  Platelet count-Automate 242  RBC Count 3.00  Red Cell Distrib Width16.5  WBC Count14.30  % Albumin, Urine--  Hematocrit 26.4  Hemoglobin 8.4  CBC  Date: 12-08-19 @ 19:50  Mean cell Kudsccavbx11.0  Mean cell Hemoglobin Conc32.4  Mean cell Volum 86.5  Platelet count-Automate 259  RBC Count 3.18  Red Cell Distrib Width16.1  WBC Count14.54  % Albumin, Urine--  Hematocrit 27.5  Hemoglobin 8.9    [facility]  12-15-19 @ 05:45  Blood Gas Arterial-Calcium,Ionized--  Blood Urea Nitrogen, Serum 21 mg/dL<H> [10 - 20]  Carbon Dioxide, Serum24 mmol/L [17 - 32]  Chloride, Serum99 mmol/L [98 - 110]  Creatinie, Serum1.0 mg/dL [0.7 - 1.5] [Icteric. Interpret with caution]  Glucose, Znnej297 mg/dL<H> [70 - 99]  Potassium, Serum3.6 mmol/L [3.5 - 5.0]  Sodium, Serum 140 mmol/L [135 - 146]  [facility]  12-14-19 @ 05:44  Blood Gas Arterial-Calcium,Ionized--  Blood Urea Nitrogen, Serum 15 mg/dL [10 - 20]  Carbon Dioxide, Serum25 mmol/L [17 - 32]  Chloride, Etwmm037 mmol/L [98 - 110]  Creatinie, Serum0.8 mg/dL [0.7 - 1.5] [Icteric. Interpret with caution]  Glucose, Qaeih038 mg/dL<H> [70 - 99]  Potassium, Serum3.3 mmol/L<L> [3.5 - 5.0]  Sodium, Serum 140 mmol/L [135 - 146]  [facility]  12-13-19 @ 04:30  Blood Gas Arterial-Calcium,Ionized--  Blood Urea Nitrogen, Serum 13 mg/dL [10 - 20]  Carbon Dioxide, Serum21 mmol/L [17 - 32]  Chloride, Dkjmp544 mmol/L [98 - 110]  Creatinie, Serum0.7 mg/dL [0.7 - 1.5] [Icteric. Interpret with caution]  Glucose, Udjzx654 mg/dL<H> [70 - 99]  Potassium, Serum3.4 mmol/L<L> [3.5 - 5.0]  Sodium, Serum 141 mmol/L [135 - 146]  [facility]  12-12-19 @ 06:18  Blood Gas Arterial-Calcium,Ionized--  Blood Urea Nitrogen, Serum 11 mg/dL [10 - 20]  Carbon Dioxide, Serum22 mmol/L [17 - 32]  Chloride, Serum99 mmol/L [98 - 110]  Creatinie, Serum0.7 mg/dL [0.7 - 1.5] [Icteric. Interpret with caution]  Glucose, Jpzup138 mg/dL<H> [70 - 99]  Potassium, Serum3.0 mmol/L<L> [3.5 - 5.0]  Sodium, Serum 138 mmol/L [135 - 146]  [facility]  12-11-19 @ 06:34  Blood Gas Arterial-Calcium,Ionized--  Blood Urea Nitrogen, Serum 12 mg/dL [10 - 20]  Carbon Dioxide, Serum21 mmol/L [17 - 32]  Chloride, Bcasv859 mmol/L [98 - 110]  Creatinie, Serum0.8 mg/dL [0.7 - 1.5] [Icteric. Interpret with caution]  Glucose, Vwwwm842 mg/dL<H> [70 - 99]  Potassium, Serum3.7 mmol/L [3.5 - 5.0]  Sodium, Serum 140 mmol/L [135 - 146]  [facility]  12-10-19 @ 16:52  Blood Gas Arterial-Calcium,Ionized--  Blood Urea Nitrogen, Serum 14 mg/dL [10 - 20]  Carbon Dioxide, Serum22 mmol/L [17 - 32]  Chloride, Vcccc593 mmol/L [98 - 110]  Creatinie, Serum0.8 mg/dL [0.7 - 1.5] [Icteric. Interpret with caution]  Glucose, Nkbvp481 mg/dL<H> [70 - 99]  Potassium, Serum3.4 mmol/L<L> [3.5 - 5.0]  Sodium, Serum 138 mmol/L [135 - 146]        PT/INR - ( 14 Dec 2019 18:14 )   PT: 20.20 sec;   INR: 1.77 ratio               Microbiology:    Culture - Urine (collected 12-08-19 @ 22:10)  Source: .Urine Clean Catch (Midstream)  Final Report (12-10-19 @ 12:25):    >=3 organisms. Probable collection contamination.    Culture - Blood (collected 12-08-19 @ 20:45)  Source: .Blood Blood-Peripheral  Final Report (12-14-19 @ 08:00):    No growth at 5 days.    Culture - Blood (collected 12-08-19 @ 19:50)  Source: .Blood Blood-Peripheral  Final Report (12-14-19 @ 08:00):    No growth at 5 days.          Medications:  abiraterone 1000 milliGRAM(s) Oral daily  albuterol/ipratropium for Nebulization 3 milliLiter(s) Nebulizer every 6 hours PRN  amoxicillin  875 milliGRAM(s)/clavulanate 1 Tablet(s) Oral two times a day  aspirin enteric coated 81 milliGRAM(s) Oral daily  atorvastatin Oral Tab/Cap - Peds 10 milliGRAM(s) Oral daily  chlorhexidine 4% Liquid 1 Application(s) Topical <User Schedule>  dronabinol 2.5 milliGRAM(s) Oral three times a day before meals  finasteride 5 milliGRAM(s) Oral daily  furosemide    Tablet 40 milliGRAM(s) Oral daily  insulin glargine Injectable (LANTUS) 10 Unit(s) SubCutaneous at bedtime  insulin lispro (HumaLOG) corrective regimen sliding scale   SubCutaneous three times a day before meals  lactulose Syrup 20 Gram(s) Oral daily  lisinopril 20 milliGRAM(s) Oral daily  morphine  - Injectable 4 milliGRAM(s) IV Push every 6 hours PRN  multivitamin 1 Tablet(s) Oral daily  oxybutynin 5 milliGRAM(s) Oral daily  oxyCODONE    IR 10 milliGRAM(s) Oral every 4 hours PRN  polyethylene glycol 3350 17 Gram(s) Oral daily  predniSONE   Tablet 5 milliGRAM(s) Oral daily  ranolazine 500 milliGRAM(s) Oral two times a day  senna 2 Tablet(s) Oral at bedtime  tamsulosin Oral Tab/Cap - Peds 0.4 milliGRAM(s) Oral at bedtime        Assessment and Plan:  - Progressive stage 4 urothelial carcinoma with metastatic disease to lungs, liver with very poor prognosis,  continue palliative hormonal tx.    - Chronic indwelling palomo due to malignancy, occasional blockage with blood clots and leakage, needs to be flashed every 8 hours.  - Acute transaminitis due to metastatic liver disease- MRCP- no biliary system obstruction, continue supportive tx.   -Acute Hypoxic respiratory failure - clinically has improved post lasix tx,  nebs, Oxygen tx. prn, transitioned to po lasix tx.  - Complicated UTI - IV abx tx. -  switched to PO abx.   -Hypomangesemia, Hypokalemia - supplemented  -Malnutrition - continue supportive tx.   -Diarrhea- started on probiotics tx.    Patient's overall prognosis very poor    #Progress Note Handoff: continue abx tx. , d/c planning to SNF with transition to hospice at SNF  Family discussion: medical problems and plan was d/w patient and patient's daughter  by bedside Disposition: SNF once bed available .

## 2019-12-16 ENCOUNTER — TRANSCRIPTION ENCOUNTER (OUTPATIENT)
Age: 74
End: 2019-12-16

## 2019-12-16 VITALS
HEART RATE: 101 BPM | RESPIRATION RATE: 18 BRPM | SYSTOLIC BLOOD PRESSURE: 99 MMHG | DIASTOLIC BLOOD PRESSURE: 54 MMHG | TEMPERATURE: 96 F

## 2019-12-16 LAB
ALBUMIN SERPL ELPH-MCNC: 2 G/DL — LOW (ref 3.5–5.2)
ALP SERPL-CCNC: 325 U/L — HIGH (ref 30–115)
ALT FLD-CCNC: 134 U/L — HIGH (ref 0–41)
ANION GAP SERPL CALC-SCNC: 21 MMOL/L — HIGH (ref 7–14)
AST SERPL-CCNC: 445 U/L — HIGH (ref 0–41)
BASOPHILS # BLD AUTO: 0.02 K/UL — SIGNIFICANT CHANGE UP (ref 0–0.2)
BASOPHILS NFR BLD AUTO: 0.1 % — SIGNIFICANT CHANGE UP (ref 0–1)
BILIRUB SERPL-MCNC: 10.1 MG/DL — HIGH (ref 0.2–1.2)
BUN SERPL-MCNC: 27 MG/DL — HIGH (ref 10–20)
CALCIUM SERPL-MCNC: 7.9 MG/DL — LOW (ref 8.5–10.1)
CHLORIDE SERPL-SCNC: 96 MMOL/L — LOW (ref 98–110)
CO2 SERPL-SCNC: 20 MMOL/L — SIGNIFICANT CHANGE UP (ref 17–32)
CREAT SERPL-MCNC: 1.1 MG/DL — SIGNIFICANT CHANGE UP (ref 0.7–1.5)
EOSINOPHIL # BLD AUTO: 0.02 K/UL — SIGNIFICANT CHANGE UP (ref 0–0.7)
EOSINOPHIL NFR BLD AUTO: 0.1 % — SIGNIFICANT CHANGE UP (ref 0–8)
GLUCOSE BLDC GLUCOMTR-MCNC: 169 MG/DL — HIGH (ref 70–99)
GLUCOSE BLDC GLUCOMTR-MCNC: 219 MG/DL — HIGH (ref 70–99)
GLUCOSE BLDC GLUCOMTR-MCNC: 290 MG/DL — HIGH (ref 70–99)
GLUCOSE SERPL-MCNC: 196 MG/DL — HIGH (ref 70–99)
HCT VFR BLD CALC: 22.5 % — LOW (ref 42–52)
HGB BLD-MCNC: 7.5 G/DL — LOW (ref 14–18)
IMM GRANULOCYTES NFR BLD AUTO: 2.2 % — HIGH (ref 0.1–0.3)
LYMPHOCYTES # BLD AUTO: 1.53 K/UL — SIGNIFICANT CHANGE UP (ref 1.2–3.4)
LYMPHOCYTES # BLD AUTO: 11.4 % — LOW (ref 20.5–51.1)
MAGNESIUM SERPL-MCNC: 1.8 MG/DL — SIGNIFICANT CHANGE UP (ref 1.8–2.4)
MCHC RBC-ENTMCNC: 28 PG — SIGNIFICANT CHANGE UP (ref 27–31)
MCHC RBC-ENTMCNC: 33.3 G/DL — SIGNIFICANT CHANGE UP (ref 32–37)
MCV RBC AUTO: 84 FL — SIGNIFICANT CHANGE UP (ref 80–94)
MONOCYTES # BLD AUTO: 1.31 K/UL — HIGH (ref 0.1–0.6)
MONOCYTES NFR BLD AUTO: 9.7 % — HIGH (ref 1.7–9.3)
NEUTROPHILS # BLD AUTO: 10.27 K/UL — HIGH (ref 1.4–6.5)
NEUTROPHILS NFR BLD AUTO: 76.5 % — HIGH (ref 42.2–75.2)
NRBC # BLD: 0 /100 WBCS — SIGNIFICANT CHANGE UP (ref 0–0)
PLATELET # BLD AUTO: 189 K/UL — SIGNIFICANT CHANGE UP (ref 130–400)
POTASSIUM SERPL-MCNC: 3.3 MMOL/L — LOW (ref 3.5–5)
POTASSIUM SERPL-SCNC: 3.3 MMOL/L — LOW (ref 3.5–5)
PROT SERPL-MCNC: 4.9 G/DL — LOW (ref 6–8)
RBC # BLD: 2.68 M/UL — LOW (ref 4.7–6.1)
RBC # FLD: 18.6 % — HIGH (ref 11.5–14.5)
SODIUM SERPL-SCNC: 137 MMOL/L — SIGNIFICANT CHANGE UP (ref 135–146)
WBC # BLD: 13.44 K/UL — HIGH (ref 4.8–10.8)
WBC # FLD AUTO: 13.44 K/UL — HIGH (ref 4.8–10.8)

## 2019-12-16 PROCEDURE — 99239 HOSP IP/OBS DSCHRG MGMT >30: CPT

## 2019-12-16 RX ORDER — INSULIN LISPRO 100/ML
VIAL (ML) SUBCUTANEOUS
Refills: 0 | Status: DISCONTINUED | OUTPATIENT
Start: 2019-12-16 | End: 2019-12-16

## 2019-12-16 RX ORDER — POTASSIUM CHLORIDE 20 MEQ
20 PACKET (EA) ORAL
Refills: 0 | Status: COMPLETED | OUTPATIENT
Start: 2019-12-16 | End: 2019-12-16

## 2019-12-16 RX ORDER — INSULIN LISPRO 100/ML
10 VIAL (ML) SUBCUTANEOUS
Refills: 0 | Status: DISCONTINUED | OUTPATIENT
Start: 2019-12-16 | End: 2019-12-16

## 2019-12-16 RX ADMIN — Medication 50 MILLIEQUIVALENT(S): at 18:41

## 2019-12-16 RX ADMIN — Medication 40 MILLIGRAM(S): at 05:14

## 2019-12-16 RX ADMIN — Medication 5 MILLIGRAM(S): at 05:15

## 2019-12-16 RX ADMIN — Medication 7 UNIT(S): at 11:27

## 2019-12-16 RX ADMIN — Medication 2: at 08:56

## 2019-12-16 RX ADMIN — LISINOPRIL 20 MILLIGRAM(S): 2.5 TABLET ORAL at 05:15

## 2019-12-16 RX ADMIN — Medication 2.5 MILLIGRAM(S): at 11:25

## 2019-12-16 RX ADMIN — Medication 50 MILLIEQUIVALENT(S): at 15:35

## 2019-12-16 RX ADMIN — Medication 81 MILLIGRAM(S): at 11:25

## 2019-12-16 RX ADMIN — ABIRATERONE ACETATE 1000 MILLIGRAM(S): 250 TABLET ORAL at 05:19

## 2019-12-16 RX ADMIN — Medication 2.5 MILLIGRAM(S): at 05:16

## 2019-12-16 RX ADMIN — Medication 7 UNIT(S): at 08:55

## 2019-12-16 RX ADMIN — Medication 1 TABLET(S): at 13:26

## 2019-12-16 RX ADMIN — Medication 1 TABLET(S): at 05:15

## 2019-12-16 RX ADMIN — Medication 5 MILLIGRAM(S): at 11:26

## 2019-12-16 RX ADMIN — Medication 10 UNIT(S): at 17:27

## 2019-12-16 RX ADMIN — Medication 2: at 17:27

## 2019-12-16 RX ADMIN — Medication 1 TABLET(S): at 18:41

## 2019-12-16 RX ADMIN — FINASTERIDE 5 MILLIGRAM(S): 5 TABLET, FILM COATED ORAL at 11:26

## 2019-12-16 RX ADMIN — RANOLAZINE 500 MILLIGRAM(S): 500 TABLET, FILM COATED, EXTENDED RELEASE ORAL at 05:15

## 2019-12-16 RX ADMIN — Medication 2.5 MILLIGRAM(S): at 17:27

## 2019-12-16 RX ADMIN — ATORVASTATIN CALCIUM 10 MILLIGRAM(S): 80 TABLET, FILM COATED ORAL at 11:26

## 2019-12-16 RX ADMIN — Medication 1 TABLET(S): at 11:26

## 2019-12-16 RX ADMIN — CHLORHEXIDINE GLUCONATE 1 APPLICATION(S): 213 SOLUTION TOPICAL at 05:15

## 2019-12-16 RX ADMIN — Medication 50 MILLIEQUIVALENT(S): at 11:25

## 2019-12-16 RX ADMIN — RANOLAZINE 500 MILLIGRAM(S): 500 TABLET, FILM COATED, EXTENDED RELEASE ORAL at 17:28

## 2019-12-16 RX ADMIN — Medication 3: at 11:27

## 2019-12-16 NOTE — PROGRESS NOTE ADULT - ASSESSMENT
Mr. Milton is a 73 yo male patient with PMH of BPH, HTN, DM, CAD, high grade urothelial carcinoma/metastatic to liver and lungs (following at [facility]) comes to the ED for Abdominal pain and fever for 3 days duration.    #Acute complicated UTI in the setting of high grade urothelial carcinoma and chronic palomo catheter  - Positive UA  - BCx 12/08 NGTD  - UCx 11/08 <49k E fecalis  - Per Urology, palomo leaking likely due to bladder spasm, primary solution would be antispasmodics, but not recommended due to side effects  - Per ID, continue PO Augmentin 875mg BID for 7 days (12/11-12/18)    #Dyspnea  - Continue Duonebs q6H PRN and Lasix 40mg PO Daily    #Jaundice (High direct bilirubin)  - Patient with RUQ tenderness  - Known mets to the liver, unclear if getting worse. PET scan done last week (no results as per daughter, she will try to bring the results)  - CT scan + RUQ US: cholelithiasis, no cholecystitis, no CBD dilatation  - HIDA scan: no cholecystitis  - MRCP: Cholelithiasis; No choledocholithiasis; No biliary ductal dilation. Hepatic/Lung metastases.    #Malnutrition, hypoalbuminemia severe hypokalemia  - Per nutrition, added Prosource and Ensure to diet  - Monitor, replete as needed  - Continue Dronabinol 2.5mg TID with meals    #High grade urothelial carcinoma with metastasis to liver and lung  - Follows with Dr. Hook ([phone]) at [facility]  - Patient had recent PET/CT, follow up results  - Continue Zytiga  - Continue Prednisone 5mg Daily    #Prolonged QTc  - QTc >700 on admission, likely due to hypokalemia and hypomagnesemia  - Down to ~500, telemetry discontinued     #DM  - Continue Lantus 10 qHS, Sliding scale    #CAD  - Continue home meds    #HTN  - Continue Lisinopril 20mg daily    #Pending: SNF bed availability  #Diet: Clear Liquids  #GI Prophylaxis: Protonix 40mg PO Daily  #DVT Prophylaxis: Holding Lovenox for hematuria  #Activity: Ambulate as tolerated  #Code Status: DNR/DNI

## 2019-12-16 NOTE — PROGRESS NOTE ADULT - REASON FOR ADMISSION
Abdominal pain/UTI
Statement Selected

## 2019-12-16 NOTE — PROGRESS NOTE ADULT - SUBJECTIVE AND OBJECTIVE BOX
Patient Information:  EMIGDIO RUBI / 74y / Male / MRN#:[id number]    Hospital Day: 7d    HPI:  73 yo male with PMH of BPH, HTN, DM, CAD, high grade urothelial carcinoma/metastatic comes to the ED for Abdominal pain and fever for 3 days duration.  pain in lower abdomen, sharp, 4/10, aggravates on straining or pressing lower abdomen.  Fever subjective, low grade, associated with nausea, 1 episode non billious non bloody vomiting, severe loss of appetite, and severe generalized weakness.  patient also complains of dry cough worse on deep inspiration.  No headache, photophobia, chest pain, vertigo, LE edema.    patient has been recently diagnosed with High grade urothelial carcinoma taking chemo pills 2 pills per day for last 2 months.  follows up with Dr Hook at [facility].; recently had PET/CT done; has an appointment there tomorrow.    In the ED, patient Tachycardic, severe generalized weakness, prolonger QT, severe hypokalemia.    Interval History:  Patient seen and examined at bedside. Patient had diarrhea overnight, discontinued bowel regimen.    Past Medical History:  Bladder cancer  CAD, multiple vessel  Anemia  PVD (peripheral vascular disease)  Diabetes mellitus  Hypertension  Prostate CA    Past Surgical History:  S/P coronary artery stent placement  H/O hernia repair  No significant past surgical history    Allergies:  No Known Allergies    Medications:  PRN:  ipratropium    for Nebulization 500 MICROGram(s) Nebulizer every 6 hours PRN Shortness of Breath and/or Wheezing  morphine  - Injectable 4 milliGRAM(s) IV Push every 6 hours PRN Severe Pain (7 - 10)  oxyCODONE    IR 10 milliGRAM(s) Oral every 4 hours PRN Moderate Pain (4 - 6)    Standing:  abiraterone 1000 milliGRAM(s) Oral daily  amoxicillin  875 milliGRAM(s)/clavulanate 1 Tablet(s) Oral two times a day  aspirin enteric coated 81 milliGRAM(s) Oral daily  atorvastatin Oral Tab/Cap - Peds 10 milliGRAM(s) Oral daily  chlorhexidine 4% Liquid 1 Application(s) Topical <User Schedule>  dronabinol 2.5 milliGRAM(s) Oral three times a day before meals  finasteride 5 milliGRAM(s) Oral daily  furosemide    Tablet 40 milliGRAM(s) Oral daily  insulin glargine Injectable (LANTUS) 10 Unit(s) SubCutaneous at bedtime  insulin lispro (HumaLOG) corrective regimen sliding scale   SubCutaneous three times a day before meals  insulin lispro Injectable (HumaLOG) 7 Unit(s) SubCutaneous three times a day before meals  lactobacillus acidophilus 1 Tablet(s) Oral three times a day  lisinopril 20 milliGRAM(s) Oral daily  multivitamin 1 Tablet(s) Oral daily  oxybutynin 5 milliGRAM(s) Oral daily  potassium chloride  20 mEq/100 mL IVPB 20 milliEquivalent(s) IV Intermittent every 2 hours  predniSONE   Tablet 5 milliGRAM(s) Oral daily  ranolazine 500 milliGRAM(s) Oral two times a day  tamsulosin Oral Tab/Cap - Peds 0.4 milliGRAM(s) Oral at bedtime    Home:  abiraterone 250 mg oral tablet: 4 tab(s) orally once a day  acetaminophen 325 mg oral tablet: 2 tab(s) orally once, As needed, Mild Pain (1 - 3)  amoxicillin-clavulanate 875 mg-125 mg oral tablet: 1 tab(s) orally 2 times a day until Dec 18th  aspirin 81 mg oral delayed release tablet: 1 tab(s) orally once a day  dronabinol 2.5 mg oral capsule: 1 cap(s) orally 3 times a day (before meals)  finasteride 5 mg oral tablet: 1 tab(s) orally once a day  furosemide 40 mg oral tablet: 1 tab(s) orally once a day  ipratropium-albuterol 0.5 mg-2.5 mg/3 mLinhalation solution: 3 milliliter(s) inhaled every 6 hours, As needed, Shortness of Breath and/or Wheezing  lactulose 10 g/15 mL oral syrup: 30 milliliter(s) orally once a day  lisinopril 20 mg oral tablet: 1 tab(s) orally once a day  Multiple Vitamins oral tablet: 1 tab(s) orally once a day  oxyCODONE 10 mg oral tablet: 1 tab(s) orally every 4 hours, As needed, Moderate Pain (4 - 6)  predniSONE 5 mg oral tablet: 1 tab(s) orally once a day  Ranexa 500 mg oral tablet, extended release: 1 tab(s) orally 2 times a day  senna oral tablet: 2 tab(s) orally once a day (at bedtime)    Vitals:  T(C): 36.6, Max: 37.1 (12-15-19 @ 21:31)  T(F): 97.9, Max: 98.8 (12-15-19 @ 21:31)  HR: 105 (94 - 107)  BP: 114/59 (114/59 - 134/64)  RR: 22 (18 - 22)  SpO2: 97% (90% - 97%)    Physical Exam:  General: Awake, Alert. Jaundiced  Heart: Regular rate and rhythm; S1, S2; No murmurs.  Lungs: Clear to auscultation bilaterally.  Abdomen: Soft, RUQ/RLQ tender, nondistended.  Extremities: No edema in upper or lower extremities.  Neuro: AAOx3, No focal deficits.    Labs:  CBC (12-16 @ 06:54)                        Hgb: 7.5    WBC: 13.44 )-----------------( Plts: 189                              Hct: 22.5     Chem (12-16 @ 06:54)  Na: 137  |     Cl: 96     |  BUN: 27  -----------------------------------------< Gluc: 196    K: 3.3   |    HCO3: 20  |  Cr: 1.1    Ca 7.9 (12-16 @ 06:54)  Mg 1.8 (12-16 @ 06:54)    LFTs (12-16 @ 06:54)  TPro 4.9  /  Alb 2.0  TBili 10.1  /  DBili       /    /  AlkPhos 325    PT/INR (12-14 @ 18:14)  PT: 20.20; INR: 1.77   PTT:

## 2019-12-16 NOTE — DISCHARGE NOTE NURSING/CASE MANAGEMENT/SOCIAL WORK - PATIENT PORTAL LINK FT
You can access the FollowMyHealth Patient Portal offered by [facility] by registering at the following website: [email or web address]. By joining [employer]’s FollowMyHealth portal, you will also be able to view your health information using other applications (apps) compatible with our system.

## 2019-12-16 NOTE — DISCHARGE NOTE NURSING/CASE MANAGEMENT/SOCIAL WORK - MODE OF TRANSPORTATION
Admission medication history interview status for this patient is complete. See [facility] admission navigator for allergy information, prior to admission medications and immunization status.     Medication history interview source(s):Patient  Medication history resources (including written lists, pill bottles, clinic record):None  Primary pharmacy:    Changes made to PTA medication list:  Added:   Deleted: Norco prn, zofran prn, tamsulosin 0.4 mg daily.  Changed:     Actions taken by pharmacist (provider contacted, etc):None     Additional medication history information:None    Medication reconciliation/reorder completed by provider prior to medication history? Yes        Prior to Admission medications    Not on File            Ambulance

## 2019-12-16 NOTE — CHART NOTE - NSCHARTNOTEFT_GEN_A_CORE
Registered Dietitian Follow-Up     Patient Profile Reviewed                           Yes [x]   No []     Nutrition History Previously Obtained        Yes [x]  No []       Pertinent Subjective Information: Pt. noted on dysphagia 2 mech soft honey consistency fluids, no SLP consult or notes noted in EMR. Recommended supplements have been ordered and pt. tolerating them per wife. Wife states she's been watering down Ensure clear d/t higher sugar content. Since diet advanced from clear liquid will rec Glucerna instead.      Pertinent Medical Interventions: Acute complicated UTI in the setting of high grade urothelial carcinoma and chronic palomo catheter, ID: on abx, urology following. Jaundice (High direct bilirubin)- known mets to liver. - MRCP: Cholelithiasis; No choledocholithiasis; No biliary ductal dilation. Hepatic/Lung metastases. #High grade urothelial carcinoma with metastasis to liver and lung- f/u at [facility]. DM: insulin sliding scale. DNR/DNI.          Diet order: dysphagia 2 mech soft honey consistency fluid, Prosource gelatein SF TID, Ensure clear BID     Anthropometrics:  - Ht. 182.9cm  - Wt. no new weights documented   - %wt change  - BMI 27.7  - IBW     Pertinent Lab Data: (12/16) WBC 13.44, RBC 2.68, Hg 7.5, Hct 22.5, K 3.3, Cl 96, BUN 27, glu 196, , , bilirubin 10.1     Pertinent Meds: Lispro, Lactobacillus, Marinol, Lasix, Atorvastatin, Prednisone, Ranexa      Physical Findings:  - Appearance: somewhat drowsy during visit  - GI function: diarrhea, bowel regimen discontinued   - Tubes: none noted  - Oral/Mouth cavity: no symptoms noted  - Skin: intact (BS 16)      Nutrition Requirements (from RD note on 12/12)  Weight Used: 92.6kg     Estimated Energy Needs    Continue [x]  Adjust [] 2040-2210kcal (MSJ x 1.2-1.3 AF) for elderly, overweight pt. with metastatic CA  Adjusted Energy Recommendations:   kcal/day        Estimated Protein Needs    Continue [x]  Adjust []  92-110g (1-1.2g/kg CBW)  Adjusted Protein Recommendations:   gm/day        Estimated Fluid Needs        Continue [x]  Adjust [] 1ml/kcal or per LIP  Adjusted Fluid Recommendations:   mL/day     Nutrient Intake: ~25-50% PO at meals         [] Previous Nutrition Diagnosis:  Inadequate Oral Intake.            [x] Ongoing          [] Resolved       Nutrition Intervention: meals and snacks, coordination of care, nutrition related medication management     Rec: Consult SLP for the most appropriate diet texture/consistency, continue DASH/TLC add consistent carb w/snack modifier, Discontinue Ensure clear and add Glucerna BID, continue Prosource gelatein SF TID. Continue Marinol daily.      Goal/Expected Outcome: In 3 days pt. to consume >50% PO and supplement      Indicator/Monitoring: diet order, energy intake, body composition, NFPF, liver panel, glucose profile

## 2019-12-16 NOTE — PROGRESS NOTE ADULT - ATTENDING COMMENTS
pt seen and examined independently  pt reports feeling weak  ++jaundice    - Progressive stage 4 urothelial carcinoma with metastatic disease to lungs, liver with very poor prognosis,  continue palliative hormonal tx.    - Chronic indwelling palomo due to malignancy, occasional blockage with blood clots and leakage, needs to be flushed every 8 hours.  - Acute transaminitis due to metastatic liver disease- MRCP- no biliary system obstruction, continue supportive tx.   - Acute Hypoxic respiratory failure from fluid overload - clinically has improved post lasix tx,  nebs, Oxygen tx. prn, transitioned to po lasix tx.  - Complicated UTI - IV abx tx. -  switched to PO augmentin  - Hypomangesemia, Hypokalemia - replete PRN  - Malnutrition - continue supportive tx.   - Diarrhea- started on probiotics tx.    Patient's overall prognosis very poor    Progress Note Handoff  Pending:  rehab placement  Patient/Family discussion: discussed plan w/ pt and wife at bedside  Disposition: medically stable for discharge to [facility] when bed available w/ transition to hospice care

## 2019-12-18 ENCOUNTER — APPOINTMENT (OUTPATIENT)
Dept: UROLOGY | Facility: CLINIC | Age: 74
End: 2019-12-18

## 2019-12-18 ENCOUNTER — OUTPATIENT (OUTPATIENT)
Dept: OUTPATIENT SERVICES | Facility: HOSPITAL | Age: 74
LOS: 1 days | Discharge: HOME | End: 2019-12-18

## 2019-12-18 DIAGNOSIS — I24.9 ACUTE ISCHEMIC HEART DISEASE, UNSPECIFIED: ICD-10-CM

## 2019-12-18 DIAGNOSIS — C67.3 MALIGNANT NEOPLASM OF ANTERIOR WALL OF BLADDER: ICD-10-CM

## 2019-12-18 DIAGNOSIS — N18.3 CHRONIC KIDNEY DISEASE, STAGE 3 (MODERATE): ICD-10-CM

## 2019-12-18 DIAGNOSIS — Z98.890 OTHER SPECIFIED POSTPROCEDURAL STATES: Chronic | ICD-10-CM

## 2019-12-18 DIAGNOSIS — Z95.5 PRESENCE OF CORONARY ANGIOPLASTY IMPLANT AND GRAFT: Chronic | ICD-10-CM

## 2019-12-19 DIAGNOSIS — Z85.46 PERSONAL HISTORY OF MALIGNANT NEOPLASM OF PROSTATE: ICD-10-CM

## 2019-12-19 DIAGNOSIS — E11.51 TYPE 2 DIABETES MELLITUS WITH DIABETIC PERIPHERAL ANGIOPATHY WITHOUT GANGRENE: ICD-10-CM

## 2019-12-19 DIAGNOSIS — C67.9 MALIGNANT NEOPLASM OF BLADDER, UNSPECIFIED: ICD-10-CM

## 2019-12-19 DIAGNOSIS — E87.6 HYPOKALEMIA: ICD-10-CM

## 2019-12-19 DIAGNOSIS — Z66 DO NOT RESUSCITATE: ICD-10-CM

## 2019-12-19 DIAGNOSIS — E88.09 OTHER DISORDERS OF PLASMA-PROTEIN METABOLISM, NOT ELSEWHERE CLASSIFIED: ICD-10-CM

## 2019-12-19 DIAGNOSIS — R33.8 OTHER RETENTION OF URINE: ICD-10-CM

## 2019-12-19 DIAGNOSIS — Z92.3 PERSONAL HISTORY OF IRRADIATION: ICD-10-CM

## 2019-12-19 DIAGNOSIS — E78.5 HYPERLIPIDEMIA, UNSPECIFIED: ICD-10-CM

## 2019-12-19 DIAGNOSIS — D63.0 ANEMIA IN NEOPLASTIC DISEASE: ICD-10-CM

## 2019-12-19 DIAGNOSIS — N18.3 CHRONIC KIDNEY DISEASE, STAGE 3 (MODERATE): ICD-10-CM

## 2019-12-19 DIAGNOSIS — N30.01 ACUTE CYSTITIS WITH HEMATURIA: ICD-10-CM

## 2019-12-19 DIAGNOSIS — T83.511A INFECTION AND INFLAMMATORY REACTION DUE TO INDWELLING URETHRAL CATHETER, INITIAL ENCOUNTER: ICD-10-CM

## 2019-12-19 DIAGNOSIS — C78.7 SECONDARY MALIGNANT NEOPLASM OF LIVER AND INTRAHEPATIC BILE DUCT: ICD-10-CM

## 2019-12-19 DIAGNOSIS — I25.10 ATHEROSCLEROTIC HEART DISEASE OF NATIVE CORONARY ARTERY WITHOUT ANGINA PECTORIS: ICD-10-CM

## 2019-12-19 DIAGNOSIS — C78.02 SECONDARY MALIGNANT NEOPLASM OF LEFT LUNG: ICD-10-CM

## 2019-12-19 DIAGNOSIS — I12.9 HYPERTENSIVE CHRONIC KIDNEY DISEASE WITH STAGE 1 THROUGH STAGE 4 CHRONIC KIDNEY DISEASE, OR UNSPECIFIED CHRONIC KIDNEY DISEASE: ICD-10-CM

## 2019-12-19 DIAGNOSIS — I45.81 LONG QT SYNDROME: ICD-10-CM

## 2019-12-19 DIAGNOSIS — Z95.5 PRESENCE OF CORONARY ANGIOPLASTY IMPLANT AND GRAFT: ICD-10-CM

## 2019-12-19 DIAGNOSIS — Z79.2 LONG TERM (CURRENT) USE OF ANTIBIOTICS: ICD-10-CM

## 2019-12-19 DIAGNOSIS — Z79.899 OTHER LONG TERM (CURRENT) DRUG THERAPY: ICD-10-CM

## 2019-12-19 DIAGNOSIS — Z98.890 OTHER SPECIFIED POSTPROCEDURAL STATES: ICD-10-CM

## 2019-12-19 DIAGNOSIS — A41.9 SEPSIS, UNSPECIFIED ORGANISM: ICD-10-CM

## 2019-12-19 DIAGNOSIS — Z96.0 PRESENCE OF UROGENITAL IMPLANTS: ICD-10-CM

## 2019-12-19 DIAGNOSIS — C78.01 SECONDARY MALIGNANT NEOPLASM OF RIGHT LUNG: ICD-10-CM

## 2019-12-19 DIAGNOSIS — Z79.84 LONG TERM (CURRENT) USE OF ORAL HYPOGLYCEMIC DRUGS: ICD-10-CM

## 2019-12-19 DIAGNOSIS — E11.22 TYPE 2 DIABETES MELLITUS WITH DIABETIC CHRONIC KIDNEY DISEASE: ICD-10-CM

## 2019-12-19 DIAGNOSIS — J96.01 ACUTE RESPIRATORY FAILURE WITH HYPOXIA: ICD-10-CM

## 2019-12-19 DIAGNOSIS — E46 UNSPECIFIED PROTEIN-CALORIE MALNUTRITION: ICD-10-CM

## 2019-12-31 PROBLEM — R31.9 HEMATURIA: Status: ACTIVE | Noted: 2019-10-09

## 2020-01-24 NOTE — PATIENT PROFILE ADULT - DO YOU FEEL UNSAFE AT WORK?
Referred by: Mauricio Arenas MD; Medical Diagnosis (from order):    Diagnosis Information      Diagnosis    719.46 (ICD-9-CM) - M25.562 (ICD-10-CM) - Acute pain of left knee                Physical Therapy -  Daily Treatment Note    Visit:  4     SUBJECTIVE                                                                                                             Reports did a 8.75 run this am and pain overall was less than before. Could walk down the stairs better this time compared to last time had to stiff leg walk down stairs. Soreness has since gone away.      Pain / Symptoms:  Pain rating (out of 10): Current: 0     OBJECTIVE                                                                                                                          TREATMENT                                                                                                                  Therapeutic Exercise:  Education on HEP, grades, changing directions with course, mechanics  Manual Therapy:  Use of instrument assisted gua sha tools for DTM to left ITB into distal ITB and hamstring tendon    Skilled input: verbal instruction/cues    Home Exercise Program: (*above indicates provided as part of home exercise program)  ITB, quad, HS stretching  Foam roller  Mini bound working on hip control      ASSESSMENT                                                                                                                 Tissue response distal posterior ITB to gua sha. Overall pt reports improvement in sxs adding in manual and stretching.     Pain/symptoms after session: 0  Patient Education:   Results of above outlined education: Verbalizes understanding and Demonstrates understanding   PLAN                                                                                                                             Suggestions for next session as indicated: Assess effects of manual       Procedures and total treatment time documented  Time Entry flowsheet.     no

## 2020-02-12 NOTE — ED ADULT NURSE NOTE - CAS TRG GEN SKIN COLOR
Left voice message for patient to return call to schedule appointment from referral to Podiatry department.  Marcia KINGSTON [phone]     Normal for race

## 2020-02-18 NOTE — PATIENT PROFILE ADULT - PACKS YRS CALCULATION
Quality 130: Documentation Of Current Medications In The Medical Record: Current Medications Documented Detail Level: Detailed 28

## 2020-04-22 ENCOUNTER — APPOINTMENT (OUTPATIENT)
Dept: CARDIOLOGY | Facility: CLINIC | Age: 75
End: 2020-04-22

## 2020-10-08 NOTE — ED PROVIDER NOTE - EKG DATE/TIME
Pt did his annual wellness visit on 10/1. Please advise if you want him to follow up with you and have labs done at that time. 08-Dec-2019 23:29

## 2021-03-10 NOTE — H&P ADULT - NSHPPHYSICALEXAM_GEN_ALL_CORE
PHYSICAL EXAM:  GENERAL: NAD, well-developed  HEAD:  Atraumatic, Normocephalic  EYES: EOMI, PERRLA, conjunctiva and sclera clear  NECK: Supple, No JVD  CHEST/LUNG: Clear to auscultation bilaterally; No wheeze  HEART: Regular rate and rhythm; No murmurs, rubs, or gallops  ABDOMEN: Soft, Nontender, Nondistended; Bowel sounds present  EXTREMITIES:  2+ Peripheral Pulses, No clubbing, cyanosis, or edema  PSYCH: AAOx3  NEUROLOGY: non-focal  SKIN: No rashes or lesions PHYSICAL EXAM:  GENERAL: NAD, well-developed  HEAD:  Atraumatic, Normocephalic  EYES: EOMI, PERRLA, conjunctiva and sclera clear  NECK: Supple, No JVD  CHEST/LUNG: Clear to auscultation bilaterally; No wheeze  HEART: Regular rate and rhythm; No murmurs, rubs, or gallops  ABDOMEN: Soft, Nondistended; Bowel sounds present, tenderness in lower abdomen+. rebound-  EXTREMITIES:  2+ Peripheral Pulses, No clubbing, cyanosis, or edema  PSYCH: AAOx3  NEUROLOGY: non-focal  SKIN: No rashes or lesions detailed exam

## 2021-06-29 NOTE — ASU DISCHARGE PLAN (ADULT/PEDIATRIC) - DIET/FLUID RESTRICTION
Ongoing SW/CM Assessment/Plan of Care Note     See SW/CM flowsheets for goals and other objective data.    Patient/Family discharge goal (s):  Goal #1: Communication facilitated  Goal #2: Home discharge arranged  Goal #3: Transportation arranged or issues addressed    PT Recommendation:  Recommendation for Discharge: PT WI: Post acute therapy, Sub-acute nursing home       OT Recommendation:  Recommendations for Discharge: OT WI: Post acute therapy, Other (comment) (pending medical status)       SLP Recommendation:  Recommendations for Discharge: SLP: None    Disposition:  Planned Discharge Destination: Rehabilitation/Skilled Care    Progress note:   Chart reviewed and Pt discussed with interdisciplinary team. Pt remains hospitalized with respiratory failure, sig hx of Covid 5/19-6/2.    Pt intubated 6/20-6/25, currently O2 @6L per NC.     Order placed for IRP ARTURO wilkins received call from [facility] and  [facility] are NOT in network for Pt's [employer] H[employer] insurance, closest in network IRP is [facility]. ([facility] customer service p- [phone].  CM spoke with customer service; [facility], [facility], [place], [facility] in [place] and [facility] for aged in [place] show that they are in network.      Increase fluids

## 2021-11-01 NOTE — PRE-OP CHECKLIST - ASSESSMENT, HISTORY & PHYSICAL COMPLETED AND ON MEDICAL RECORD
done PAST SURGICAL HISTORY:  History of appendectomy     History of coronary artery bypass graft     History of resection of liver

## 2022-08-19 NOTE — PROGRESS NOTE ADULT - I WAS PHYSICALLY PRESENT FOR THE KEY PORTIONS OF THE EVALUATION AND MANAGEMENT (E/M) SERVICE PROVIDED.  I AGREE WITH THE ABOVE HISTORY, PHYSICAL, AND PLAN WHICH I HAVE REVIEWED AND EDITED WHERE APPROPRIATE
Abdomen , soft, nontender, nondistended , no guarding or rigidity , no masses palpable , normal bowel sounds , Liver and Spleen , no hepatosplenomegaly
Statement Selected

## 2023-05-30 NOTE — ASSESSMENT
[FreeTextEntry1] : This is a 74 year male who presents for evaluation. Was in the ER and had palomo placed for urinary retention. was told that he had bacteria in the bladder -- was given cefdinir. has been told that he has a large prostate one year ago -- urologist in [place] (Johnie) treated with radiation. \par Currently on both finasteride, flomax, myrbetriq and oxybutyinin was recently completed. \par had a catheter placed in the past a few months ago as well. 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 I asked him to also stop myrbetriq last vist\par  \par US Prostate, 2.8, x4, x4cm.  about 25g\par \par cysto today showed bladder stone Helical Rim Advancement Flap Text: The defect edges were debeveled with a #15 blade scalpel.  Given the location of the defect and the proximity to free margins (helical rim) a double helical rim advancement flap was deemed most appropriate.  Using a sterile surgical marker, the appropriate advancement flaps were drawn incorporating the defect and placing the expected incisions between the helical rim and antihelix where possible.  The area thus outlined was incised through and through with a #15 scalpel blade.  With a skin hook and iris scissors, the flaps were gently and sharply undermined and freed up.

## 2023-08-25 NOTE — PROGRESS NOTE ADULT - ATTENDING COMMENTS
SW referred by VIDAL Easley for medication copay check for eliquis and flecainide.     Spoke to [facility] [address] in [place] pharm (phone: [phone]). Ellen Claudia Gagnon, [facility]  Medical Social Worker  [phone]   I saw and evaluated patient  by bedside, no dyspnea today,  patient remains very deconditioned, c/o occasional  right lower abdominal pain, no hypoxia at rest today, has persistent leaking palomo catheter.  All labs, radiology studies, VS was reviewed  I have reviewed the resident's note and agree with documented findings and  plan of care.  - Progressive stage 4 urothelial carcinoma with metastatic disease to lungs, liver with very poor prognosis,  continue palliative hormonal tx.    - Acute transaminitis due to metastatic liver disease- MRCP- no biliary system obstruction, continue supportive tx.   -Acute Hypoxic respiratory failure - clinically has improved post lasix tx,  nebs, Oxygen tx. prn, transitioned to po lasix tx.  - Complicated UTI - IV abx tx. -  switched to PO abx.   -Hypomangesemia, Hypokalemia - supplemented  -Malnutrition - continue supportive tx.     Patient's overall prognosis very poor    #Progress Note Handoff: continue abx tx. , d/c planning to SNF with transition to hospice at SNF  Family discussion: medical problems and plan was d/w patient and patient's daughter  by bedside Disposition: SNF once bed available .

## 2023-11-02 NOTE — H&P ADULT - NSTOBACCOSCREENHP_GEN_A_NCS
Quality 431: Preventive Care And Screening: Unhealthy Alcohol Use - Screening: Patient not identified as an unhealthy alcohol user when screened for unhealthy alcohol use using a systematic screening method Quality 110: Preventive Care And Screening: Influenza Immunization: Influenza Immunization Administered during Influenza season Patient declined to answer Quality 128: Preventive Care And Screening: Body Mass Index (Bmi) Screening And Follow-Up Plan: BMI is documented above normal parameters and a follow-up plan is documented Quality 130: Documentation Of Current Medications In The Medical Record: Current Medications Documented Quality 111:Pneumonia Vaccination Status For Older Adults: Patient received any pneumococcal conjugate or polysaccharide vaccine on or after their 60th birthday and before the end of the measurement period Quality 402: Tobacco Use And Help With Quitting Among Adolescents: Patient screened for tobacco and never smoked Detail Level: Detailed

## 2025-01-21 NOTE — HISTORY OF PRESENT ILLNESS
CBC, CMP, PT/INR, A1C; results are within anesthesia guidelines, no follow-up required. Labs automatically routed to ordering provider via Epic documentation.     [FreeTextEntry1] : This is a 74 year male who had turbt for small bladder base tumor.\par currently feeling better\par no fevers or chills\par urine is now draining clear. \par \par outside notes from Dr garcia show:\par Nov 2013-- prostate biopsy -- adenocarcinoma in multiple cores. -- up to 4 + 4 = 8  on the lateral mid and lateral base of the right\par Had negative Fish in Nov 2018, as well as negative cytology\par  \par will bring CT scan that was done by outside md for me to review \par

## 2025-03-06 NOTE — BRIEF OPERATIVE NOTE - NSICDXBRIEFOPLAUNCH_GEN_ALL_CORE
-- DO NOT REPLY / DO NOT REPLY ALL --  -- This inbox is not monitored. If this was sent to the wrong provider or department, reroute message to P [facility] Reroute pool. --  -- Message is from [facility] ([facility]) --  Reason for Appointment Message: Caller wants sooner Follow up appointment - all locations with clinician were offered    Reason for Visit: Patient is schedule to come on 04/17 but needs RS due to dr not being in,next available is May and patient would like to be seen sooner     Is the patient currently scheduled? Yes. Appointment Date:04/17    Preferred time to be seen: Afternoon     Caller Information       Contact Date/Time Type Contact Phone/Fax    03/05/2025 02:55 PM CST Phone (Incoming) Nikhil Franco [phone]            Alternative phone number: NA    Can a detailed message be left?  Yes - Voicemail   Patient has been advised the message will be addressed within 2-3 business days         Copied from CRM #[id number]. Topic:  Schedule Appointment -  Schedule Primary Care  >> Mar 5, 2025  2:55 PM Micaela GALVAN wrote:  Nikhil Franco called requesting to schedule a primary care visit with a Clinician. Checked insurance.  Looked for any active requests, recalls, service to orders, scheduling tickets prior to scheduling. The decision tree DT PC Was used for scheduling (an)     Non-Acute need. Not scheduled and followed instructions to message or transfer. Sent a message. Selected 'Wrap up CRM' and created new Telephone Encounter after clicking 'Convert to Clinical Call'. Selected reason for call 'Appointment'. Sent 'Appointment' template and routed as routine priority per Clinician KB page to appropriate clinician pool.  
Pt re established with JORGE Toussaint, has upcoming appt 04/14  
<--- Click to Launch ICDx for PreOp, PostOp and Procedure
<--- Click to Launch ICDx for PreOp, PostOp and Procedure